# Patient Record
Sex: MALE | Race: WHITE | Employment: OTHER | URBAN - METROPOLITAN AREA
[De-identification: names, ages, dates, MRNs, and addresses within clinical notes are randomized per-mention and may not be internally consistent; named-entity substitution may affect disease eponyms.]

---

## 2022-09-16 ENCOUNTER — APPOINTMENT (OUTPATIENT)
Dept: VASCULAR SURGERY | Age: 62
DRG: 492 | End: 2022-09-16
Attending: HOSPITALIST
Payer: MEDICARE

## 2022-09-16 ENCOUNTER — APPOINTMENT (OUTPATIENT)
Dept: CT IMAGING | Age: 62
DRG: 492 | End: 2022-09-16
Attending: EMERGENCY MEDICINE
Payer: MEDICARE

## 2022-09-16 ENCOUNTER — APPOINTMENT (OUTPATIENT)
Dept: GENERAL RADIOLOGY | Age: 62
DRG: 492 | End: 2022-09-16
Attending: PHYSICIAN ASSISTANT
Payer: MEDICARE

## 2022-09-16 ENCOUNTER — APPOINTMENT (OUTPATIENT)
Dept: GENERAL RADIOLOGY | Age: 62
DRG: 492 | End: 2022-09-16
Attending: HOSPITALIST
Payer: MEDICARE

## 2022-09-16 ENCOUNTER — HOSPITAL ENCOUNTER (INPATIENT)
Age: 62
LOS: 16 days | Discharge: NURSING, REHAB OR LONG-TERM CARE | DRG: 492 | End: 2022-10-03
Attending: EMERGENCY MEDICINE | Admitting: INTERNAL MEDICINE
Payer: MEDICARE

## 2022-09-16 ENCOUNTER — APPOINTMENT (OUTPATIENT)
Dept: GENERAL RADIOLOGY | Age: 62
DRG: 492 | End: 2022-09-16
Attending: EMERGENCY MEDICINE
Payer: MEDICARE
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
## 2022-09-16 DIAGNOSIS — S82.842A CLOSED BIMALLEOLAR FRACTURE OF LEFT ANKLE, INITIAL ENCOUNTER: ICD-10-CM

## 2022-09-16 DIAGNOSIS — I48.0 PAF (PAROXYSMAL ATRIAL FIBRILLATION) (HCC): ICD-10-CM

## 2022-09-16 DIAGNOSIS — Z99.2 ESRD ON HEMODIALYSIS (HCC): ICD-10-CM

## 2022-09-16 DIAGNOSIS — R55 NEAR SYNCOPE: Primary | ICD-10-CM

## 2022-09-16 DIAGNOSIS — S82.892A CLOSED FRACTURE OF LEFT ANKLE, INITIAL ENCOUNTER: ICD-10-CM

## 2022-09-16 DIAGNOSIS — S93.05XA ANKLE DISLOCATION, LEFT, INITIAL ENCOUNTER: ICD-10-CM

## 2022-09-16 DIAGNOSIS — N18.6 ESRD ON HEMODIALYSIS (HCC): ICD-10-CM

## 2022-09-16 DIAGNOSIS — S09.8XXA BLUNT HEAD TRAUMA, INITIAL ENCOUNTER: ICD-10-CM

## 2022-09-16 LAB
ALBUMIN SERPL-MCNC: 3.4 G/DL (ref 3.5–5)
ALBUMIN/GLOB SERPL: 0.8 {RATIO} (ref 1.1–2.2)
ALP SERPL-CCNC: 122 U/L (ref 45–117)
ALT SERPL-CCNC: 17 U/L (ref 12–78)
AMORPH CRY URNS QL MICRO: ABNORMAL
ANION GAP SERPL CALC-SCNC: 8 MMOL/L (ref 5–15)
APPEARANCE UR: CLEAR
AST SERPL-CCNC: 14 U/L (ref 15–37)
ATRIAL RATE: 73 BPM
BACTERIA URNS QL MICRO: NEGATIVE /HPF
BASOPHILS # BLD: 0 K/UL (ref 0–0.1)
BASOPHILS NFR BLD: 0 % (ref 0–1)
BILIRUB SERPL-MCNC: 0.3 MG/DL (ref 0.2–1)
BILIRUB UR QL: NEGATIVE
BUN SERPL-MCNC: 60 MG/DL (ref 6–20)
BUN/CREAT SERPL: 11 (ref 12–20)
CALCIUM SERPL-MCNC: 9.9 MG/DL (ref 8.5–10.1)
CALCULATED P AXIS, ECG09: 17 DEGREES
CALCULATED R AXIS, ECG10: -82 DEGREES
CALCULATED T AXIS, ECG11: 81 DEGREES
CHLORIDE SERPL-SCNC: 97 MMOL/L (ref 97–108)
CO2 SERPL-SCNC: 27 MMOL/L (ref 21–32)
COLOR UR: ABNORMAL
COMMENT, HOLDF: NORMAL
CREAT SERPL-MCNC: 5.29 MG/DL (ref 0.7–1.3)
DIAGNOSIS, 93000: NORMAL
DIFFERENTIAL METHOD BLD: ABNORMAL
EOSINOPHIL # BLD: 0 K/UL (ref 0–0.4)
EOSINOPHIL NFR BLD: 0 % (ref 0–7)
EPITH CASTS URNS QL MICRO: ABNORMAL /LPF
ERYTHROCYTE [DISTWIDTH] IN BLOOD BY AUTOMATED COUNT: 12.9 % (ref 11.5–14.5)
GLOBULIN SER CALC-MCNC: 4.2 G/DL (ref 2–4)
GLUCOSE SERPL-MCNC: 86 MG/DL (ref 65–100)
GLUCOSE UR STRIP.AUTO-MCNC: 250 MG/DL
HCT VFR BLD AUTO: 36.5 % (ref 36.6–50.3)
HGB BLD-MCNC: 12.9 G/DL (ref 12.1–17)
HGB UR QL STRIP: ABNORMAL
IMM GRANULOCYTES # BLD AUTO: 0.1 K/UL (ref 0–0.04)
IMM GRANULOCYTES NFR BLD AUTO: 0 % (ref 0–0.5)
KETONES UR QL STRIP.AUTO: NEGATIVE MG/DL
LEUKOCYTE ESTERASE UR QL STRIP.AUTO: NEGATIVE
LYMPHOCYTES # BLD: 1.7 K/UL (ref 0.8–3.5)
LYMPHOCYTES NFR BLD: 15 % (ref 12–49)
MCH RBC QN AUTO: 31.2 PG (ref 26–34)
MCHC RBC AUTO-ENTMCNC: 35.3 G/DL (ref 30–36.5)
MCV RBC AUTO: 88.4 FL (ref 80–99)
MONOCYTES # BLD: 0.7 K/UL (ref 0–1)
MONOCYTES NFR BLD: 6 % (ref 5–13)
NEUTS SEG # BLD: 8.8 K/UL (ref 1.8–8)
NEUTS SEG NFR BLD: 79 % (ref 32–75)
NITRITE UR QL STRIP.AUTO: NEGATIVE
NRBC # BLD: 0 K/UL (ref 0–0.01)
NRBC BLD-RTO: 0 PER 100 WBC
P-R INTERVAL, ECG05: 218 MS
PH UR STRIP: 5.5 [PH] (ref 5–8)
PLATELET # BLD AUTO: 181 K/UL (ref 150–400)
PMV BLD AUTO: 10.2 FL (ref 8.9–12.9)
POTASSIUM SERPL-SCNC: 4.1 MMOL/L (ref 3.5–5.1)
PROT SERPL-MCNC: 7.6 G/DL (ref 6.4–8.2)
PROT UR STRIP-MCNC: >300 MG/DL
Q-T INTERVAL, ECG07: 446 MS
QRS DURATION, ECG06: 162 MS
QTC CALCULATION (BEZET), ECG08: 491 MS
RBC # BLD AUTO: 4.13 M/UL (ref 4.1–5.7)
RBC #/AREA URNS HPF: ABNORMAL /HPF (ref 0–5)
SAMPLES BEING HELD,HOLD: NORMAL
SODIUM SERPL-SCNC: 132 MMOL/L (ref 136–145)
SP GR UR REFRACTOMETRY: 1.02 (ref 1–1.03)
TROPONIN-HIGH SENSITIVITY: 54 NG/L (ref 0–76)
UR CULT HOLD, URHOLD: NORMAL
UROBILINOGEN UR QL STRIP.AUTO: 0.2 EU/DL (ref 0.2–1)
VENTRICULAR RATE, ECG03: 73 BPM
WBC # BLD AUTO: 11.3 K/UL (ref 4.1–11.1)
WBC URNS QL MICRO: ABNORMAL /HPF (ref 0–4)

## 2022-09-16 PROCEDURE — 87340 HEPATITIS B SURFACE AG IA: CPT

## 2022-09-16 PROCEDURE — 90935 HEMODIALYSIS ONE EVALUATION: CPT

## 2022-09-16 PROCEDURE — 74011250636 HC RX REV CODE- 250/636: Performed by: HOSPITALIST

## 2022-09-16 PROCEDURE — 5A1D70Z PERFORMANCE OF URINARY FILTRATION, INTERMITTENT, LESS THAN 6 HOURS PER DAY: ICD-10-PCS | Performed by: INTERNAL MEDICINE

## 2022-09-16 PROCEDURE — 85025 COMPLETE CBC W/AUTO DIFF WBC: CPT

## 2022-09-16 PROCEDURE — G0378 HOSPITAL OBSERVATION PER HR: HCPCS

## 2022-09-16 PROCEDURE — 96376 TX/PRO/DX INJ SAME DRUG ADON: CPT

## 2022-09-16 PROCEDURE — 96375 TX/PRO/DX INJ NEW DRUG ADDON: CPT

## 2022-09-16 PROCEDURE — 81001 URINALYSIS AUTO W/SCOPE: CPT

## 2022-09-16 PROCEDURE — 51798 US URINE CAPACITY MEASURE: CPT

## 2022-09-16 PROCEDURE — 73600 X-RAY EXAM OF ANKLE: CPT

## 2022-09-16 PROCEDURE — 0QSKXZZ REPOSITION LEFT FIBULA, EXTERNAL APPROACH: ICD-10-PCS | Performed by: PHYSICIAN ASSISTANT

## 2022-09-16 PROCEDURE — 93880 EXTRACRANIAL BILAT STUDY: CPT

## 2022-09-16 PROCEDURE — 74011250636 HC RX REV CODE- 250/636: Performed by: EMERGENCY MEDICINE

## 2022-09-16 PROCEDURE — 84484 ASSAY OF TROPONIN QUANT: CPT

## 2022-09-16 PROCEDURE — 80053 COMPREHEN METABOLIC PANEL: CPT

## 2022-09-16 PROCEDURE — 96374 THER/PROPH/DIAG INJ IV PUSH: CPT

## 2022-09-16 PROCEDURE — 36415 COLL VENOUS BLD VENIPUNCTURE: CPT

## 2022-09-16 PROCEDURE — 74011250637 HC RX REV CODE- 250/637: Performed by: HOSPITALIST

## 2022-09-16 PROCEDURE — 70450 CT HEAD/BRAIN W/O DYE: CPT

## 2022-09-16 PROCEDURE — 86706 HEP B SURFACE ANTIBODY: CPT

## 2022-09-16 PROCEDURE — 74011250637 HC RX REV CODE- 250/637: Performed by: NURSE PRACTITIONER

## 2022-09-16 PROCEDURE — 71045 X-RAY EXAM CHEST 1 VIEW: CPT

## 2022-09-16 PROCEDURE — 93005 ELECTROCARDIOGRAM TRACING: CPT

## 2022-09-16 PROCEDURE — 75810000301 HC ER LEVEL 1 CLOSED TREATMNT FRACTURE/DISLOCATION

## 2022-09-16 PROCEDURE — 74011000250 HC RX REV CODE- 250: Performed by: EMERGENCY MEDICINE

## 2022-09-16 PROCEDURE — 99285 EMERGENCY DEPT VISIT HI MDM: CPT

## 2022-09-16 RX ORDER — SODIUM CHLORIDE 0.9 % (FLUSH) 0.9 %
5-40 SYRINGE (ML) INJECTION AS NEEDED
Status: DISCONTINUED | OUTPATIENT
Start: 2022-09-16 | End: 2022-10-03 | Stop reason: HOSPADM

## 2022-09-16 RX ORDER — ACETAMINOPHEN 650 MG/1
650 SUPPOSITORY RECTAL
Status: DISCONTINUED | OUTPATIENT
Start: 2022-09-16 | End: 2022-09-16

## 2022-09-16 RX ORDER — ONDANSETRON 2 MG/ML
4 INJECTION INTRAMUSCULAR; INTRAVENOUS
Status: DISCONTINUED | OUTPATIENT
Start: 2022-09-16 | End: 2022-10-03 | Stop reason: HOSPADM

## 2022-09-16 RX ORDER — LIDOCAINE HYDROCHLORIDE 10 MG/ML
5 INJECTION INFILTRATION; PERINEURAL ONCE
Status: COMPLETED | OUTPATIENT
Start: 2022-09-16 | End: 2022-09-16

## 2022-09-16 RX ORDER — MORPHINE SULFATE 2 MG/ML
2 INJECTION, SOLUTION INTRAMUSCULAR; INTRAVENOUS
Status: COMPLETED | OUTPATIENT
Start: 2022-09-16 | End: 2022-09-16

## 2022-09-16 RX ORDER — ALBUMIN HUMAN 250 G/1000ML
25 SOLUTION INTRAVENOUS
Status: DISCONTINUED | OUTPATIENT
Start: 2022-09-16 | End: 2022-10-03 | Stop reason: HOSPADM

## 2022-09-16 RX ORDER — ACETAMINOPHEN 650 MG/1
650 SUPPOSITORY RECTAL
Status: DISCONTINUED | OUTPATIENT
Start: 2022-09-16 | End: 2022-10-03 | Stop reason: HOSPADM

## 2022-09-16 RX ORDER — HEPARIN SODIUM 5000 [USP'U]/ML
5000 INJECTION, SOLUTION INTRAVENOUS; SUBCUTANEOUS EVERY 8 HOURS
Status: DISCONTINUED | OUTPATIENT
Start: 2022-09-17 | End: 2022-09-21

## 2022-09-16 RX ORDER — ENOXAPARIN SODIUM 100 MG/ML
40 INJECTION SUBCUTANEOUS DAILY
Status: DISCONTINUED | OUTPATIENT
Start: 2022-09-17 | End: 2022-09-16 | Stop reason: ALTCHOICE

## 2022-09-16 RX ORDER — ONDANSETRON 4 MG/1
4 TABLET, ORALLY DISINTEGRATING ORAL
Status: DISCONTINUED | OUTPATIENT
Start: 2022-09-16 | End: 2022-10-03 | Stop reason: HOSPADM

## 2022-09-16 RX ORDER — ACETAMINOPHEN 325 MG/1
650 TABLET ORAL
Status: DISCONTINUED | OUTPATIENT
Start: 2022-09-16 | End: 2022-09-16

## 2022-09-16 RX ORDER — POLYETHYLENE GLYCOL 3350 17 G/17G
17 POWDER, FOR SOLUTION ORAL DAILY PRN
Status: DISCONTINUED | OUTPATIENT
Start: 2022-09-16 | End: 2022-09-24

## 2022-09-16 RX ORDER — OXYCODONE HYDROCHLORIDE 5 MG/1
5 TABLET ORAL
Status: DISCONTINUED | OUTPATIENT
Start: 2022-09-16 | End: 2022-09-17

## 2022-09-16 RX ORDER — ACETAMINOPHEN 325 MG/1
650 TABLET ORAL
Status: DISCONTINUED | OUTPATIENT
Start: 2022-09-16 | End: 2022-10-03 | Stop reason: HOSPADM

## 2022-09-16 RX ORDER — FENTANYL CITRATE 50 UG/ML
25 INJECTION, SOLUTION INTRAMUSCULAR; INTRAVENOUS
Status: DISCONTINUED | OUTPATIENT
Start: 2022-09-16 | End: 2022-09-21 | Stop reason: SDUPTHER

## 2022-09-16 RX ORDER — MORPHINE SULFATE 2 MG/ML
2 INJECTION, SOLUTION INTRAMUSCULAR; INTRAVENOUS
Status: DISCONTINUED | OUTPATIENT
Start: 2022-09-16 | End: 2022-09-16

## 2022-09-16 RX ORDER — SODIUM CHLORIDE 0.9 % (FLUSH) 0.9 %
5-40 SYRINGE (ML) INJECTION EVERY 8 HOURS
Status: DISCONTINUED | OUTPATIENT
Start: 2022-09-16 | End: 2022-10-03 | Stop reason: HOSPADM

## 2022-09-16 RX ORDER — HYDRALAZINE HYDROCHLORIDE 20 MG/ML
20 INJECTION INTRAMUSCULAR; INTRAVENOUS
Status: DISCONTINUED | OUTPATIENT
Start: 2022-09-16 | End: 2022-09-17

## 2022-09-16 RX ORDER — MORPHINE SULFATE 4 MG/ML
4 INJECTION INTRAVENOUS ONCE
Status: COMPLETED | OUTPATIENT
Start: 2022-09-16 | End: 2022-09-16

## 2022-09-16 RX ADMIN — WATER 2 G: 1 INJECTION INTRAMUSCULAR; INTRAVENOUS; SUBCUTANEOUS at 10:04

## 2022-09-16 RX ADMIN — ACETAMINOPHEN 650 MG: 325 TABLET, FILM COATED ORAL at 20:45

## 2022-09-16 RX ADMIN — MORPHINE SULFATE 4 MG: 4 INJECTION INTRAVENOUS at 13:30

## 2022-09-16 RX ADMIN — HYDRALAZINE HYDROCHLORIDE 20 MG: 20 INJECTION, SOLUTION INTRAMUSCULAR; INTRAVENOUS at 17:52

## 2022-09-16 RX ADMIN — MORPHINE SULFATE 2 MG: 2 INJECTION, SOLUTION INTRAMUSCULAR; INTRAVENOUS at 18:59

## 2022-09-16 RX ADMIN — MORPHINE SULFATE 2 MG: 2 INJECTION, SOLUTION INTRAMUSCULAR; INTRAVENOUS at 15:41

## 2022-09-16 RX ADMIN — OXYCODONE 5 MG: 5 TABLET ORAL at 16:33

## 2022-09-16 RX ADMIN — MORPHINE SULFATE 2 MG: 2 INJECTION, SOLUTION INTRAMUSCULAR; INTRAVENOUS at 10:03

## 2022-09-16 RX ADMIN — ACETAMINOPHEN 650 MG: 325 TABLET, FILM COATED ORAL at 15:41

## 2022-09-16 RX ADMIN — MORPHINE SULFATE 2 MG: 2 INJECTION, SOLUTION INTRAMUSCULAR; INTRAVENOUS at 10:45

## 2022-09-16 RX ADMIN — OXYCODONE 5 MG: 5 TABLET ORAL at 20:45

## 2022-09-16 RX ADMIN — LIDOCAINE HYDROCHLORIDE 5 ML: 10 INJECTION, SOLUTION INFILTRATION; PERINEURAL at 10:44

## 2022-09-16 NOTE — ED NOTES
TRANSFER - OUT REPORT:    Verbal report given to Reshma(name) on Jasson Moreno  being transferred to [facility](unit) for routine progression of care       Report consisted of patients Situation, Background, Assessment and   Recommendations(SBAR). Information from the following report(s) SBAR, Kardex, ED Summary and MAR was reviewed with the receiving nurse. Lines:   Peripheral IV 09/16/22 Right Arm (Active)   Site Assessment Clean, dry, & intact 09/16/22 0956        Opportunity for questions and clarification was provided.       Patient transported with:

## 2022-09-16 NOTE — PROCEDURES
Fracture Reduction Procedural Note      Preprocedural Diagnosis: Left ankle dislocation, left distal fibula fracture  Postprocedural Diagnosis: Left ankle dislocation, left distal fibula fracture    Provider: ANJANA Shah     Assistant: none    Anesthesia: Ankle joint block    Allergy:   Allergies   Allergen Reactions    Adhesive Rash     Paper tape ok    Bactrim [Sulfamethoprim] Rash       Procedure Details: The risks,benefits and alternatives of a fracture reduction were explained and consent was obtained for the procedure. Patient at high risk for procedural sedation. I elected to perform an ankle joint block at the bedside. The anterior medial aspect of the left ankle was cleansed with betadine at the site of proposed joint entry. A small skin tear over the medial ankle was also cleansed with betadine  The ankle joint was then entered with a 21 gauge 1.5\" needle and a hemarthrosis was encountered. The joint was then injected with 10mL of 1% lidocaine. After allowing time and confirming efficacy of the block, closed closed reduction was performed. The mechanism of fracture was reproduced and axial traction was applied with nursing providing countertraction. The ankle was further manipulated until reduction was achieved. A xeroform dressing was applied to the medial ankle skin tear. Cast padding was then applied to the left leg from the knee to the toes while maintaining reduction. Posterior and stirrup splints were applied of and secured in place with ACE wraps. Pt. Tolerated procedure well w/o complications. Patient educated on neurovascular compromise and compartment syndrome. Pt. Neurovascularly intact with sensation intact and capillary refill <2secs p procedure in left foot. Complications:  None; patient tolerated the procedure well.         Signed By: Justyna Brandt, [address] [employer]

## 2022-09-16 NOTE — PROGRESS NOTES
Problem: Falls - Risk of  Goal: *Absence of Falls  Description: Document Yina Rabago Fall Risk and appropriate interventions in the flowsheet.   Outcome: Progressing Towards Goal  Note: Fall Risk Interventions:            Medication Interventions: Bed/chair exit alarm, Patient to call before getting OOB    Elimination Interventions: Bed/chair exit alarm    History of Falls Interventions: Room close to nurse's station, Investigate reason for fall, Bed/chair exit alarm         Problem: Pain  Goal: *Control of Pain  Outcome: Progressing Towards Goal  Goal: *PALLIATIVE CARE:  Alleviation of Pain  Outcome: Progressing Towards Goal

## 2022-09-16 NOTE — ED TRIAGE NOTES
Triage: Pt arrives via EMS with CC of markedly deformed ankle and opening to the skin following a  fall today. He reports his legs just gave out from underneath him. He hit his head on the floor. He reports he gets heparin for dialysis but otherwise not anticoagulated. The skin is open at the site of deformity. He is due for dialysis today.

## 2022-09-16 NOTE — H&P
History & Physical    Primary Care Provider: Danielle Musa MD  Source of Information: Patient chart    Please note that this dictation was completed with [employer], the computer voice recognition software. Quite often unanticipated grammatical, syntax, homophones, and other interpretive errors are inadvertently transcribed by the computer software. Please disregard these errors. Please excuse any errors that have escaped final proofreading. Chief complaint fall    History of Presenting Illness:   Rosalia Robert is a 58 y.o. male who presents with fall and ankle fracture. Patient has past medical history of end-stage renal disease on hemodialysis. He said he struck his head he complains of left ankle injury. There was an injury over the medial malleolus . Right now patient complains of severe ankle pain was sedated for left ankle stabilization. Denies any chest pain, shortness of breath, fevers or recent illnesses. No headache. He does receive heparin for dialysis. He was due for dialysis today and typically receives it Monday Wednesday and Friday. Last dialysis was Wednesday. He sees Dr. Clau Arvizu. Nephrology consulted for resuming hemodialysis    The patient denies any fever, chills, chest pain, cough, congestion, recent illness, palpitations, or dysuria. Review of Systems:  Pertinent items are noted in the History of Present Illness. Past Medical History:   Diagnosis Date    Anemia     Chronic kidney disease     Diabetes ([address]     Dorsalgia     ESRD on hemodialysis ([facility] [address]     Gastro-esophageal reflux     Heart failure (HCC)     Hypertension     Obesity     Polyneuropathy     Sciatica       No past surgical history on file. Prior to Admission medications    Not on File     Allergies   Allergen Reactions    Adhesive Rash     Paper tape ok    Bactrim [Sulfamethoprim] Rash      No family history on file.      SOCIAL HISTORY:  Patient resides:  Independently x   Assisted Living    SNF    With family care       Smoking history:   None x   Former    Chronic      Alcohol history:   None x   Social    Chronic      Ambulates:   Independently x   w/cane    w/walker    w/wc    CODE STATUS:  DNR    Full x   Other      Objective:     Physical Exam:     Visit Vitals  BP (!) 162/66   Pulse 62   Temp 98.4 °F (36.9 °C)   Resp 12   SpO2 92%      O2 Device: None (Room air)    General:  Alert, cooperative, no distress, appears stated age. Head:  Normocephalic, without obvious abnormality, atraumatic. Eyes:  Conjunctivae/corneas clear. PERRL, EOMs intact. Nose: Nares normal. Septum midline. Mucosa normal. No drainage or sinus tenderness. Throat: Lips, mucosa, and tongue normal. Teeth and gums normal.   Neck: Supple, symmetrical, trachea midline, no carotid bruit and no JVD. Lungs:   Clear to auscultation bilaterally. Heart:  Regular rate and rhythm, S1, S2 normal, no murmur, click, rub or gallop. Abdomen:   Soft, non-tender. Bowel sounds normal. No masses,  No organomegaly. Extremities: Left ankle is on the pillow wrapped in bandage   Pulses: 2+ and symmetric all extremities. Skin: Skin color, texture, turgor normal. No rashes or lesions   Neurologic: Sedated possibly sedation given during procedure         EKG:  normal EKG, normal sinus rhythm. Data Review:     Recent Days:  Recent Labs     09/16/22  0952   WBC 11.3*   HGB 12.9   HCT 36.5*        Recent Labs     09/16/22  0952   *   K 4.1   CL 97   CO2 27   GLU 86   BUN 60*   CREA 5.29*   CA 9.9   ALB 3.4*   TBILI 0.3   ALT 17     No results for input(s): PH, PCO2, PO2, HCO3, FIO2 in the last 72 hours.     24 Hour Results:  Recent Results (from the past 24 hour(s))   CBC WITH AUTOMATED DIFF    Collection Time: 09/16/22  9:52 AM   Result Value Ref Range    WBC 11.3 (H) 4.1 - 11.1 K/uL    RBC 4.13 4.10 - 5.70 M/uL    HGB 12.9 12.1 - 17.0 g/dL    HCT 36.5 (L) 36.6 - 50.3 %    MCV 88.4 80.0 - 99.0 FL    MCH 31.2 26.0 - 34.0 PG    MCHC 35.3 30.0 - 36.5 g/dL    RDW 12.9 11.5 - 14.5 %    PLATELET 286 090 - 340 K/uL    MPV 10.2 8.9 - 12.9 FL    NRBC 0.0 0  WBC    ABSOLUTE NRBC 0.00 0.00 - 0.01 K/uL    NEUTROPHILS 79 (H) 32 - 75 %    LYMPHOCYTES 15 12 - 49 %    MONOCYTES 6 5 - 13 %    EOSINOPHILS 0 0 - 7 %    BASOPHILS 0 0 - 1 %    IMMATURE GRANULOCYTES 0 0.0 - 0.5 %    ABS. NEUTROPHILS 8.8 (H) 1.8 - 8.0 K/UL    ABS. LYMPHOCYTES 1.7 0.8 - 3.5 K/UL    ABS. MONOCYTES 0.7 0.0 - 1.0 K/UL    ABS. EOSINOPHILS 0.0 0.0 - 0.4 K/UL    ABS. BASOPHILS 0.0 0.0 - 0.1 K/UL    ABS. IMM. GRANS. 0.1 (H) 0.00 - 0.04 K/UL    DF AUTOMATED     METABOLIC PANEL, COMPREHENSIVE    Collection Time: 09/16/22  9:52 AM   Result Value Ref Range    Sodium 132 (L) 136 - 145 mmol/L    Potassium 4.1 3.5 - 5.1 mmol/L    Chloride 97 97 - 108 mmol/L    CO2 27 21 - 32 mmol/L    Anion gap 8 5 - 15 mmol/L    Glucose 86 65 - 100 mg/dL    BUN 60 (H) 6 - 20 MG/DL    Creatinine 5.29 (H) 0.70 - 1.30 MG/DL    BUN/Creatinine ratio 11 (L) 12 - 20      GFR est AA 13 (L) >60 ml/min/1.73m2    GFR est non-AA 11 (L) >60 ml/min/1.73m2    Calcium 9.9 8.5 - 10.1 MG/DL    Bilirubin, total 0.3 0.2 - 1.0 MG/DL    ALT (SGPT) 17 12 - 78 U/L    AST (SGOT) 14 (L) 15 - 37 U/L    Alk. phosphatase 122 (H) 45 - 117 U/L    Protein, total 7.6 6.4 - 8.2 g/dL    Albumin 3.4 (L) 3.5 - 5.0 g/dL    Globulin 4.2 (H) 2.0 - 4.0 g/dL    A-G Ratio 0.8 (L) 1.1 - 2.2     TROPONIN-HIGH SENSITIVITY    Collection Time: 09/16/22  9:52 AM   Result Value Ref Range    Troponin-High Sensitivity 54 0 - 76 ng/L   SAMPLES BEING HELD    Collection Time: 09/16/22  9:53 AM   Result Value Ref Range    SAMPLES BEING HELD 1blue 1red     COMMENT        Add-on orders for these samples will be processed based on acceptable specimen integrity and analyte stability, which may vary by analyte. Imaging:     XR ANKLE LT AP/LAT    Result Date: 9/16/2022  Improved alignment.     XR ANKLE LT AP/LAT    Result Date: 9/16/2022  Fracture dislocation as described above. CT HEAD WO CONT    Result Date: 9/16/2022  No acute abnormality. XR CHEST PORT    Result Date: 9/16/2022  No acute process. Admit to inpatient status      Patient was explained about the risk of admission including and not a complete list including risk of falls,fractures,blood clots,allergic reactions,infections. Patient/family also understands and agrees to the treatment plan including medications and side effect profiles and also understand the risk with radiation while undergoing imaging studies. The patient and the family/friends (after permission given by the patient to discuss) understand this and agree with the admission plan.         Assessment:     Active Problems:    Syncope (9/16/2022)           Plan:     Ankle fracture  --Management per Ortho pelvic surgery  --Pain control  --Elucidated plan for ORIF later    End-stage renal disease on hemodialysis Monday Wednesday Friday  --Insulted nephrology    Near syncope leading to fall and blunt head trauma  --CT head no acute abnormality  --Get echocardiogram carotid Doppler    Full code ambulates at baseline currently having ankle fracture possibly will need support  DVT prophylaxis Lovenox  Dispo plan to be determined       Signed By: Steve Young MD     September 16, 2022

## 2022-09-16 NOTE — ED PROVIDER NOTES
HPI     Please note that this dictation was completed with [employer], the computer voice recognition software. Quite often unanticipated grammatical, syntax, homophones, and other interpretive errors are inadvertently transcribed by the computer software. Please disregard these errors. Please excuse any errors that have escaped final proofreading. 49-year-old male with a history of multiple medical problems including cardiac history, end-stage renal disease on hemodialysis who felt lightheaded and fell. He struck his head. He complains of left ankle injury. There is an abrasion and wound over his left medial malleolus. Complains of severe left ankle pain worse with movement. Denies any loss of consciousness. Denies any chest pain, shortness of breath, fevers or recent illnesses. No headache. He does receive heparin for dialysis. He was due for dialysis today and typically receives it Monday Wednesday and Friday. Last dialysis was Wednesday. He sees Dr. Phu Maldonado. Social history:  nonsmoker. No drug use. No past medical history on file. No past surgical history on file. No family history on file.     Social History     Socioeconomic History    Marital status: Not on file     Spouse name: Not on file    Number of children: Not on file    Years of education: Not on file    Highest education level: Not on file   Occupational History    Not on file   Tobacco Use    Smoking status: Not on file    Smokeless tobacco: Not on file   Substance and Sexual Activity    Alcohol use: Not on file    Drug use: Not on file    Sexual activity: Not on file   Other Topics Concern    Not on file   Social History Narrative    Not on file     Social Determinants of Health     Financial Resource Strain: Not on file   Food Insecurity: Not on file   Transportation Needs: Not on file   Physical Activity: Not on file   Stress: Not on file   Social Connections: Not on file   Intimate Partner Violence: Not on file Housing Stability: Not on file         ALLERGIES: Adhesive and Bactrim [sulfamethoprim]    Review of Systems   Constitutional:  Negative for chills and fever. HENT:  Negative for congestion. Respiratory:  Negative for chest tightness and shortness of breath. Cardiovascular:  Negative for chest pain. Gastrointestinal:  Negative for nausea and vomiting. Musculoskeletal:  Positive for arthralgias, gait problem and joint swelling. Skin:  Positive for wound. Neurological:  Positive for light-headedness. All other systems reviewed and are negative. Vitals:    09/16/22 0937   BP: (!) 207/71   Pulse: 71   Resp: 18   Temp: 98.4 °F (36.9 °C)   SpO2: 97%            Physical Exam  Vitals and nursing note reviewed. Constitutional:       Appearance: Normal appearance. He is well-developed. He is obese. HENT:      Head: Normocephalic and atraumatic. Nose: No congestion or rhinorrhea. Mouth/Throat:      Mouth: Mucous membranes are moist.      Pharynx: No oropharyngeal exudate or posterior oropharyngeal erythema. Eyes:      General: No scleral icterus. Right eye: No discharge. Left eye: No discharge. Conjunctiva/sclera: Conjunctivae normal.   Cardiovascular:      Rate and Rhythm: Normal rate and regular rhythm. Heart sounds: Normal heart sounds. No murmur heard. No friction rub. No gallop. Pulmonary:      Effort: Pulmonary effort is normal. No respiratory distress. Breath sounds: Normal breath sounds. No wheezing or rales. Abdominal:      General: There is no distension. Palpations: Abdomen is soft. Tenderness: There is no abdominal tenderness. There is no guarding. Musculoskeletal:      Cervical back: Normal range of motion and neck supple. No rigidity. Comments: Left ankle with distal intact sensation, 2+ DP pulse. Limited range of motion of the left ankle due to pain.   Positive edema and tenderness to his medial malleolus with overlying abrasion to his medial malleolus. Lymphadenopathy:      Cervical: No cervical adenopathy. Skin:     General: Skin is warm and dry. Coloration: Skin is not jaundiced or pale. Findings: No rash. Neurological:      Mental Status: He is alert and oriented to person, place, and time. Sensory: No sensory deficit. Motor: No weakness. MDM       51-year-old male here with left ankle injury and near syncope. Significant cardiac history and end-stage renal disease. Differential diagnosis includes MI, dysrhythmia, electrolyte abnormality, open fracture and others. Will cover with abx pending ortho consult. Check left ankle x-ray, chest x-ray, labs, EKG. Procedures      12:02 PM  Fracture reduced by orthopedics. Splinted by orthopedics. Will admit to hospitalist for the near syncope. Head CT negative. Nephrology consulted and saw patient. They are arranging for dialysis. Patient is being admitted to the hospital.  The results of their tests and reasons for their admission have been discussed with them and/or available family. They convey agreement and understanding for the need to be admitted and for their admission diagnosis. Consultation will be made now with the inpatient physician for hospitalization. Perfect [facility] Consult for Admission  12:03 PM    ED Room Number: VK16/90  Patient Name and age:  Madeleine Zapata 58 y.o.  male  Working Diagnosis:   1. Near syncope    2. ESRD on hemodialysis (HCC)    3. Blunt head trauma, initial encounter    4. Fall from ground level    5. Ankle dislocation, left, initial encounter    6. Closed fracture of left ankle, initial encounter        COVID-19 Suspicion:  no  Sepsis present:  no  Reassessment needed: no  Code Status:  Full Code  Readmission: no  Isolation Requirements:  no  Recommended Level of Care:  telemetry  Department:[facility] Adult ED - 21   Other:  orthopedics reduced the ankle.   Nephrology consulted and will dialyze today. ED EKG interpretation:  Rhythm: SR with first degree AVB and RBBB and pvc; and regular . Rate (approx.): 73; Axis: left axis deviation; P wave: prolonged: QRS interval: prolonged; ST/T wave: normal;  This EKG was interpreted by Stu Perez MD,ED Provider. Recent Results (from the past 24 hour(s))   CBC WITH AUTOMATED DIFF    Collection Time: 09/16/22  9:52 AM   Result Value Ref Range    WBC 11.3 (H) 4.1 - 11.1 K/uL    RBC 4.13 4.10 - 5.70 M/uL    HGB 12.9 12.1 - 17.0 g/dL    HCT 36.5 (L) 36.6 - 50.3 %    MCV 88.4 80.0 - 99.0 FL    MCH 31.2 26.0 - 34.0 PG    MCHC 35.3 30.0 - 36.5 g/dL    RDW 12.9 11.5 - 14.5 %    PLATELET 131 845 - 153 K/uL    MPV 10.2 8.9 - 12.9 FL    NRBC 0.0 0  WBC    ABSOLUTE NRBC 0.00 0.00 - 0.01 K/uL    NEUTROPHILS 79 (H) 32 - 75 %    LYMPHOCYTES 15 12 - 49 %    MONOCYTES 6 5 - 13 %    EOSINOPHILS 0 0 - 7 %    BASOPHILS 0 0 - 1 %    IMMATURE GRANULOCYTES 0 0.0 - 0.5 %    ABS. NEUTROPHILS 8.8 (H) 1.8 - 8.0 K/UL    ABS. LYMPHOCYTES 1.7 0.8 - 3.5 K/UL    ABS. MONOCYTES 0.7 0.0 - 1.0 K/UL    ABS. EOSINOPHILS 0.0 0.0 - 0.4 K/UL    ABS. BASOPHILS 0.0 0.0 - 0.1 K/UL    ABS. IMM. GRANS. 0.1 (H) 0.00 - 0.04 K/UL    DF AUTOMATED     METABOLIC PANEL, COMPREHENSIVE    Collection Time: 09/16/22  9:52 AM   Result Value Ref Range    Sodium 132 (L) 136 - 145 mmol/L    Potassium 4.1 3.5 - 5.1 mmol/L    Chloride 97 97 - 108 mmol/L    CO2 27 21 - 32 mmol/L    Anion gap 8 5 - 15 mmol/L    Glucose 86 65 - 100 mg/dL    BUN 60 (H) 6 - 20 MG/DL    Creatinine 5.29 (H) 0.70 - 1.30 MG/DL    BUN/Creatinine ratio 11 (L) 12 - 20      GFR est AA 13 (L) >60 ml/min/1.73m2    GFR est non-AA 11 (L) >60 ml/min/1.73m2    Calcium 9.9 8.5 - 10.1 MG/DL    Bilirubin, total 0.3 0.2 - 1.0 MG/DL    ALT (SGPT) 17 12 - 78 U/L    AST (SGOT) 14 (L) 15 - 37 U/L    Alk.  phosphatase 122 (H) 45 - 117 U/L    Protein, total 7.6 6.4 - 8.2 g/dL    Albumin 3.4 (L) 3.5 - 5.0 g/dL    Globulin 4.2 (H) 2.0 - 4.0 g/dL    A-G Ratio 0.8 (L) 1.1 - 2.2     TROPONIN-HIGH SENSITIVITY    Collection Time: 09/16/22  9:52 AM   Result Value Ref Range    Troponin-High Sensitivity 54 0 - 76 ng/L   SAMPLES BEING HELD    Collection Time: 09/16/22  9:53 AM   Result Value Ref Range    SAMPLES BEING HELD 1blue 1red     COMMENT        Add-on orders for these samples will be processed based on acceptable specimen integrity and analyte stability, which may vary by analyte. XR ANKLE LT AP/LAT    Result Date: 9/16/2022  EXAM: XR ANKLE LT AP/LAT INDICATION: post reduction. COMPARISON: Prereduction, same date. FINDINGS: Two views of the left ankle show improved alignment of the previous fracture/dislocation, with cast now in place. Improved alignment. XR ANKLE LT AP/LAT    Result Date: 9/16/2022  EXAM: XR ANKLE LT AP/LAT INDICATION: left ankle injury s/p fall. COMPARISON: None. FINDINGS: Two views of the left ankle demonstrate lateral dislocation at the tibiotalar joint. There is a comminuted fracture of the distal fibular shaft with lateral angulation and fracture fragment overlap. Substantial soft tissue swelling is noted. Vascular calcification is evident. Fracture dislocation as described above. CT HEAD WO CONT    Result Date: 9/16/2022  EXAM: CT HEAD WO CONT INDICATION: fall, head injury, anticoagulated COMPARISON: None. CONTRAST: None. TECHNIQUE: Unenhanced CT of the head was performed using 5 mm images. Brain and bone windows were generated. Coronal and sagittal reformats. CT dose reduction was achieved through use of a standardized protocol tailored for this examination and automatic exposure control for dose modulation. FINDINGS: The ventricles and sulci are normal in size, shape and configuration. . There is no significant white matter disease. There is no intracranial hemorrhage, extra-axial collection, or mass effect. The basilar cisterns are open. No CT evidence of acute infarct.  The bone windows demonstrate no abnormalities. The visualized portions of the paranasal sinuses and mastoid air cells are clear. Vascular calcification is noted. No acute abnormality. XR CHEST PORT    Result Date: 9/16/2022  Indication: Lightheadedness, near syncope Comparison: 1/12/2007 Portable exam of the chest obtained at 1012 demonstrates normal heart size. There is no acute process in the lung fields. The osseous structures are unremarkable. Right jugular catheter tip projects over the SVC. No acute process.

## 2022-09-16 NOTE — CONSULTS
ORTHO CONSULT NOTE    Subjective:     Date of Consultation:  September 16, 2022  Referring Physician: Dr. Keyla Escobar is a 58 y.o. male who is being seen for left ankle fracture dislocation. He describes a feeling lightheaded resulting in a relatively benign sounding twisting injury to the left ankle. Injury  occurred just prior to presentation to the ER this morning. He had immediate onset of pain and deformity of the left ankle. Workup has revealed a displaced comminuted left distal fibular fracture with dislocation of the tibiotalar joint. He reports head trauma but denies LOC during injury. He is neurovascularly intact per attending ER physician, Dr. Johnathon Seay. Due to underlying complex medical history and head injury, Dr. Johnathon Seay did not feel comfortable administering procedural sedation. No family history on file. Social History     Tobacco Use    Smoking status: Not on file    Smokeless tobacco: Not on file   Substance Use Topics    Alcohol use: Not on file     Past Medical History:   Diagnosis Date    Anemia     Chronic kidney disease     Diabetes ([facility]     Dorsalgia     ESRD on hemodialysis ([facility]     Gastro-esophageal reflux     Heart failure (HCC)     Hypertension     Obesity     Polyneuropathy     Sciatica       No past surgical history on file. Prior to Admission medications    Not on File     Current Facility-Administered Medications   Medication Dose Route Frequency    albumin human 25% (BUMINATE) solution 25 g  25 g IntraVENous DIALYSIS PRN     No current outpatient medications on file. Allergies   Allergen Reactions    Adhesive Rash     Paper tape ok    Bactrim [Sulfamethoprim] Rash        Review of Systems:  A comprehensive review of systems was negative except for that written in the HPI.     Mental Status: no dementia    Objective:     Patient Vitals for the past 8 hrs:   BP Temp Pulse Resp SpO2   09/16/22 1128 -- -- 66 13 93 %   09/16/22 1058 -- -- 75 20 97 % 22 0943 (!) 200/77 -- -- -- 93 %   22 0937 (!) 207/71 98.4 °F (36.9 °C) 71 18 97 %     Temp (24hrs), Av.4 °F (36.9 °C), Min:98.4 °F (36.9 °C), Max:98.4 °F (36.9 °C)      PHYSICAL EXAM:   65yo male, pleasant/cooperative, appears stated age, complains of severe left ankle pain, alert/oriented, normal mood, breathing unlabored, obvious deformity of left ankle, superficial skin tear overlying the medial malleolus, no sign of open fracture, generalized swelling/bruising of the left ankle, tneder over medial and lateral ankle, pain with any motion of left ankle, moving toes, senation intact to light touch throughout left foot, pedal pulse intact. Imaging Review:   XR ANKLE LT AP/LAT 2022 10:12  INDICATION: left ankle injury s/p fall. COMPARISON: None. FINDINGS: Two views of the left ankle demonstrate lateral dislocation at the  tibiotalar joint. There is a comminuted fracture of the distal fibular shaft  with lateral angulation and fracture fragment overlap. Substantial soft tissue  swelling is noted. Vascular calcification is evident. IMPRESSION  Fracture dislocation as described above    XR ANKLE LT AP/LAT 2022 11:35  INDICATION: post reduction. COMPARISON: Prereduction, same date. FINDINGS: Two views of the left ankle show improved alignment of the previous  fracture/dislocation, with cast now in place. IMPRESSION  Improved alignment.     Labs:   Recent Results (from the past 24 hour(s))   CBC WITH AUTOMATED DIFF    Collection Time: 22  9:52 AM   Result Value Ref Range    WBC 11.3 (H) 4.1 - 11.1 K/uL    RBC 4.13 4.10 - 5.70 M/uL    HGB 12.9 12.1 - 17.0 g/dL    HCT 36.5 (L) 36.6 - 50.3 %    MCV 88.4 80.0 - 99.0 FL    MCH 31.2 26.0 - 34.0 PG    MCHC 35.3 30.0 - 36.5 g/dL    RDW 12.9 11.5 - 14.5 %    PLATELET 520 934 - 592 K/uL    MPV 10.2 8.9 - 12.9 FL    NRBC 0.0 0  WBC    ABSOLUTE NRBC 0.00 0.00 - 0.01 K/uL    NEUTROPHILS 79 (H) 32 - 75 %    LYMPHOCYTES 15 12 - 49 % MONOCYTES 6 5 - 13 %    EOSINOPHILS 0 0 - 7 %    BASOPHILS 0 0 - 1 %    IMMATURE GRANULOCYTES 0 0.0 - 0.5 %    ABS. NEUTROPHILS 8.8 (H) 1.8 - 8.0 K/UL    ABS. LYMPHOCYTES 1.7 0.8 - 3.5 K/UL    ABS. MONOCYTES 0.7 0.0 - 1.0 K/UL    ABS. EOSINOPHILS 0.0 0.0 - 0.4 K/UL    ABS. BASOPHILS 0.0 0.0 - 0.1 K/UL    ABS. IMM. GRANS. 0.1 (H) 0.00 - 0.04 K/UL    DF AUTOMATED     METABOLIC PANEL, COMPREHENSIVE    Collection Time: 09/16/22  9:52 AM   Result Value Ref Range    Sodium 132 (L) 136 - 145 mmol/L    Potassium 4.1 3.5 - 5.1 mmol/L    Chloride 97 97 - 108 mmol/L    CO2 27 21 - 32 mmol/L    Anion gap 8 5 - 15 mmol/L    Glucose 86 65 - 100 mg/dL    BUN 60 (H) 6 - 20 MG/DL    Creatinine 5.29 (H) 0.70 - 1.30 MG/DL    BUN/Creatinine ratio 11 (L) 12 - 20      GFR est AA 13 (L) >60 ml/min/1.73m2    GFR est non-AA 11 (L) >60 ml/min/1.73m2    Calcium 9.9 8.5 - 10.1 MG/DL    Bilirubin, total 0.3 0.2 - 1.0 MG/DL    ALT (SGPT) 17 12 - 78 U/L    AST (SGOT) 14 (L) 15 - 37 U/L    Alk. phosphatase 122 (H) 45 - 117 U/L    Protein, total 7.6 6.4 - 8.2 g/dL    Albumin 3.4 (L) 3.5 - 5.0 g/dL    Globulin 4.2 (H) 2.0 - 4.0 g/dL    A-G Ratio 0.8 (L) 1.1 - 2.2     TROPONIN-HIGH SENSITIVITY    Collection Time: 09/16/22  9:52 AM   Result Value Ref Range    Troponin-High Sensitivity 54 0 - 76 ng/L   SAMPLES BEING HELD    Collection Time: 09/16/22  9:53 AM   Result Value Ref Range    SAMPLES BEING HELD 1blue 1red     COMMENT        Add-on orders for these samples will be processed based on acceptable specimen integrity and analyte stability, which may vary by analyte. Impression:     Left ankle displaced comminuted distal fibular fracture  Left ankle joint dislocation    Plan:     Discussed with attending orthopedist, Dr. Nneka Zafar. Patient needs immediate reduction of the ankle to preserve neurovascular function an skin integrity. At this time the fracture is closed and does not require urgent I&D in OR. He will ultimately need ORIF.  Discussed with patient and elected to proceed with reduction here in ER utilizing an local ankle joint block. Ankle block and reduction completed successfully without immediate complication (see procedure note). Patient being admitted to medicine for work-up of syncopal episode. Dr. Rosalina Lozano with nephrology saw patient in ER and plans for hemodialysis today. Dr. Garth Arellano to determine timing of surgical intervention. Strict ice/elevation left ankle to control edema and preserve skin integrity. Strict NWB left lower extremity.      Michael Iglesias [facility]  9/16/22

## 2022-09-16 NOTE — CONSULTS
Nephrology Progress Note  Fercho Burk  Date of Admission : 9/16/2022    CC:  Follow up for ESRD       Assessment and Plan     ESRD on HD:  - MWF at [facility]  - HD today, then MWF next week    Volume overload:  - UF 3-4kg as tolerated    HTN:  - resume home meds    Anemia of CKD:  - hold ADONIS    Secondary HPT    L distal fibula fracture:  - per ortho    DM2  Obesity  Noncompliance       Interval History:  57 yo WM w/ a hx of ESRD on HD MWF normally. Hx of DM2, htn, obesity, noncompliance. Missed several days of treatment last week. Dialyzed yesterday at [facility] [place] and was d/c'd. Had a fall this AM and dislocated his L ankle. Fracture noted on plain films. His BP is up. He denies cp, sob, n/v/d at this time. Current Medications: all current  Medications have been eviewed in EPIC  Review of Systems: Pertinent items are noted in HPI. Objective:  Vitals:    Vitals:    09/16/22 0937 09/16/22 0943 09/16/22 1058 09/16/22 1128   BP: (!) 207/71 (!) 200/77     Pulse: 71  75 66   Resp: 18  20 13   Temp: 98.4 °F (36.9 °C)      SpO2: 97% 93% 97% 93%     Intake and Output:  No intake/output data recorded. No intake/output data recorded. Physical Examination:    General: NAD,Conversant   Neck:  Supple, no mass  Resp:  Lungs CTA B/L, no wheezing , normal respiratory effort  CV:  RRR,  no murmur or rub, 2+ LE edema  GI:  Soft, NT, + Bowel sounds, no hepatosplenomegaly  Neurologic:  Non focal  Psych:             AAO x 3 appropriate affect  Skin:  No Rash  Access:           RIJ PC c/d/i    []    High complexity decision making was performed  []    Patient is at high-risk of decompensation with multiple organ involvement    Lab Data Personally Reviewed: I have reviewed all the pertinent labs, microbiology data and radiology studies during assessment.     Recent Labs     09/16/22  0952   *   K 4.1   CL 97   CO2 27   GLU 86   BUN 60*   CREA 5.29*   CA 9.9   ALB 3.4*   ALT 17     Recent Labs 09/16/22  0952   WBC 11.3*   HGB 12.9   HCT 36.5*        No results found for: SDES  No results found for: CULT  Recent Results (from the past 24 hour(s))   CBC WITH AUTOMATED DIFF    Collection Time: 09/16/22  9:52 AM   Result Value Ref Range    WBC 11.3 (H) 4.1 - 11.1 K/uL    RBC 4.13 4.10 - 5.70 M/uL    HGB 12.9 12.1 - 17.0 g/dL    HCT 36.5 (L) 36.6 - 50.3 %    MCV 88.4 80.0 - 99.0 FL    MCH 31.2 26.0 - 34.0 PG    MCHC 35.3 30.0 - 36.5 g/dL    RDW 12.9 11.5 - 14.5 %    PLATELET 446 907 - 416 K/uL    MPV 10.2 8.9 - 12.9 FL    NRBC 0.0 0  WBC    ABSOLUTE NRBC 0.00 0.00 - 0.01 K/uL    NEUTROPHILS 79 (H) 32 - 75 %    LYMPHOCYTES 15 12 - 49 %    MONOCYTES 6 5 - 13 %    EOSINOPHILS 0 0 - 7 %    BASOPHILS 0 0 - 1 %    IMMATURE GRANULOCYTES 0 0.0 - 0.5 %    ABS. NEUTROPHILS 8.8 (H) 1.8 - 8.0 K/UL    ABS. LYMPHOCYTES 1.7 0.8 - 3.5 K/UL    ABS. MONOCYTES 0.7 0.0 - 1.0 K/UL    ABS. EOSINOPHILS 0.0 0.0 - 0.4 K/UL    ABS. BASOPHILS 0.0 0.0 - 0.1 K/UL    ABS. IMM. GRANS. 0.1 (H) 0.00 - 0.04 K/UL    DF AUTOMATED     METABOLIC PANEL, COMPREHENSIVE    Collection Time: 09/16/22  9:52 AM   Result Value Ref Range    Sodium 132 (L) 136 - 145 mmol/L    Potassium 4.1 3.5 - 5.1 mmol/L    Chloride 97 97 - 108 mmol/L    CO2 27 21 - 32 mmol/L    Anion gap 8 5 - 15 mmol/L    Glucose 86 65 - 100 mg/dL    BUN 60 (H) 6 - 20 MG/DL    Creatinine 5.29 (H) 0.70 - 1.30 MG/DL    BUN/Creatinine ratio 11 (L) 12 - 20      GFR est AA 13 (L) >60 ml/min/1.73m2    GFR est non-AA 11 (L) >60 ml/min/1.73m2    Calcium 9.9 8.5 - 10.1 MG/DL    Bilirubin, total 0.3 0.2 - 1.0 MG/DL    ALT (SGPT) 17 12 - 78 U/L    AST (SGOT) 14 (L) 15 - 37 U/L    Alk.  phosphatase 122 (H) 45 - 117 U/L    Protein, total 7.6 6.4 - 8.2 g/dL    Albumin 3.4 (L) 3.5 - 5.0 g/dL    Globulin 4.2 (H) 2.0 - 4.0 g/dL    A-G Ratio 0.8 (L) 1.1 - 2.2     TROPONIN-HIGH SENSITIVITY    Collection Time: 09/16/22  9:52 AM   Result Value Ref Range    Troponin-High Sensitivity 54 0 - 76 ng/L   SAMPLES BEING HELD    Collection Time: 09/16/22  9:53 AM   Result Value Ref Range    SAMPLES BEING HELD 1blue 1red     COMMENT        Add-on orders for these samples will be processed based on acceptable specimen integrity and analyte stability, which may vary by analyte. Jack Scott MD  [place] [id number] [facility]  Phone - [phone]   Fax - [phone]  www. [email or web address]

## 2022-09-16 NOTE — PROGRESS NOTES
Lovenox Monitoring  Indication: DVT Prophylaxis  Recent Labs     09/16/22  0952   HGB 12.9      CREA 5.29*     Current Weight: 149.7 kg  Est. CrCl = ESRD on HD ml/min  Current Dose: 40 mg mg subcutaneously every 24 hours.     Plan: Change to UFH 5000 units subQ Q8hrs  for ESRD on HD and weight 101-150.9 kg per Anticoagulation protocol 3/2022    Anne BonillaD  Clinical Pharmacist  [facility] ()

## 2022-09-17 PROBLEM — S82.842A BIMALLEOLAR FRACTURE OF LEFT ANKLE: Status: ACTIVE | Noted: 2022-09-17

## 2022-09-17 LAB
GLUCOSE BLD STRIP.AUTO-MCNC: 247 MG/DL (ref 65–117)
GLUCOSE BLD STRIP.AUTO-MCNC: 321 MG/DL (ref 65–117)
GLUCOSE BLD STRIP.AUTO-MCNC: 325 MG/DL (ref 65–117)
GLUCOSE BLD STRIP.AUTO-MCNC: 348 MG/DL (ref 65–117)
HBV SURFACE AB SER QL: REACTIVE
HBV SURFACE AB SER-ACNC: 58.88 MIU/ML
HBV SURFACE AG SER QL: <0.1 INDEX
HBV SURFACE AG SER QL: NEGATIVE
LEFT CCA DIST DIAS: 19.9 CM/S
LEFT CCA DIST SYS: 91.1 CM/S
LEFT CCA PROX DIAS: 19.9 CM/S
LEFT CCA PROX SYS: 100.3 CM/S
LEFT ECA DIAS: 18.1 CM/S
LEFT ECA SYS: 103.9 CM/S
LEFT ICA DIST DIAS: 24.4 CM/S
LEFT ICA DIST SYS: 79.7 CM/S
LEFT ICA MID DIAS: 26.1 CM/S
LEFT ICA MID SYS: 109.5 CM/S
LEFT ICA PROX DIAS: 17.3 CM/S
LEFT ICA PROX SYS: 106 CM/S
LEFT ICA/CCA SYS: 1.2 NO UNITS
LEFT VERTEBRAL DIAS: 14.2 CM/S
LEFT VERTEBRAL SYS: 50.5 CM/S
RIGHT CCA DIST DIAS: 11.6 CM/S
RIGHT CCA DIST SYS: 66.3 CM/S
RIGHT CCA PROX DIAS: 12.9 CM/S
RIGHT CCA PROX SYS: 87.3 CM/S
RIGHT ECA DIAS: 10.3 CM/S
RIGHT ECA SYS: 71.6 CM/S
RIGHT ICA DIST DIAS: 23 CM/S
RIGHT ICA DIST SYS: 63.6 CM/S
RIGHT ICA MID DIAS: 18.5 CM/S
RIGHT ICA MID SYS: 59.6 CM/S
RIGHT ICA PROX DIAS: 15 CM/S
RIGHT ICA PROX SYS: 47.9 CM/S
RIGHT ICA/CCA SYS: 1 NO UNITS
RIGHT VERTEBRAL DIAS: 14.7 CM/S
RIGHT VERTEBRAL SYS: 55.7 CM/S
SERVICE CMNT-IMP: ABNORMAL

## 2022-09-17 PROCEDURE — 74011000250 HC RX REV CODE- 250: Performed by: HOSPITALIST

## 2022-09-17 PROCEDURE — 65270000029 HC RM PRIVATE

## 2022-09-17 PROCEDURE — 51798 US URINE CAPACITY MEASURE: CPT

## 2022-09-17 PROCEDURE — 96375 TX/PRO/DX INJ NEW DRUG ADDON: CPT

## 2022-09-17 PROCEDURE — 74011250636 HC RX REV CODE- 250/636: Performed by: HOSPITALIST

## 2022-09-17 PROCEDURE — 74011250637 HC RX REV CODE- 250/637: Performed by: PHYSICIAN ASSISTANT

## 2022-09-17 PROCEDURE — G0378 HOSPITAL OBSERVATION PER HR: HCPCS

## 2022-09-17 PROCEDURE — 74011250636 HC RX REV CODE- 250/636: Performed by: INTERNAL MEDICINE

## 2022-09-17 PROCEDURE — 96376 TX/PRO/DX INJ SAME DRUG ADON: CPT

## 2022-09-17 PROCEDURE — 82962 GLUCOSE BLOOD TEST: CPT

## 2022-09-17 PROCEDURE — 74011250636 HC RX REV CODE- 250/636: Performed by: NURSE PRACTITIONER

## 2022-09-17 PROCEDURE — 99233 SBSQ HOSP IP/OBS HIGH 50: CPT | Performed by: ORTHOPAEDIC SURGERY

## 2022-09-17 PROCEDURE — 74011636637 HC RX REV CODE- 636/637: Performed by: INTERNAL MEDICINE

## 2022-09-17 RX ORDER — INSULIN GLARGINE 100 [IU]/ML
20 INJECTION, SOLUTION SUBCUTANEOUS
Status: DISCONTINUED | OUTPATIENT
Start: 2022-09-17 | End: 2022-09-18

## 2022-09-17 RX ORDER — LEVOTHYROXINE SODIUM 25 UG/1
25 TABLET ORAL
Status: DISCONTINUED | OUTPATIENT
Start: 2022-09-18 | End: 2022-09-18

## 2022-09-17 RX ORDER — ROSUVASTATIN CALCIUM 20 MG/1
20 TABLET, COATED ORAL
COMMUNITY
End: 2022-10-03

## 2022-09-17 RX ORDER — HYDRALAZINE HYDROCHLORIDE 20 MG/ML
20 INJECTION INTRAMUSCULAR; INTRAVENOUS
Status: DISCONTINUED | OUTPATIENT
Start: 2022-09-17 | End: 2022-09-29

## 2022-09-17 RX ORDER — LEVOTHYROXINE SODIUM 25 UG/1
25 TABLET ORAL
COMMUNITY

## 2022-09-17 RX ORDER — INSULIN ASPART 100 [IU]/ML
10 INJECTION, SOLUTION INTRAVENOUS; SUBCUTANEOUS
COMMUNITY

## 2022-09-17 RX ORDER — GABAPENTIN 600 MG/1
600 TABLET ORAL 2 TIMES DAILY
COMMUNITY
End: 2022-10-03

## 2022-09-17 RX ORDER — INSULIN HUMAN 500 [IU]/ML
80 INJECTION, SOLUTION SUBCUTANEOUS EVERY EVENING
COMMUNITY

## 2022-09-17 RX ORDER — FUROSEMIDE 40 MG/1
80 TABLET ORAL 2 TIMES DAILY
COMMUNITY
End: 2022-10-03

## 2022-09-17 RX ORDER — MAGNESIUM SULFATE 100 %
4 CRYSTALS MISCELLANEOUS AS NEEDED
Status: DISCONTINUED | OUTPATIENT
Start: 2022-09-17 | End: 2022-10-03 | Stop reason: HOSPADM

## 2022-09-17 RX ORDER — HYDROXYZINE 25 MG/1
25-100 TABLET, FILM COATED ORAL
COMMUNITY

## 2022-09-17 RX ORDER — INSULIN HUMAN 500 [IU]/ML
175 INJECTION, SOLUTION SUBCUTANEOUS EVERY MORNING
COMMUNITY

## 2022-09-17 RX ORDER — LOSARTAN POTASSIUM 100 MG/1
100 TABLET ORAL DAILY
COMMUNITY
End: 2022-10-03

## 2022-09-17 RX ORDER — LOSARTAN POTASSIUM 50 MG/1
100 TABLET ORAL DAILY
Status: DISCONTINUED | OUTPATIENT
Start: 2022-09-18 | End: 2022-09-24

## 2022-09-17 RX ORDER — OXYCODONE HYDROCHLORIDE 10 MG/1
10 TABLET ORAL
Status: ON HOLD | COMMUNITY
End: 2022-09-17

## 2022-09-17 RX ORDER — OXYCODONE HYDROCHLORIDE 5 MG/1
5-10 TABLET ORAL
Status: DISCONTINUED | OUTPATIENT
Start: 2022-09-17 | End: 2022-09-19

## 2022-09-17 RX ORDER — INSULIN LISPRO 100 [IU]/ML
INJECTION, SOLUTION INTRAVENOUS; SUBCUTANEOUS
Status: DISCONTINUED | OUTPATIENT
Start: 2022-09-17 | End: 2022-10-03 | Stop reason: HOSPADM

## 2022-09-17 RX ADMIN — FENTANYL CITRATE 25 MCG: 50 INJECTION, SOLUTION INTRAMUSCULAR; INTRAVENOUS at 01:26

## 2022-09-17 RX ADMIN — OXYCODONE 5 MG: 5 TABLET ORAL at 22:09

## 2022-09-17 RX ADMIN — ONDANSETRON 4 MG: 2 INJECTION INTRAMUSCULAR; INTRAVENOUS at 01:50

## 2022-09-17 RX ADMIN — FENTANYL CITRATE 25 MCG: 50 INJECTION, SOLUTION INTRAMUSCULAR; INTRAVENOUS at 11:53

## 2022-09-17 RX ADMIN — HEPARIN SODIUM 2200 UNITS: 1000 INJECTION INTRAVENOUS; SUBCUTANEOUS at 02:13

## 2022-09-17 RX ADMIN — OXYCODONE 5 MG: 5 TABLET ORAL at 14:04

## 2022-09-17 RX ADMIN — FENTANYL CITRATE 25 MCG: 50 INJECTION, SOLUTION INTRAMUSCULAR; INTRAVENOUS at 16:35

## 2022-09-17 RX ADMIN — Medication 7 UNITS: at 11:54

## 2022-09-17 RX ADMIN — OXYCODONE 10 MG: 5 TABLET ORAL at 18:07

## 2022-09-17 RX ADMIN — SODIUM CHLORIDE, PRESERVATIVE FREE 10 ML: 5 INJECTION INTRAVENOUS at 14:02

## 2022-09-17 RX ADMIN — HEPARIN SODIUM 2100 UNITS: 1000 INJECTION INTRAVENOUS; SUBCUTANEOUS at 02:13

## 2022-09-17 RX ADMIN — HEPARIN SODIUM 5000 UNITS: 5000 INJECTION INTRAVENOUS; SUBCUTANEOUS at 15:34

## 2022-09-17 RX ADMIN — Medication 7 UNITS: at 16:42

## 2022-09-17 RX ADMIN — OXYCODONE 10 MG: 5 TABLET ORAL at 08:26

## 2022-09-17 RX ADMIN — ONDANSETRON 4 MG: 2 INJECTION INTRAMUSCULAR; INTRAVENOUS at 11:52

## 2022-09-17 RX ADMIN — SODIUM CHLORIDE, PRESERVATIVE FREE 10 ML: 5 INJECTION INTRAVENOUS at 01:26

## 2022-09-17 RX ADMIN — FENTANYL CITRATE 25 MCG: 50 INJECTION, SOLUTION INTRAMUSCULAR; INTRAVENOUS at 20:38

## 2022-09-17 RX ADMIN — INSULIN GLARGINE 20 UNITS: 100 INJECTION, SOLUTION SUBCUTANEOUS at 18:01

## 2022-09-17 RX ADMIN — Medication 4 UNITS: at 22:08

## 2022-09-17 NOTE — PROCEDURES
Hemodialysis / [phone]    Vitals Pre Post Assessment Pre Post   BP BP: (!) 145/72 (09/16/22 2315)   107/45 LOC A/Ox4 No change   HR Pulse (Heart Rate): 64 (09/16/22 2315) 70 Lungs Diminished No change   Resp Resp Rate: 9 (09/16/22 2315) 24 Cardiac PVCs No change   Temp Temp: 98.1 °F (36.7 °C) (09/16/22 2315) 97.7 Skin Warm/Dry No change   Weight    Edema Generalized 2+  L ankle 3+ r/t fall PTA No change   Tele status Sinus w/PVCs No change Pain Pain Intensity 1: 8 (09/16/22 2130) 8     Orders   Duration: Start: 7614 End: 0215 Total: 3   Dialyzer: Dialyzer/Set Up Inspection: Sanjay Boss (09/16/22 2315)   K Bath: Dialysate K (mEq/L): 2 (09/16/22 2315)   Ca Bath: Dialysate CA (mEq/L): 2.5 (09/16/22 2315)   Na: Dialysate NA (mEq/L): 138 (09/16/22 2315)   Bicarb: Dialysate HCO3 (mEq/L): 35 (09/16/22 2315)   Target Fluid Removal: Goal/Amount of Fluid to Remove (mL): 4000 mL (09/16/22 2315)     Access   Type & Location: Rt Chest PC : Dressing CDI. No s/s of infection. Both lumens aspirate & flush well. Running well at .     Comments:                                        Labs   HBsAg (Antigen) / date: Negative 9/16/2022                                              HBsAb (Antibody) / date: Immune 9/16/2022   Source: Epic   Obtained/Reviewed  Critical Results Called HGB   Date Value Ref Range Status   09/16/2022 12.9 12.1 - 17.0 g/dL Final     Potassium   Date Value Ref Range Status   09/16/2022 4.1 3.5 - 5.1 mmol/L Final     Calcium   Date Value Ref Range Status   09/16/2022 9.9 8.5 - 10.1 MG/DL Final     BUN   Date Value Ref Range Status   09/16/2022 60 (H) 6 - 20 MG/DL Final     Creatinine   Date Value Ref Range Status   09/16/2022 5.29 (H) 0.70 - 1.30 MG/DL Final        Meds Given   Name Dose Route   Heparin 1:1000 2.1ml & 2.2 ml Catheter Dwell               Adequacy / Fluid    Total Liters Process: 63L   Net Fluid Removed: 2514 ml      Comments   Time Out Done:   (Time) 2310   Admitting Diagnosis: Ankle Fracture   Consent obtained/signed: Informed Consent Verified: Yes (center consent) (09/16/22 2315)   Machine / RO # Machine Number: [id number] (09/16/22 0872)   Primary Nurse Rpt Pre: Angelica Valdes RN   Primary Nurse Rpt Post: Angelica Valdes RN   Pt Education: Procedural   Care Plan: Ongoing   Pts outpatient clinic: [facility]     Tx Summary   SBAR received from Primary RN. Pt arrived to HD suite A&Ox4. Consent signed & on file. 2315: Each catheter limb disinfected per p&p, caps removed, hubs disinfected per p&p. Each lumen aspirated for blood return and flushed with Normal Saline per policy. Labs drawn per request/ order. VSS. Dialysis Tx initiated. 0115: Pt c/o pain. Primary RN notified. 0145: Pt c/o nausea, primary RN notified for PRN medication. 0158: Pt c/o generally feeling bad. Feels as he is going to throw up, still in pain. UF off to see if nausea can subside. 0210: Pt still feeling unwell. Pt requesting for treatment to be terminated. 0215: Tx ended. VSS. Each dialysis catheter limb disinfected per p&p, all possible blood returned per p&p, and each dialysis hub disinfected per p&p. Each lumen flushed, post dialysis catheter Heparin dwell instilled per order, and caps applied. Bed locked and in the lowest position, call bell and belongings in reach. SBAR given to Primary, RN. Patient is stable at time of my departure. All Dialysis related medications have been reviewed. PAUL Jain notified of treatment termination and pt feeling unwell.

## 2022-09-17 NOTE — PROGRESS NOTES
Admission Medication Reconciliation:    Information obtained from:  Fidelia Berry in [employer], RxQuery  RxQuery data available¹:  YES    Comments/Recommendations: Updated PTA meds/reviewed patient's allergies. 1)  Patient is a poor historian and wasn't able to name his medications for me. I reached out to Fidelia Berry on [address] ([phone]) to get medication history for patient. Of note, this MR relied SOLELY on medication fill history from the patient for the past 90 days (I.e. only meds filled at this pharmacy are reflected in the MR)    2) Spoke to patient re: Insulin regimen. He states he takes two insulins, one U500 AM and PM + another insulin 10 units TID with meals (which lines up with RxQuery; of note though, the last U500 insulin was filled for 35 DS on 7/25)    2) Of note, oxycodone 10mg, 1 tab q6 prn for pain was filled 9/13 for 3DS. Doesn't appear to be a regular maintenance medication per fill history at [place] [address]. 2)  Medication changes (since last review): Added  - Furosemide 40mg, 2 tab BID, last filled 8/2 for 30DS  - Gabapentin 600mg, 1 tab BID, last filled 9/13 for 30DS  - Hydroxyzine 25mg, 1-4 tab q8h PRN for insomnia, anxiety; last filled June 2022   - Insulin Aspart 100 units/mL pen; 10 units TID with meals; last filled 6/30 for 83DS  - Humulin R, U-500, 175 units every AM + 80 units every PM; last filled 7/25 for 35DS  - Levothyroxie 25mcg, 1 tab once daily, last filled 8/2 for 90DS  - Losartan 100mg, 1 tab daily, last filled 9/1 for 30DS  - Rosuvastatin 20mg, 1 tab nightly, last filled 8/2 for 90DS    Adjusted  - n/a    Removed  - n/a     ¹RxQuery pharmacy benefit data reflects medications filled and processed through the patient's insurance, however   this data does NOT capture whether the medication was picked up or is currently being taken by the patient.     Allergies:  Adhesive and Bactrim [sulfamethoprim]    Significant PMH/Disease States:   Past Medical History:   Diagnosis Date    Anemia     Chronic kidney disease     Diabetes ([facility] [address] [address]     Dorsalgia     ESRD on hemodialysis ([facility] [address]     Gastro-esophageal reflux     Heart failure ([facility] [address]     Hypertension     Obesity     Polyneuropathy     Sciatica      Chief Complaint for this Admission:    Chief Complaint   Patient presents with    Ankle Injury     Prior to Admission Medications:   Prior to Admission Medications   Prescriptions Last Dose Informant Taking?   furosemide (LASIX) 40 mg tablet  Other Yes   Sig: Take 80 mg by mouth two (2) times a day.   gabapentin (NEURONTIN) 600 mg tablet  Other Yes   Sig: Take 600 mg by mouth two (2) times a day.   hydrOXYzine HCL (ATARAX) 25 mg tablet  Other Yes   Sig: Take  mg by mouth every eight (8) hours as needed for Anxiety or Sleep. insulin CONCENTRATED regular (HumuLIN R U-500) 500 unit/mL soln  Other Yes   Si Units by SubCUTAneous route Every morning. insulin CONCENTRATED regular (HumuLIN R U-500) 500 unit/mL soln  Other Yes   Si Units by SubCUTAneous route every evening. insulin aspart U-100 (NOVOLOG) 100 unit/mL (3 mL) inpn  Other Yes   Sig: 10 Units by SubCUTAneous route Before breakfast, lunch, and dinner. levothyroxine (SYNTHROID) 25 mcg tablet  Other Yes   Sig: Take 25 mcg by mouth Daily (before breakfast). losartan (COZAAR) 100 mg tablet  Other Yes   Sig: Take 100 mg by mouth daily. rosuvastatin (CRESTOR) 20 mg tablet  Other Yes   Sig: Take 20 mg by mouth nightly. Facility-Administered Medications: None     Please contact the main inpatient pharmacy with any questions or concerns at [phone] and we will direct you to the clinical pharmacist covering this patient's care while in-house.    Aimee Pantoja, AAROND

## 2022-09-17 NOTE — PROGRESS NOTES
[address] [facility] Adult  Hospitalist Group                                                                                          Hospitalist Progress Note  Portia Shane MD  Answering service: [phone] from in house phone        Date of Service:  2022  NAME:  Anjelica Perez  :  1960  MRN:  [id number]      Admission Summary:   Anjelica Perez is a 58 y.o. male who presents with fall and ankle fracture. Patient has past medical history of end-stage renal disease on hemodialysis. He said he struck his head he complains of left ankle injury. There was an injury over the medial malleolus . Right now patient complains of severe ankle pain was sedated for left ankle stabilization. Denies any chest pain, shortness of breath, fevers or recent illnesses. No headache. He does receive heparin for dialysis. He was due for dialysis today and typically receives it  and Friday. Last dialysis was Wednesday. He sees Dr. Anuja Clemons. Nephrology consulted for resuming hemodialysis     Interval history / Subjective:   S/p HD last night  No acute overnight events  He is adamant that his fall was because his legs \"buckled\" and just gave out on him and he recalls everything  He completely denies any dizziness, lightheadedness, loss of vision, LOC, palpitations, or any other pre-syncope symptoms  NAD     Assessment & Plan:     Left ankle dislocation, left distal fibula fracture s/p mechanical fall  S/p Closed reduction in ED  Per ortho: Ice/elevate;  If he is physically able, may work on bed to chair transfers with PT; strict NWB LLE  Pain control  [facility]  Mild leukocytosis likely reactive 2/2 this     ESRD on HD MWF  Anemia of CKD  Nephrology following  S/p HD late      Mechanical fall, \"legs buckled\"  Denies any pre-syncopal symptoms  No indication for syncope workup or imaging  Could check orthostats with PT (though NWB LLE)  CT head no acute abnormality  Carotid Doppler w/o b/l ICA stenosis, VAs patent, incidental large left thyroid nodule - f/up PCP for thyroid US     HTN  Need to confirm home meds  IV PRN    DM2, check a1c  SSI      Code status: Full Code  Prophylaxis: Heparin ppx  Care Plan discussed with: Patient/Family  Anticipated Disposition: Home  Anticipated Discharge: Greater than 48 hours     Hospital Problems  Never Reviewed            Codes Class Noted POA    Syncope ICD-10-CM: R55  ICD-9-CM: 780.2  9/16/2022 Unknown           Review of Systems:   Pertinent items are noted in HPI    Vital Signs:    Last 24hrs VS reviewed since prior progress note. Most recent are:  Visit Vitals  BP (!) 155/83 (BP 1 Location: Right arm, BP Patient Position: At rest)   Pulse 77   Temp 98.9 °F (37.2 °C)   Resp 12   Ht 6' (1.829 m)   Wt 147 kg (324 lb)   SpO2 97%   BMI 43.94 kg/m²         Intake/Output Summary (Last 24 hours) at 9/17/2022 1304  Last data filed at 9/17/2022 0215  Gross per 24 hour   Intake --   Output 3239 ml   Net -3239 ml        Physical Examination:   I had a face to face encounter with this patient and independently examined them on 9/17/2022 as outlined below:    General: Alert, cooperative, no acute distress    EENT:  EOMI. Anicteric sclerae. MMM  Resp:  CTA bilaterally, no wheezing or rales. No accessory muscle use  CV:  RRR, No audible murmur  GI:  Soft, Non distended, Non tender  Neurologic:  Alert and oriented, normal speech, ENAMORADO  Extremities: LLE wrapped, tender. RLE no edema or cyanosis  Psych:   Not anxious or agitated  Skin:  No rashes.  No jaundice       Data Review:   I personally reviewed: vitals, labs, imaging results, notes    Labs:     Recent Labs     09/16/22 0952   WBC 11.3*   HGB 12.9   HCT 36.5*        Recent Labs     09/16/22 0952   *   K 4.1   CL 97   CO2 27   BUN 60*   CREA 5.29*   GLU 86   CA 9.9     Recent Labs     09/16/22 0952   ALT 17   *   TBILI 0.3   TP 7.6   ALB 3.4* GLOB 4.2*     No results for input(s): INR, PTP, APTT, INREXT in the last 72 hours. No results for input(s): FE, TIBC, PSAT, FERR in the last 72 hours. No results found for: FOL, RBCF   No results for input(s): PH, PCO2, PO2 in the last 72 hours. No results for input(s): CPK, CKNDX, TROIQ in the last 72 hours.     No lab exists for component: CPKMB  Lab Results   Component Value Date/Time    Cholesterol, total 142 11/30/2011 09:00 AM    HDL Cholesterol 22 11/30/2011 09:00 AM    LDL, calculated 53.2 11/30/2011 09:00 AM    Triglyceride 334 (H) 11/30/2011 09:00 AM    CHOL/HDL Ratio 6.5 (H) 11/30/2011 09:00 AM     Lab Results   Component Value Date/Time    Glucose (POC) 321 (H) 09/17/2022 11:45 AM    Glucose (POC) 247 (H) 09/17/2022 08:37 AM     Lab Results   Component Value Date/Time    Color YELLOW/STRAW 09/16/2022 09:00 PM    Appearance CLEAR 09/16/2022 09:00 PM    Specific gravity 1.017 09/16/2022 09:00 PM    pH (UA) 5.5 09/16/2022 09:00 PM    Protein >300 (A) 09/16/2022 09:00 PM    Glucose 250 (A) 09/16/2022 09:00 PM    Ketone Negative 09/16/2022 09:00 PM    Bilirubin Negative 09/16/2022 09:00 PM    Urobilinogen 0.2 09/16/2022 09:00 PM    Nitrites Negative 09/16/2022 09:00 PM    Leukocyte Esterase Negative 09/16/2022 09:00 PM    Epithelial cells FEW 09/16/2022 09:00 PM    Bacteria Negative 09/16/2022 09:00 PM    WBC 0-4 09/16/2022 09:00 PM    RBC 5-10 09/16/2022 09:00 PM       Medications Reviewed:     Current Facility-Administered Medications   Medication Dose Route Frequency    oxyCODONE IR (ROXICODONE) tablet 5-10 mg  5-10 mg Oral Q4H PRN    insulin lispro (HUMALOG) injection   SubCUTAneous AC&HS    glucose chewable tablet 16 g  4 Tablet Oral PRN    glucagon (GLUCAGEN) injection 1 mg  1 mg IntraMUSCular PRN    dextrose 10 % infusion 0-250 mL  0-250 mL IntraVENous PRN    albumin human 25% (BUMINATE) solution 25 g  25 g IntraVENous DIALYSIS PRN    sodium chloride (NS) flush 5-40 mL  5-40 mL IntraVENous Q8H sodium chloride (NS) flush 5-40 mL  5-40 mL IntraVENous PRN    polyethylene glycol (MIRALAX) packet 17 g  17 g Oral DAILY PRN    ondansetron (ZOFRAN ODT) tablet 4 mg  4 mg Oral Q8H PRN    Or    ondansetron (ZOFRAN) injection 4 mg  4 mg IntraVENous Q6H PRN    hydrALAZINE (APRESOLINE) 20 mg/mL injection 20 mg  20 mg IntraVENous Q6H PRN    heparin (porcine) injection 5,000 Units  5,000 Units SubCUTAneous Q8H    acetaminophen (TYLENOL) tablet 650 mg  650 mg Oral Q4H PRN    Or    acetaminophen (TYLENOL) suppository 650 mg  650 mg Rectal Q6H PRN    fentaNYL citrate (PF) injection 25 mcg  25 mcg IntraVENous Q4H PRN    heparin (porcine) 1,000 unit/mL injection 2,200 Units  2,200 Units InterCATHeter DIALYSIS PRN    And    heparin (porcine) 1,000 unit/mL injection 2,100 Units  2,100 Units InterCATHeter DIALYSIS PRN     ______________________________________________________________________  EXPECTED LENGTH OF STAY: - - -  ACTUAL LENGTH OF STAY:          0                 Jaxson Menard MD

## 2022-09-17 NOTE — PROGRESS NOTES
Fentanyl IVP utilized as pain medicine while pt is receiving HD.  SaO2 monitoring in place, Janae@ [facility]

## 2022-09-17 NOTE — PROGRESS NOTES
Problem: Falls - Risk of  Goal: *Absence of Falls  Description: Document Joellen Black Fall Risk and appropriate interventions in the flowsheet.   Outcome: Progressing Towards Goal  Note: Fall Risk Interventions:            Medication Interventions: Bed/chair exit alarm, Evaluate medications/consider consulting pharmacy, Patient to call before getting OOB, Teach patient to arise slowly    Elimination Interventions: Bed/chair exit alarm, Call light in reach, Patient to call for help with toileting needs, Stay With Me (per policy)    History of Falls Interventions: Bed/chair exit alarm, Door open when patient unattended, Room close to nurse's station         Problem: Pain  Goal: *Control of Pain  Outcome: Progressing Towards Goal     Problem: Patient Education: Go to Patient Education Activity  Goal: Patient/Family Education  Outcome: Progressing Towards Goal     Problem: Patient Education: Go to Patient Education Activity  Goal: Patient/Family Education  Outcome: Progressing Towards Goal

## 2022-09-17 NOTE — PROGRESS NOTES
Problem: Falls - Risk of  Goal: *Absence of Falls  Description: Document Alexa Ground Fall Risk and appropriate interventions in the flowsheet. Outcome: Progressing Towards Goal  Note: Fall Risk Interventions:            Medication Interventions: Bed/chair exit alarm, Evaluate medications/consider consulting pharmacy, Patient to call before getting OOB, Teach patient to arise slowly    Elimination Interventions: Bed/chair exit alarm, Call light in reach, Patient to call for help with toileting needs, Stay With Me (per policy)    History of Falls Interventions: Bed/chair exit alarm, Door open when patient unattended, Room close to nurse's station         Problem: Patient Education: Go to Patient Education Activity  Goal: Patient/Family Education  Outcome: Progressing Towards Goal     Problem: Pain  Goal: *Control of Pain  Outcome: Progressing Towards Goal  Goal: *PALLIATIVE CARE:  Alleviation of Pain  Outcome: Progressing Towards Goal     Problem: Patient Education: Go to Patient Education Activity  Goal: Patient/Family Education  Outcome: Progressing Towards Goal     Problem: Pressure Injury - Risk of  Goal: *Prevention of pressure injury  Description: Document See Scale and appropriate interventions in the flowsheet. Outcome: Progressing Towards Goal  Note: Pressure Injury Interventions:             Activity Interventions: Increase time out of bed, Pressure redistribution bed/mattress(bed type), PT/OT evaluation    Mobility Interventions: Chair cushion, HOB 30 degrees or less, Pressure redistribution bed/mattress (bed type)    Nutrition Interventions: Discuss nutritional consult with provider, Document food/fluid/supplement intake    Friction and Shear Interventions: Foam dressings/transparent film/skin sealants, HOB 30 degrees or less, Minimize layers

## 2022-09-18 LAB
ANION GAP SERPL CALC-SCNC: 9 MMOL/L (ref 5–15)
BUN SERPL-MCNC: 51 MG/DL (ref 6–20)
BUN/CREAT SERPL: 9 (ref 12–20)
CALCIUM SERPL-MCNC: 9.5 MG/DL (ref 8.5–10.1)
CHLORIDE SERPL-SCNC: 94 MMOL/L (ref 97–108)
CO2 SERPL-SCNC: 26 MMOL/L (ref 21–32)
CREAT SERPL-MCNC: 5.42 MG/DL (ref 0.7–1.3)
ERYTHROCYTE [DISTWIDTH] IN BLOOD BY AUTOMATED COUNT: 12.5 % (ref 11.5–14.5)
EST. AVERAGE GLUCOSE BLD GHB EST-MCNC: 321 MG/DL
GLUCOSE BLD STRIP.AUTO-MCNC: 249 MG/DL (ref 65–117)
GLUCOSE BLD STRIP.AUTO-MCNC: 269 MG/DL (ref 65–117)
GLUCOSE BLD STRIP.AUTO-MCNC: 279 MG/DL (ref 65–117)
GLUCOSE BLD STRIP.AUTO-MCNC: 348 MG/DL (ref 65–117)
GLUCOSE SERPL-MCNC: 245 MG/DL (ref 65–100)
HBA1C MFR BLD: 12.8 % (ref 4–5.6)
HCT VFR BLD AUTO: 31.8 % (ref 36.6–50.3)
HGB BLD-MCNC: 11.3 G/DL (ref 12.1–17)
MAGNESIUM SERPL-MCNC: 2 MG/DL (ref 1.6–2.4)
MCH RBC QN AUTO: 31.8 PG (ref 26–34)
MCHC RBC AUTO-ENTMCNC: 35.5 G/DL (ref 30–36.5)
MCV RBC AUTO: 89.6 FL (ref 80–99)
NRBC # BLD: 0 K/UL (ref 0–0.01)
NRBC BLD-RTO: 0 PER 100 WBC
PHOSPHATE SERPL-MCNC: 7.1 MG/DL (ref 2.6–4.7)
PLATELET # BLD AUTO: 150 K/UL (ref 150–400)
PMV BLD AUTO: 10.3 FL (ref 8.9–12.9)
POTASSIUM SERPL-SCNC: 4.2 MMOL/L (ref 3.5–5.1)
RBC # BLD AUTO: 3.55 M/UL (ref 4.1–5.7)
SERVICE CMNT-IMP: ABNORMAL
SODIUM SERPL-SCNC: 129 MMOL/L (ref 136–145)
TSH SERPL DL<=0.05 MIU/L-ACNC: 0.25 UIU/ML (ref 0.36–3.74)
WBC # BLD AUTO: 8.4 K/UL (ref 4.1–11.1)

## 2022-09-18 PROCEDURE — 80048 BASIC METABOLIC PNL TOTAL CA: CPT

## 2022-09-18 PROCEDURE — 74011250636 HC RX REV CODE- 250/636: Performed by: INTERNAL MEDICINE

## 2022-09-18 PROCEDURE — 85027 COMPLETE CBC AUTOMATED: CPT

## 2022-09-18 PROCEDURE — 74011000250 HC RX REV CODE- 250: Performed by: HOSPITALIST

## 2022-09-18 PROCEDURE — 82962 GLUCOSE BLOOD TEST: CPT

## 2022-09-18 PROCEDURE — 65270000029 HC RM PRIVATE

## 2022-09-18 PROCEDURE — 36415 COLL VENOUS BLD VENIPUNCTURE: CPT

## 2022-09-18 PROCEDURE — 74011250637 HC RX REV CODE- 250/637: Performed by: NURSE PRACTITIONER

## 2022-09-18 PROCEDURE — 74011250637 HC RX REV CODE- 250/637: Performed by: INTERNAL MEDICINE

## 2022-09-18 PROCEDURE — 83036 HEMOGLOBIN GLYCOSYLATED A1C: CPT

## 2022-09-18 PROCEDURE — 84100 ASSAY OF PHOSPHORUS: CPT

## 2022-09-18 PROCEDURE — 51798 US URINE CAPACITY MEASURE: CPT

## 2022-09-18 PROCEDURE — 84443 ASSAY THYROID STIM HORMONE: CPT

## 2022-09-18 PROCEDURE — 74011250636 HC RX REV CODE- 250/636: Performed by: HOSPITALIST

## 2022-09-18 PROCEDURE — 74011636637 HC RX REV CODE- 636/637: Performed by: INTERNAL MEDICINE

## 2022-09-18 PROCEDURE — 83735 ASSAY OF MAGNESIUM: CPT

## 2022-09-18 PROCEDURE — 74011250637 HC RX REV CODE- 250/637: Performed by: PHYSICIAN ASSISTANT

## 2022-09-18 PROCEDURE — 74011250636 HC RX REV CODE- 250/636: Performed by: NURSE PRACTITIONER

## 2022-09-18 RX ORDER — GABAPENTIN 600 MG/1
300 TABLET ORAL 2 TIMES DAILY
Status: COMPLETED | OUTPATIENT
Start: 2022-09-18 | End: 2022-09-19

## 2022-09-18 RX ORDER — AMOXICILLIN 250 MG
1 CAPSULE ORAL DAILY
Status: DISCONTINUED | OUTPATIENT
Start: 2022-09-18 | End: 2022-10-03 | Stop reason: HOSPADM

## 2022-09-18 RX ORDER — INSULIN LISPRO 100 [IU]/ML
10 INJECTION, SOLUTION INTRAVENOUS; SUBCUTANEOUS
Status: DISCONTINUED | OUTPATIENT
Start: 2022-09-18 | End: 2022-10-03 | Stop reason: HOSPADM

## 2022-09-18 RX ORDER — ACETAMINOPHEN 500 MG
1000 TABLET ORAL 2 TIMES DAILY
Status: DISCONTINUED | OUTPATIENT
Start: 2022-09-18 | End: 2022-10-03 | Stop reason: HOSPADM

## 2022-09-18 RX ORDER — INSULIN GLARGINE 100 [IU]/ML
30 INJECTION, SOLUTION SUBCUTANEOUS
Status: DISCONTINUED | OUTPATIENT
Start: 2022-09-18 | End: 2022-09-19

## 2022-09-18 RX ADMIN — INSULIN GLARGINE 30 UNITS: 100 INJECTION, SOLUTION SUBCUTANEOUS at 22:01

## 2022-09-18 RX ADMIN — Medication 7 UNITS: at 11:39

## 2022-09-18 RX ADMIN — ACETAMINOPHEN 1000 MG: 500 TABLET, FILM COATED ORAL at 17:39

## 2022-09-18 RX ADMIN — GABAPENTIN 300 MG: 600 TABLET, FILM COATED ORAL at 17:39

## 2022-09-18 RX ADMIN — SODIUM CHLORIDE, PRESERVATIVE FREE 10 ML: 5 INJECTION INTRAVENOUS at 06:21

## 2022-09-18 RX ADMIN — Medication 10 UNITS: at 16:14

## 2022-09-18 RX ADMIN — LOSARTAN POTASSIUM 100 MG: 50 TABLET, FILM COATED ORAL at 09:03

## 2022-09-18 RX ADMIN — Medication 3 UNITS: at 16:14

## 2022-09-18 RX ADMIN — Medication 5 UNITS: at 09:02

## 2022-09-18 RX ADMIN — Medication 10 UNITS: at 12:40

## 2022-09-18 RX ADMIN — HYDRALAZINE HYDROCHLORIDE 20 MG: 20 INJECTION INTRAMUSCULAR; INTRAVENOUS at 06:20

## 2022-09-18 RX ADMIN — HYDRALAZINE HYDROCHLORIDE 20 MG: 20 INJECTION INTRAMUSCULAR; INTRAVENOUS at 17:45

## 2022-09-18 RX ADMIN — DOCUSATE SODIUM 50MG AND SENNOSIDES 8.6MG 1 TABLET: 8.6; 5 TABLET, FILM COATED ORAL at 09:03

## 2022-09-18 RX ADMIN — SODIUM CHLORIDE, PRESERVATIVE FREE 10 ML: 5 INJECTION INTRAVENOUS at 13:31

## 2022-09-18 RX ADMIN — GABAPENTIN 300 MG: 600 TABLET, FILM COATED ORAL at 13:26

## 2022-09-18 RX ADMIN — SODIUM CHLORIDE, PRESERVATIVE FREE 10 ML: 5 INJECTION INTRAVENOUS at 22:30

## 2022-09-18 RX ADMIN — OXYCODONE 10 MG: 5 TABLET ORAL at 16:09

## 2022-09-18 RX ADMIN — HEPARIN SODIUM 5000 UNITS: 5000 INJECTION INTRAVENOUS; SUBCUTANEOUS at 09:02

## 2022-09-18 RX ADMIN — OXYCODONE 10 MG: 5 TABLET ORAL at 02:29

## 2022-09-18 RX ADMIN — ACETAMINOPHEN 1000 MG: 500 TABLET, FILM COATED ORAL at 09:02

## 2022-09-18 RX ADMIN — HEPARIN SODIUM 5000 UNITS: 5000 INJECTION INTRAVENOUS; SUBCUTANEOUS at 00:00

## 2022-09-18 RX ADMIN — FENTANYL CITRATE 25 MCG: 50 INJECTION, SOLUTION INTRAMUSCULAR; INTRAVENOUS at 01:31

## 2022-09-18 RX ADMIN — HEPARIN SODIUM 5000 UNITS: 5000 INJECTION INTRAVENOUS; SUBCUTANEOUS at 16:09

## 2022-09-18 RX ADMIN — Medication 3 UNITS: at 22:01

## 2022-09-18 NOTE — PROGRESS NOTES
Problem: Falls - Risk of  Goal: *Absence of Falls  Description: Document Darmore Lowgap Fall Risk and appropriate interventions in the flowsheet. Outcome: Progressing Towards Goal  Note: Fall Risk Interventions:            Medication Interventions: Bed/chair exit alarm, Evaluate medications/consider consulting pharmacy, Patient to call before getting OOB, Teach patient to arise slowly    Elimination Interventions: Bed/chair exit alarm, Call light in reach, Patient to call for help with toileting needs, Stay With Me (per policy)    History of Falls Interventions: Bed/chair exit alarm, Door open when patient unattended, Room close to nurse's station         Problem: Pain  Goal: *Control of Pain  Outcome: Progressing Towards Goal     Problem: Patient Education: Go to Patient Education Activity  Goal: Patient/Family Education  Outcome: Progressing Towards Goal     Problem: Patient Education: Go to Patient Education Activity  Goal: Patient/Family Education  Outcome: Progressing Towards Goal     Problem: Diabetes Self-Management  Goal: *Using medications safely  Description: State effect of diabetes medications on diabetes; name diabetes medication taking, action and side effects.   Outcome: Progressing Towards Goal  Goal: *Developing strategies to address psychosocial issues  Description: Describe feelings about living with diabetes; identify support needed and support network  Outcome: Progressing Towards Goal     Problem: Patient Education: Go to Patient Education Activity  Goal: Patient/Family Education  Outcome: Progressing Towards Goal

## 2022-09-18 NOTE — PROGRESS NOTES
Transition of Care Plan: likely home when medically ready    RUR: 12% low    PCP F/U: Dr. Yeimy Lala    Disposition: likely home when medically ready    Transportation: spouse    Dialysis: MWF at 12:30pm [facility], [address] [facility]    Main Contact: spouse-Charisse Lafleur-patient unable to provide contact number    [phone]: Met with patient at the bedside. Introduced self and role of CM. Patient is A&Ox4. Confirmed demographics. States is independent and lives with his spouse, Amelia Sender. Unable to provide phone number at this time. Anticipate patient will return home at discharge when medically ready. Will continue to follow    Rodolfo Goins RN, CRM    Medicare pt has received, reviewed, and signed 1st IM letter informing them of their right to appeal the discharge. Signed copy has been placed on pt bedside chart. Residency:  apartment  Lives With: spouse  Prior functioning:  independent  Prior DME used: walker  Rehab history: none  Pharmacy: [facility]    Reason for Admission:  Bimalleolar fracture of left ankle, syncope                   RUR Score:  12% low                   Plan for utilizing home health: TBD-no therapy orders         PCP: First and Last name:  Karine Pollard MD     Name of Practice:    Are you a current patient: Yes/No: yes   Approximate date of last visit: couple of weeks ago   Can you participate in a virtual visit with your PCP:                     Current Advanced Directive/Advance Care Plan: Full Code    Healthcare Decision Maker:   Click here to complete [address] including selection of the Healthcare Decision Maker Relationship (ie \"Primary\")  spouse-Charisse Lafleur-patient unable to provide contact number                  Transition of Care Plan:  likely home when medically ready                  Care Management Interventions  PCP Verified by CM: Yes  Mode of Transport at Discharge:  Other (see comment) (wife)  Support Systems: Spouse/Significant Other  Confirm Follow Up Transport: Family  Discharge Location  Patient Expects to be Discharged to[de-identified] Home

## 2022-09-18 NOTE — PROGRESS NOTES
Problem: Falls - Risk of  Goal: *Absence of Falls  Description: Document Fabiola Marking Fall Risk and appropriate interventions in the flowsheet.   Outcome: Progressing Towards Goal  Note: Fall Risk Interventions:            Medication Interventions: Bed/chair exit alarm, Evaluate medications/consider consulting pharmacy, Patient to call before getting OOB, Teach patient to arise slowly    Elimination Interventions: Bed/chair exit alarm, Call light in reach, Patient to call for help with toileting needs, Stay With Me (per policy)    History of Falls Interventions: Bed/chair exit alarm, Door open when patient unattended, Room close to nurse's station         Problem: Patient Education: Go to Patient Education Activity  Goal: Patient/Family Education  Outcome: Progressing Towards Goal     Problem: Pain  Goal: *Control of Pain  Outcome: Progressing Towards Goal  Goal: *PALLIATIVE CARE:  Alleviation of Pain  Outcome: Progressing Towards Goal

## 2022-09-18 NOTE — PROGRESS NOTES
[address] [facility] Adult  Hospitalist Group                                                                                          Hospitalist Progress Note  Kyle Francisco MD  Answering service: [phone] from in house phone        Date of Service:  2022  NAME:  Guille Dhaliwal  :  1960  MRN:  [id number]      Admission Summary:   Guille Dhaliwal is a 58 y.o. male who presents with fall and ankle fracture. Patient has past medical history of end-stage renal disease on hemodialysis. He said he struck his head he complains of left ankle injury. There was an injury over the medial malleolus . Right now patient complains of severe ankle pain was sedated for left ankle stabilization. Denies any chest pain, shortness of breath, fevers or recent illnesses. No headache. He does receive heparin for dialysis. He was due for dialysis today and typically receives it  and Friday. Last dialysis was Wednesday. He sees Dr. Aleksandra Hein. Nephrology consulted for resuming hemodialysis     Interval history / Subjective:   States he had a rough night with a lot of panic attacks and nightmares, he wonders if it's from all the pain meds he is getting  He denies any h/o panic attacks  Currently NAD though a little anxious/concerned     Assessment & Plan:     Left ankle dislocation, left distal fibula fracture s/p mechanical fall  S/p Closed reduction in ED  Per ortho: Ice/elevate;  If he is physically able, may work on bed to chair transfers with PT; strict NWB LLE  Pain control- d/t c/o panic attacks will hold IV fentanyl, continue PO percocet, add scheduled tylenol- monitor  Plan definitive fixation next week with foot/ankle Dr. Kat  Mild leukocytosis likely reactive 2/2 this- resolved     ESRD on HD MWF  Anemia of CKD  Nephrology following  S/p HD late   Unclear why on lasix 80 bid and gabapentin 600 bid at home (max dose of gabapentin is 300mg tiw after HD)  HOWEVER, the abrupt discontinuation of chronic gabapentin can cause irritability, tachycardia, diaphoresis (ie anxiety), so need to wean this slowly, therefore will resume gabapentin at 300 bid for now     Mechanical fall, \"legs buckled\"  Denies any pre-syncopal symptoms  No indication for syncope workup or imaging  Could check orthostats with PT (though NWB LLE)  CT head no acute abnormality  Carotid Doppler w/o b/l ICA stenosis, VAs patent, incidental large left thyroid nodule - f/up PCP for thyroid US     HTN  Cont home ARB  IV PRN    DM2, a1c 12.8, uncontrolled  SSI, Lantus, Humalog    H/o hypothyroidism  TSH low at 0.25  A hyperthyroid state can also contribute to the anxiety above  Interestingly, pt admits to taking his 25mcg LT4 only every other day or every 3 days, whenever he remembers  So may not need LT4 at all? Will hold for now  F/up with PCP for recheck TSH    Hyponatremia, likely SIADH from ankle fracture, stress, pain, anxiety  1500 [address]  Monitor      Code status: Full Code  Prophylaxis: Heparin ppx  Care Plan discussed with: Patient/Family  Anticipated Disposition: Home  Anticipated Discharge: Greater than 48 hours     Hospital Problems  Never Reviewed            Codes Class Noted POA    Bimalleolar fracture of left ankle ICD-10-CM: B47.675O  ICD-9-CM: 824.4  9/17/2022 Unknown        Syncope ICD-10-CM: R55  ICD-9-CM: 780.2  9/16/2022 Unknown         Review of Systems:   Pertinent items are noted in HPI    Vital Signs:    Last 24hrs VS reviewed since prior progress note.  Most recent are:  Visit Vitals  BP (!) 123/51 (BP 1 Location: Right arm, BP Patient Position: At rest)   Pulse 88   Temp 98.2 °F (36.8 °C)   Resp 14   Ht 6' (1.829 m)   Wt 140.8 kg (310 lb 6.4 oz)   SpO2 92%   BMI 42.10 kg/m²         Intake/Output Summary (Last 24 hours) at 9/18/2022 1207  Last data filed at 9/18/2022 0214  Gross per 24 hour   Intake --   Output 420 ml   Net -420 ml          Physical Examination:   I had a face to face encounter with this patient and independently examined them on 9/18/2022 as outlined below:    General: Alert, cooperative, no acute distress    EENT:  EOMI. Anicteric sclerae. MMM  Resp:  CTA bilaterally, no wheezing or rales. No accessory muscle use  CV:  RRR, No audible murmur  GI:  Soft, Non distended, Non tender  Neurologic:  Alert and oriented, normal speech, ENAMORADO  Extremities: LLE wrapped, RLE no edema or cyanosis  Psych:   Mildly anxious, not agitated  Skin:  No rashes. No jaundice       Data Review:   I personally reviewed: vitals, labs, imaging results, notes    Labs:     Recent Labs     09/18/22 0123 09/16/22 0952   WBC 8.4 11.3*   HGB 11.3* 12.9   HCT 31.8* 36.5*    181       Recent Labs     09/18/22 0123 09/16/22 0952   * 132*   K 4.2 4.1   CL 94* 97   CO2 26 27   BUN 51* 60*   CREA 5.42* 5.29*   * 86   CA 9.5 9.9   MG 2.0  --    PHOS 7.1*  --        Recent Labs     09/16/22 0952   ALT 17   *   TBILI 0.3   TP 7.6   ALB 3.4*   GLOB 4.2*       No results for input(s): INR, PTP, APTT, INREXT, INREXT in the last 72 hours. No results for input(s): FE, TIBC, PSAT, FERR in the last 72 hours. No results found for: FOL, RBCF   No results for input(s): PH, PCO2, PO2 in the last 72 hours. No results for input(s): CPK, CKNDX, TROIQ in the last 72 hours.     No lab exists for component: CPKMB  Lab Results   Component Value Date/Time    Cholesterol, total 142 11/30/2011 09:00 AM    HDL Cholesterol 22 11/30/2011 09:00 AM    LDL, calculated 53.2 11/30/2011 09:00 AM    Triglyceride 334 (H) 11/30/2011 09:00 AM    CHOL/HDL Ratio 6.5 (H) 11/30/2011 09:00 AM     Lab Results   Component Value Date/Time    Glucose (POC) 348 (H) 09/18/2022 11:20 AM    Glucose (POC) 269 (H) 09/18/2022 08:22 AM    Glucose (POC) 325 (H) 09/17/2022 10:02 PM    Glucose (POC) 348 (H) 09/17/2022 04:39 PM    Glucose (POC) 321 (H) 09/17/2022 11:45 AM     Lab Results   Component Value Date/Time    Color YELLOW/STRAW 09/16/2022 09:00 PM Appearance CLEAR 09/16/2022 09:00 PM    Specific gravity 1.017 09/16/2022 09:00 PM    pH (UA) 5.5 09/16/2022 09:00 PM    Protein >300 (A) 09/16/2022 09:00 PM    Glucose 250 (A) 09/16/2022 09:00 PM    Ketone Negative 09/16/2022 09:00 PM    Bilirubin Negative 09/16/2022 09:00 PM    Urobilinogen 0.2 09/16/2022 09:00 PM    Nitrites Negative 09/16/2022 09:00 PM    Leukocyte Esterase Negative 09/16/2022 09:00 PM    Epithelial cells FEW 09/16/2022 09:00 PM    Bacteria Negative 09/16/2022 09:00 PM    WBC 0-4 09/16/2022 09:00 PM    RBC 5-10 09/16/2022 09:00 PM       Medications Reviewed:     Current Facility-Administered Medications   Medication Dose Route Frequency    senna-docusate (PERICOLACE) 8.6-50 mg per tablet 1 Tablet  1 Tablet Oral DAILY    acetaminophen (TYLENOL) tablet 1,000 mg  1,000 mg Oral BID    oxyCODONE IR (ROXICODONE) tablet 5-10 mg  5-10 mg Oral Q4H PRN    insulin lispro (HUMALOG) injection   SubCUTAneous AC&HS    glucose chewable tablet 16 g  4 Tablet Oral PRN    glucagon (GLUCAGEN) injection 1 mg  1 mg IntraMUSCular PRN    dextrose 10 % infusion 0-250 mL  0-250 mL IntraVENous PRN    hydrALAZINE (APRESOLINE) 20 mg/mL injection 20 mg  20 mg IntraVENous Q4H PRN    losartan (COZAAR) tablet 100 mg  100 mg Oral DAILY    insulin glargine (LANTUS) injection 20 Units  20 Units SubCUTAneous QHS    albumin human 25% (BUMINATE) solution 25 g  25 g IntraVENous DIALYSIS PRN    sodium chloride (NS) flush 5-40 mL  5-40 mL IntraVENous Q8H    sodium chloride (NS) flush 5-40 mL  5-40 mL IntraVENous PRN    polyethylene glycol (MIRALAX) packet 17 g  17 g Oral DAILY PRN    ondansetron (ZOFRAN ODT) tablet 4 mg  4 mg Oral Q8H PRN    Or    ondansetron (ZOFRAN) injection 4 mg  4 mg IntraVENous Q6H PRN    heparin (porcine) injection 5,000 Units  5,000 Units SubCUTAneous Q8H    acetaminophen (TYLENOL) tablet 650 mg  650 mg Oral Q4H PRN    Or    acetaminophen (TYLENOL) suppository 650 mg  650 mg Rectal Q6H PRN    [Held by provider] fentaNYL citrate (PF) injection 25 mcg  25 mcg IntraVENous Q4H PRN    heparin (porcine) 1,000 unit/mL injection 2,200 Units  2,200 Units InterCATHeter DIALYSIS PRN    And    heparin (porcine) 1,000 unit/mL injection 2,100 Units  2,100 Units InterCATHeter DIALYSIS PRN     ______________________________________________________________________  EXPECTED LENGTH OF STAY: - - -  ACTUAL LENGTH OF STAY:          1                 Janine Lagunas MD

## 2022-09-19 LAB
ALBUMIN SERPL-MCNC: 2.8 G/DL (ref 3.5–5)
ANION GAP SERPL CALC-SCNC: 12 MMOL/L (ref 5–15)
BUN SERPL-MCNC: 69 MG/DL (ref 6–20)
BUN/CREAT SERPL: 10 (ref 12–20)
CALCIUM SERPL-MCNC: 9.8 MG/DL (ref 8.5–10.1)
CHLORIDE SERPL-SCNC: 94 MMOL/L (ref 97–108)
CO2 SERPL-SCNC: 24 MMOL/L (ref 21–32)
CREAT SERPL-MCNC: 6.98 MG/DL (ref 0.7–1.3)
GLUCOSE BLD STRIP.AUTO-MCNC: 180 MG/DL (ref 65–117)
GLUCOSE BLD STRIP.AUTO-MCNC: 231 MG/DL (ref 65–117)
GLUCOSE BLD STRIP.AUTO-MCNC: 234 MG/DL (ref 65–117)
GLUCOSE SERPL-MCNC: 234 MG/DL (ref 65–100)
PHOSPHATE SERPL-MCNC: 8.9 MG/DL (ref 2.6–4.7)
POTASSIUM SERPL-SCNC: 4.2 MMOL/L (ref 3.5–5.1)
SERVICE CMNT-IMP: ABNORMAL
SODIUM SERPL-SCNC: 130 MMOL/L (ref 136–145)

## 2022-09-19 PROCEDURE — 65270000029 HC RM PRIVATE

## 2022-09-19 PROCEDURE — 90935 HEMODIALYSIS ONE EVALUATION: CPT

## 2022-09-19 PROCEDURE — 74011250636 HC RX REV CODE- 250/636: Performed by: INTERNAL MEDICINE

## 2022-09-19 PROCEDURE — 80069 RENAL FUNCTION PANEL: CPT

## 2022-09-19 PROCEDURE — 77010033678 HC OXYGEN DAILY

## 2022-09-19 PROCEDURE — 36415 COLL VENOUS BLD VENIPUNCTURE: CPT

## 2022-09-19 PROCEDURE — 74011250637 HC RX REV CODE- 250/637: Performed by: PHYSICIAN ASSISTANT

## 2022-09-19 PROCEDURE — 74011636637 HC RX REV CODE- 636/637: Performed by: INTERNAL MEDICINE

## 2022-09-19 PROCEDURE — 74011000250 HC RX REV CODE- 250: Performed by: HOSPITALIST

## 2022-09-19 PROCEDURE — 82962 GLUCOSE BLOOD TEST: CPT

## 2022-09-19 PROCEDURE — 74011250637 HC RX REV CODE- 250/637: Performed by: INTERNAL MEDICINE

## 2022-09-19 PROCEDURE — 74011250636 HC RX REV CODE- 250/636: Performed by: HOSPITALIST

## 2022-09-19 RX ORDER — OXYCODONE HYDROCHLORIDE 5 MG/1
5-10 TABLET ORAL
Status: DISCONTINUED | OUTPATIENT
Start: 2022-09-19 | End: 2022-09-21 | Stop reason: SDUPTHER

## 2022-09-19 RX ORDER — INSULIN GLARGINE 100 [IU]/ML
40 INJECTION, SOLUTION SUBCUTANEOUS
Status: DISCONTINUED | OUTPATIENT
Start: 2022-09-19 | End: 2022-10-03 | Stop reason: HOSPADM

## 2022-09-19 RX ORDER — GABAPENTIN 300 MG/1
300 CAPSULE ORAL
Status: COMPLETED | OUTPATIENT
Start: 2022-09-21 | End: 2022-09-24

## 2022-09-19 RX ORDER — GABAPENTIN 300 MG/1
300 CAPSULE ORAL
Status: DISCONTINUED | OUTPATIENT
Start: 2022-09-26 | End: 2022-10-03 | Stop reason: HOSPADM

## 2022-09-19 RX ORDER — GABAPENTIN 300 MG/1
300 CAPSULE ORAL 2 TIMES DAILY
Status: COMPLETED | OUTPATIENT
Start: 2022-09-20 | End: 2022-09-20

## 2022-09-19 RX ADMIN — HEPARIN SODIUM 2200 UNITS: 1000 INJECTION INTRAVENOUS; SUBCUTANEOUS at 13:27

## 2022-09-19 RX ADMIN — SODIUM CHLORIDE, PRESERVATIVE FREE 10 ML: 5 INJECTION INTRAVENOUS at 14:33

## 2022-09-19 RX ADMIN — Medication 10 UNITS: at 16:55

## 2022-09-19 RX ADMIN — HEPARIN SODIUM 5000 UNITS: 5000 INJECTION INTRAVENOUS; SUBCUTANEOUS at 16:54

## 2022-09-19 RX ADMIN — HEPARIN SODIUM 5000 UNITS: 5000 INJECTION INTRAVENOUS; SUBCUTANEOUS at 00:16

## 2022-09-19 RX ADMIN — FENTANYL CITRATE 25 MCG: 50 INJECTION, SOLUTION INTRAMUSCULAR; INTRAVENOUS at 16:56

## 2022-09-19 RX ADMIN — GABAPENTIN 300 MG: 600 TABLET, FILM COATED ORAL at 14:35

## 2022-09-19 RX ADMIN — ACETAMINOPHEN 1000 MG: 500 TABLET, FILM COATED ORAL at 18:14

## 2022-09-19 RX ADMIN — Medication 3 UNITS: at 16:56

## 2022-09-19 RX ADMIN — Medication 3 UNITS: at 09:06

## 2022-09-19 RX ADMIN — INSULIN GLARGINE 40 UNITS: 100 INJECTION, SOLUTION SUBCUTANEOUS at 21:45

## 2022-09-19 RX ADMIN — GABAPENTIN 300 MG: 600 TABLET, FILM COATED ORAL at 18:14

## 2022-09-19 RX ADMIN — ONDANSETRON 4 MG: 2 INJECTION INTRAMUSCULAR; INTRAVENOUS at 11:09

## 2022-09-19 RX ADMIN — Medication 10 UNITS: at 09:06

## 2022-09-19 RX ADMIN — OXYCODONE 5 MG: 5 TABLET ORAL at 09:06

## 2022-09-19 RX ADMIN — SODIUM CHLORIDE, PRESERVATIVE FREE 10 ML: 5 INJECTION INTRAVENOUS at 06:45

## 2022-09-19 RX ADMIN — HEPARIN SODIUM 2100 UNITS: 1000 INJECTION INTRAVENOUS; SUBCUTANEOUS at 13:26

## 2022-09-19 RX ADMIN — OXYCODONE 10 MG: 5 TABLET ORAL at 14:27

## 2022-09-19 NOTE — PROGRESS NOTES
Transition of Care Plan: likely home when medically ready pending medical/therapy recommendations     RUR: 13% low     PCP F/U: Dr. Aileen Rodriguez     Disposition: likely home when medically ready pending medical/therapy recommendations     Transportation: spouse     Dialysis: MWF at 12:30pm [facility], [address] [place] [address]     Main Contact: spouse-Charisse Lafleur-patient unable to provide contact number at this time    [phone]: Therapy unable to work with patient this morning due to HD.  Will continue to follow    Glen Kahn RN, CRM

## 2022-09-19 NOTE — PROGRESS NOTES
[facility]                                                                                          Hospitalist Progress Note  Berta Finn MD  Answering service: [phone] from in house phone        Date of Service:  2022  NAME:  Ernestina Ayers  :  1960  MRN:  [id number]      Admission Summary:   Ernestina Ayers is a 58 y.o. male who presents with fall and ankle fracture. Patient has past medical history of end-stage renal disease on hemodialysis. He said he struck his head he complains of left ankle injury. There was an injury over the medial malleolus . Right now patient complains of severe ankle pain was sedated for left ankle stabilization. Denies any chest pain, shortness of breath, fevers or recent illnesses. No headache. He does receive heparin for dialysis. He was due for dialysis today and typically receives it  and Friday. Last dialysis was Wednesday. He sees Dr. Mara Romero. Nephrology consulted for resuming hemodialysis     Interval history / Subjective:   Slept well  Panic attacks resolved  LLE pain slightly improving, requiring less pain med  NAD     Assessment & Plan:     Left ankle dislocation, left distal fibula fracture s/p mechanical fall  S/p Closed reduction in ED  Per ortho: Ice/elevate;  If he is physically able, may work on bed to chair transfers with PT; strict NWB LLE  Pain control PRN  Plan definitive fixation this week with foot/ankle Dr. Jolly Arias  Mild leukocytosis likely reactive 2/2 this- resolved     ESRD on HD MWF  Anemia of CKD  Nephrology following  Unclear why on lasix 80 bid and gabapentin 600 bid at home (max dose of gabapentin is 300mg tiw after HD)    Panic attacks, anxiety   Likely 2/2 the abrupt discontinuation of chronic gabapentin which can cause irritability, tachycardia, diaphoresis (ie anxiety), therefore resumed gabapentin at 300 bid for now; I have ordered a taper to get to the appropriate regimen in ESRD     Mechanical fall, \"legs buckled\"  Denies any pre-syncopal symptoms  No indication for syncope workup or imaging  Could check orthostats with PT (though NWB LLE)  CT head no acute abnormality  Carotid Doppler w/o b/l ICA stenosis, VAs patent, incidental large left thyroid nodule - f/up PCP for thyroid US     HTN  Cont home ARB  IV PRN    DM2, a1c 12.8, uncontrolled  SSI, Lantus, Humalog- cont to adjust PRN    H/o hypothyroidism  TSH low at 0.25  Interestingly, pt admits to taking his 25mcg LT4 only every other day or every 3 days, whenever he remembers  So may not need LT4 at all? Will hold for now  F/up with PCP for recheck TSH    Hyponatremia  Likely SIADH from ankle fracture, stress, pain, anxiety  1500 FR  Monitor      Code status: Full Code  Prophylaxis: Heparin ppx  Care Plan discussed with: Patient/Family, Nurse, and   Anticipated Disposition: Home  Anticipated Discharge: Greater than 48 hours     Hospital Problems  Never Reviewed            Codes Class Noted POA    Bimalleolar fracture of left ankle ICD-10-CM: K77.437X  ICD-9-CM: 824.4  9/17/2022 Unknown        Syncope ICD-10-CM: R55  ICD-9-CM: 780.2  9/16/2022 Unknown         Review of Systems:   Pertinent items are noted in HPI    Vital Signs:    Last 24hrs VS reviewed since prior progress note. Most recent are:  Visit Vitals  /66   Pulse 93   Temp 98.4 °F (36.9 °C)   Resp 18   Ht 6' (1.829 m)   Wt 140.8 kg (310 lb 6.4 oz)   SpO2 91%   BMI 42.10 kg/m²         Intake/Output Summary (Last 24 hours) at 9/19/2022 1301  Last data filed at 9/18/2022 1833  Gross per 24 hour   Intake --   Output 400 ml   Net -400 ml          Physical Examination:   I had a face to face encounter with this patient and independently examined them on 9/19/2022 as outlined below:    General: Alert, cooperative, no acute distress    EENT:  EOMI. Anicteric sclerae. MMM  Resp:  CTA bilaterally, no wheezing or rales.  No accessory muscle use  CV:  RRR, No audible murmur  GI:  Soft, Non distended, Non tender  Neurologic:  Alert and oriented, normal speech, [place]  Extremities: LLE wrapped, RLE no edema or cyanosis  Psych:   Not anxious, not agitated  Skin:  No rashes. No jaundice       Data Review:   I personally reviewed: vitals, labs, imaging results, notes    Labs:     Recent Labs     09/18/22  0123   WBC 8.4   HGB 11.3*   HCT 31.8*          Recent Labs     09/19/22 0322 09/18/22 0123   * 129*   K 4.2 4.2   CL 94* 94*   CO2 24 26   BUN 69* 51*   CREA 6.98* 5.42*   * 245*   CA 9.8 9.5   MG  --  2.0   PHOS 8.9* 7.1*       Recent Labs     09/19/22 0322   ALB 2.8*       No results for input(s): INR, PTP, APTT, INREXT, INREXT in the last 72 hours. No results for input(s): FE, TIBC, PSAT, FERR in the last 72 hours. No results found for: FOL, RBCF   No results for input(s): PH, PCO2, PO2 in the last 72 hours. No results for input(s): CPK, CKNDX, TROIQ in the last 72 hours.     No lab exists for component: CPKMB  Lab Results   Component Value Date/Time    Cholesterol, total 142 11/30/2011 09:00 AM    HDL Cholesterol 22 11/30/2011 09:00 AM    LDL, calculated 53.2 11/30/2011 09:00 AM    Triglyceride 334 (H) 11/30/2011 09:00 AM    CHOL/HDL Ratio 6.5 (H) 11/30/2011 09:00 AM     Lab Results   Component Value Date/Time    Glucose (POC) 234 (H) 09/19/2022 05:53 AM    Glucose (POC) 279 (H) 09/18/2022 09:53 PM    Glucose (POC) 249 (H) 09/18/2022 04:09 PM    Glucose (POC) 348 (H) 09/18/2022 11:20 AM    Glucose (POC) 269 (H) 09/18/2022 08:22 AM     Lab Results   Component Value Date/Time    Color YELLOW/STRAW 09/16/2022 09:00 PM    Appearance CLEAR 09/16/2022 09:00 PM    Specific gravity 1.017 09/16/2022 09:00 PM    pH (UA) 5.5 09/16/2022 09:00 PM    Protein >300 (A) 09/16/2022 09:00 PM    Glucose 250 (A) 09/16/2022 09:00 PM    Ketone Negative 09/16/2022 09:00 PM    Bilirubin Negative 09/16/2022 09:00 PM    Urobilinogen 0.2 09/16/2022 09:00 PM    Nitrites Negative 09/16/2022 09:00 PM    Leukocyte Esterase Negative 09/16/2022 09:00 PM    Epithelial cells FEW 09/16/2022 09:00 PM    Bacteria Negative 09/16/2022 09:00 PM    WBC 0-4 09/16/2022 09:00 PM    RBC 5-10 09/16/2022 09:00 PM       Medications Reviewed:     Current Facility-Administered Medications   Medication Dose Route Frequency    senna-docusate (PERICOLACE) 8.6-50 mg per tablet 1 Tablet  1 Tablet Oral DAILY    acetaminophen (TYLENOL) tablet 1,000 mg  1,000 mg Oral BID    insulin glargine (LANTUS) injection 30 Units  30 Units SubCUTAneous QHS    insulin lispro (HUMALOG) injection 10 Units  10 Units SubCUTAneous TIDAC    gabapentin (NEURONTIN) tablet 300 mg  300 mg Oral BID    oxyCODONE IR (ROXICODONE) tablet 5-10 mg  5-10 mg Oral Q4H PRN    insulin lispro (HUMALOG) injection   SubCUTAneous AC&HS    glucose chewable tablet 16 g  4 Tablet Oral PRN    glucagon (GLUCAGEN) injection 1 mg  1 mg IntraMUSCular PRN    dextrose 10 % infusion 0-250 mL  0-250 mL IntraVENous PRN    hydrALAZINE (APRESOLINE) 20 mg/mL injection 20 mg  20 mg IntraVENous Q4H PRN    losartan (COZAAR) tablet 100 mg  100 mg Oral DAILY    albumin human 25% (BUMINATE) solution 25 g  25 g IntraVENous DIALYSIS PRN    sodium chloride (NS) flush 5-40 mL  5-40 mL IntraVENous Q8H    sodium chloride (NS) flush 5-40 mL  5-40 mL IntraVENous PRN    polyethylene glycol (MIRALAX) packet 17 g  17 g Oral DAILY PRN    ondansetron (ZOFRAN ODT) tablet 4 mg  4 mg Oral Q8H PRN    Or    ondansetron (ZOFRAN) injection 4 mg  4 mg IntraVENous Q6H PRN    heparin (porcine) injection 5,000 Units  5,000 Units SubCUTAneous Q8H    acetaminophen (TYLENOL) tablet 650 mg  650 mg Oral Q4H PRN    Or    acetaminophen (TYLENOL) suppository 650 mg  650 mg Rectal Q6H PRN    [Held by provider] fentaNYL citrate (PF) injection 25 mcg  25 mcg IntraVENous Q4H PRN    heparin (porcine) 1,000 unit/mL injection 2,200 Units  2,200 Units InterCATHeter DIALYSIS PRN    And    heparin (porcine) 1,000 unit/mL injection 2,100 Units  2,100 Units InterCATHeter DIALYSIS PRN     ______________________________________________________________________  EXPECTED LENGTH OF STAY: - - -  ACTUAL LENGTH OF STAY:          2                 Edy Mathias MD

## 2022-09-19 NOTE — PROGRESS NOTES
Physical Therapy    Chart reviewed in preparation for PT evaluation. Note LLE NWB due to ankle fracture and patient is anticipating ORIF tomorrow. Attempted evaluation. Patient received in severe pain with LLE hanging in dependent position off side of bed. Patient could hardly tolerate bed mobility, scooting to [facility] with x3 assist, and refused additional activity at this time d/t severe pain. RN present and aware. PT evaluation aborted.     Vidya Graf, PT, DPT

## 2022-09-19 NOTE — PROGRESS NOTES
Problem: Falls - Risk of  Goal: *Absence of Falls  Description: Document Juanito Waterman Fall Risk and appropriate interventions in the flowsheet. Outcome: Progressing Towards Goal  Note: Fall Risk Interventions:  Mobility Interventions: Bed/chair exit alarm, Patient to call before getting OOB, Strengthening exercises (ROM-active/passive)         Medication Interventions: Patient to call before getting OOB, Teach patient to arise slowly, Bed/chair exit alarm    Elimination Interventions: Call light in reach, Stay With Me (per policy)    History of Falls Interventions: Bed/chair exit alarm, Investigate reason for fall, Room close to nurse's station         Problem: Patient Education: Go to Patient Education Activity  Goal: Patient/Family Education  Outcome: Progressing Towards Goal     Problem: Pain  Goal: *Control of Pain  Outcome: Progressing Towards Goal  Goal: *PALLIATIVE CARE:  Alleviation of Pain  Outcome: Progressing Towards Goal     Problem: Patient Education: Go to Patient Education Activity  Goal: Patient/Family Education  Outcome: Progressing Towards Goal     Problem: Pressure Injury - Risk of  Goal: *Prevention of pressure injury  Description: Document See Scale and appropriate interventions in the flowsheet. Outcome: Progressing Towards Goal  Note: Pressure Injury Interventions: Activity Interventions: Pressure redistribution bed/mattress(bed type), PT/OT evaluation    Mobility Interventions: PT/OT evaluation, Turn and reposition approx.  every two hours(pillow and wedges)    Nutrition Interventions: Offer support with meals,snacks and hydration, Document food/fluid/supplement intake    Friction and Shear Interventions: Lift sheet, Apply protective barrier, creams and emollients, Minimize layers                Problem: Patient Education: Go to Patient Education Activity  Goal: Patient/Family Education  Outcome: Progressing Towards Goal     Problem: Diabetes Self-Management  Goal: *Disease process and treatment process  Description: Define diabetes and identify own type of diabetes; list 3 options for treating diabetes. Outcome: Progressing Towards Goal  Goal: *Incorporating nutritional management into lifestyle  Description: Describe effect of type, amount and timing of food on blood glucose; list 3 methods for planning meals. Outcome: Progressing Towards Goal  Goal: *Incorporating physical activity into lifestyle  Description: State effect of exercise on blood glucose levels. Outcome: Progressing Towards Goal  Goal: *Developing strategies to promote health/change behavior  Description: Define the ABC's of diabetes; identify appropriate screenings, schedule and personal plan for screenings. Outcome: Progressing Towards Goal  Goal: *Using medications safely  Description: State effect of diabetes medications on diabetes; name diabetes medication taking, action and side effects. Outcome: Progressing Towards Goal  Goal: *Monitoring blood glucose, interpreting and using results  Description: Identify recommended blood glucose targets  and personal targets. Outcome: Progressing Towards Goal  Goal: *Prevention, detection, treatment of acute complications  Description: List symptoms of hyper- and hypoglycemia; describe how to treat low blood sugar and actions for lowering  high blood glucose level. Outcome: Progressing Towards Goal  Goal: *Prevention, detection and treatment of chronic complications  Description: Define the natural course of diabetes and describe the relationship of blood glucose levels to long term complications of diabetes.   Outcome: Progressing Towards Goal  Goal: *Developing strategies to address psychosocial issues  Description: Describe feelings about living with diabetes; identify support needed and support network  Outcome: Progressing Towards Goal  Goal: *Insulin pump training  Outcome: Progressing Towards Goal  Goal: *Sick day guidelines  Outcome: Progressing Towards Goal  Goal: *Patient Specific Goal (EDIT GOAL, INSERT TEXT)  Outcome: Progressing Towards Goal     Problem: Patient Education: Go to Patient Education Activity  Goal: Patient/Family Education  Outcome: Progressing Towards Goal     Problem: Discharge Planning  Goal: *Discharge to safe environment  Outcome: Progressing Towards Goal  Goal: *Knowledge of medication management  Outcome: Progressing Towards Goal  Goal: *Knowledge of discharge instructions  Outcome: Progressing Towards Goal     Problem: Patient Education: Go to Patient Education Activity  Goal: Patient/Family Education  Outcome: Progressing Towards Goal

## 2022-09-19 NOTE — PROGRESS NOTES
Nephrology Progress Note  Anitha Tejada  Date of Admission : 9/16/2022    CC:  Follow up for ESRD       Assessment and Plan     [facility]  - HD now  - UF as able    Volume overload:  - UF 3-4kg as tolerated    HTN:  - resume home meds    Anemia of CKD:  - hold ADONIS    Secondary HPT    L distal fibula fracture:  - per ortho    DM2  Obesity  Noncompliance       Interval History:  Seen and examined on dialysis. BP holding stbale. No cp, sob, n/v/d    Current Medications: all current  Medications have been eviewed in EPIC  Review of Systems: Pertinent items are noted in HPI. Objective:  Vitals:    Vitals:    09/19/22 0400 09/19/22 0549 09/19/22 0600 09/19/22 1000   BP:  (!) 158/56     Pulse: 79 81 87 90   Resp:  18     Temp:  98.4 °F (36.9 °C)     SpO2:  91%     Weight:       Height:         Intake and Output:  No intake/output data recorded. 09/17 1901 - 09/19 0700  In: -   Out: 36 [Urine:820]    Physical Examination:    General: NAD,Conversant   Neck:  Supple, no mass  Resp:  Lungs CTA B/L, no wheezing , normal respiratory effort  CV:  RRR,  no murmur or rub, 2+ LE edema  GI:  Soft, NT, + Bowel sounds, no hepatosplenomegaly  Neurologic:  Non focal  Psych:             AAO x 3 appropriate affect  Skin:  No Rash  Access:           RIJ PC c/d/i    []    High complexity decision making was performed  []    Patient is at high-risk of decompensation with multiple organ involvement    Lab Data Personally Reviewed: I have reviewed all the pertinent labs, microbiology data and radiology studies during assessment.     Recent Labs     09/19/22  0322 09/18/22  0123   * 129*   K 4.2 4.2   CL 94* 94*   CO2 24 26   * 245*   BUN 69* 51*   CREA 6.98* 5.42*   CA 9.8 9.5   MG  --  2.0   PHOS 8.9* 7.1*   ALB 2.8*  --        Recent Labs     09/18/22  0123   WBC 8.4   HGB 11.3*   HCT 31.8*          No results found for: SDES  No results found for: CULT  Recent Results (from the past 24 hour(s))   GLUCOSE, POC    Collection Time: 09/18/22 11:20 AM   Result Value Ref Range    Glucose (POC) 348 (H) 65 - 117 mg/dL    Performed by Yg Cruz    GLUCOSE, POC    Collection Time: 09/18/22  4:09 PM   Result Value Ref Range    Glucose (POC) 249 (H) 65 - 117 mg/dL    Performed by [place], POC    Collection Time: 09/18/22  9:53 PM   Result Value Ref Range    Glucose (POC) 279 (H) 65 - 117 mg/dL    Performed by Cecelia Moses    RENAL FUNCTION PANEL    Collection Time: 09/19/22  3:22 AM   Result Value Ref Range    Sodium 130 (L) 136 - 145 mmol/L    Potassium 4.2 3.5 - 5.1 mmol/L    Chloride 94 (L) 97 - 108 mmol/L    CO2 24 21 - 32 mmol/L    Anion gap 12 5 - 15 mmol/L    Glucose 234 (H) 65 - 100 mg/dL    BUN 69 (H) 6 - 20 MG/DL    Creatinine 6.98 (H) 0.70 - 1.30 MG/DL    BUN/Creatinine ratio 10 (L) 12 - 20      GFR est AA 10 (L) >60 ml/min/1.73m2    GFR est non-AA 8 (L) >60 ml/min/1.73m2    Calcium 9.8 8.5 - 10.1 MG/DL    Phosphorus 8.9 (H) 2.6 - 4.7 MG/DL    Albumin 2.8 (L) 3.5 - 5.0 g/dL   GLUCOSE, POC    Collection Time: 09/19/22  5:53 AM   Result Value Ref Range    Glucose (POC) 234 (H) 65 - 117 mg/dL    Performed by Tammy Elizondo MD  [address]  Phone - [phone]   Fax - [phone]  [email or web address]

## 2022-09-19 NOTE — PROGRESS NOTES
Occupational Therapy: abort    Chart reviewed in preparation for OT evaluation. Note LLE NWB due to ankle fracture and patient is anticipating ORIF tomorrow. Attempted evaluation. Patient received in severe pain with LLE hanging in dependent position off side of bed. Patient could hardly tolerate bed mobility, scooting to [facility] with x3 assist, and refused additional activity at this time d/t severe pain. RN present and aware. OT evaluation aborted.     Demetri Lyons OTR/L

## 2022-09-19 NOTE — PROCEDURES
Hemodialysis / [phone]    Vitals Pre Post Assessment Pre Post   BP BP: (!) 143/76 (09/19/22 0920)   108/53 LOC A&Ox4, cooperative, follows commands  A&Ox4, cooperative, follows commands    HR Pulse (Heart Rate): 90 (09/19/22 1000) 97 Lungs Diminished bilaterally, denies shortness of breath Diminished bilaterally, denies shortness of breath   Resp Resp Rate: 18 (09/19/22 0920) 18 Cardiac Regular, S1, S2 Regular, S1, S2   Temp Temp: 98.4 °F (36.9 °C) (09/19/22 0920) 98.4 Skin R IJ permcath R IJ permcath   Weight    Edema Trace Bilateral lower extremities   Trace Bilateral lower extremities    Tele status NSR NSR Pain 6/10 L ankle 10/10 L ankle (informed primary RN)     Orders   Duration: Start: 0920 End: 1321 Total: 4 hours   Dialyzer: Dialyzer/Set Up Inspection: Revaclear (09/19/22 0920)   K Bath: Dialysate K (mEq/L): 2 (09/19/22 0920)   Ca Bath: Dialysate CA (mEq/L): 2.5 (09/19/22 0920)   Na: Dialysate NA (mEq/L): 138 (09/19/22 0920)   Bicarb: Dialysate HCO3 (mEq/L): 35 (09/19/22 0920)   Target Fluid Removal: Goal/Amount of Fluid to Remove (mL): 4000 mL (09/19/22 0920)     Access   Type & Location: R PC : Dressing CDI. No s/s of infection. Dressing dated 09/16/22. Both lumens aspirate & flush well. Running well at .         Comments:                                        Labs   HBsAg (Antigen) / date: 09/16/22 Negative                                           HBsAb (Antibody) / date: 09/16/22 IMMUNE   Source:    Obtained/Reviewed  Critical Results Called HGB   Date Value Ref Range Status   09/18/2022 11.3 (L) 12.1 - 17.0 g/dL Final     Potassium   Date Value Ref Range Status   09/19/2022 4.2 3.5 - 5.1 mmol/L Final     Calcium   Date Value Ref Range Status   09/19/2022 9.8 8.5 - 10.1 MG/DL Final     BUN   Date Value Ref Range Status   09/19/2022 69 (H) 6 - 20 MG/DL Final     Creatinine   Date Value Ref Range Status   09/19/2022 6.98 (H) 0.70 - 1.30 MG/DL Final        Meds Given   Name Dose Route Heparin 1:1000  2200 Units venous  2100 Units arterial  IV hep lock                Adequacy / Fluid    Total Liters Process: 86.2 L   Net Fluid Removed: 4000 mL      Comments   Time Out Done:   (Time) @ 0900   Admitting Diagnosis: Syncope   Consent obtained/signed: Informed Consent Verified: Yes (09/19/22 0920)   Poly Kuhn / Paulo Steven # Machine Number: [id number] (09/19/22 0920)   Primary Nurse Rpt Pre: Nuvia Aldana RN   Primary Nurse Rpt Post: Nuvia Aldana RN   Pt Education: Ordered Dialysis Treatment    Care Plan: CKD   Pts outpatient clinic: MWF at [facility]     Tx Summary   Comments:              SBAR received from Primary RN. Pt arrived to HD suite A&Ox4. Chronic Consent signed & on file at pt dialysis clinic. Reviewed ordered treatment with patient, pt in agreement. 0920: Each catheter limb disinfected per p&p, caps removed, hubs disinfected per p&p. Each lumen aspirated for blood return and flushed with Normal Saline per policy. VSS. Dialysis Tx initiated. 0930: Lines visible and secure. 1030: VSS, pt resting quietly, lines visible and secure. 1109: Pt complaining of nausea, VSS, Zofran given per [facility]   1145: Pt states \"nausea feels better\", VSS, lines patent and intact. 1230: Pt resting quietly, VSS, lines patent and intact. 1321: Tx ended. VSS. Each dialysis catheter limb disinfected per p&p, all possible blood returned per p&p, and each dialysis hub disinfected per p&p. Each lumen flushed, post dialysis catheter Heparin dwell instilled per order, and caps applied. Bed locked and in the lowest position, call bell and belongings in reach. SBAR given to Primary, RN. Patient is stable at time of my departure. All Dialysis related medications have been reviewed.

## 2022-09-19 NOTE — PROGRESS NOTES
Occupational Therapy    Order acknowledged, chart reviewed in preparation for OT evaluation. Patient is off the floor for HD. Will defer OT at this time and will follow-up later as able and appropriate.      Amy Gomez, OTR/L

## 2022-09-19 NOTE — PROGRESS NOTES
Physical Therapy  PT consult received and chart reviewed. Patient currently off the floor. Will follow up at later time.   Vidya Graf, PT, DPT

## 2022-09-19 NOTE — PROGRESS NOTES
ORTHO FRACTURE PROGRESS NOTE    2022  Admit Date:   2022    Post Op day: * No surgery date entered *    Subjective:    Cherelle Quinones is seen following hemodialysis. Patient expressing that he has significant pain in his left ankle. States that he lost his footing while walking with his walker the other night and tripped sustaining his injury. He states that he uses a walker at all times at baseline.   Denies nausea, vomiting, shortness of breath or chest pains    PT/OT:   Gait:                    Vital Signs:    Patient Vitals for the past 8 hrs:   BP Temp Pulse Resp SpO2   22 1438 (!) 114/55 98.1 °F (36.7 °C) 90 25 94 %   22 1400 -- -- 84 -- --   22 1330 (!) 108/53 98.4 °F (36.9 °C) 97 18 --   22 1315 112/80 -- 90 18 --   22 1300 (!) 114/57 -- 92 18 --   22 1245 126/66 -- 92 18 --   22 1230 135/65 -- 92 18 --   22 1215 109/66 -- 93 18 --   22 1201 -- -- 86 -- --   22 1200 135/77 -- 88 18 --   22 1145 (!) 141/66 -- 89 18 --   22 1130 (!) 146/71 -- 88 18 --   22 1115 105/60 -- 89 18 --   22 1100 131/65 -- 87 18 --   22 1045 135/68 -- 87 18 --   22 1030 134/67 -- 87 18 --   22 1015 (!) 140/68 -- 87 18 --   22 1000 136/60 -- 90 18 --   22 0945 (!) 142/67 -- 88 18 --   22 0930 (!) 167/70 -- 86 19 --   22 0920 (!) 143/76 98.4 °F (36.9 °C) 86 18 --     Temp (24hrs), Av.4 °F (36.9 °C), Min:98.1 °F (36.7 °C), Max:98.9 °F (37.2 °C)      Pain Control:   Pain Assessment  Pain Scale 1: Numeric (0 - 10)  Pain Intensity 1: 10  Pain Location 1: Ankle  Pain Orientation 1: Left  Pain Intervention(s) 1: Medication (see MAR)    Meds:    Current Facility-Administered Medications   Medication Dose Route Frequency    oxyCODONE IR (ROXICODONE) tablet 5-10 mg  5-10 mg Oral Q4H PRN    [START ON 2022] gabapentin (NEURONTIN) capsule 300 mg  300 mg Oral BID    Followed by    Candie Lopez ON 9/21/2022] gabapentin (NEURONTIN) capsule 300 mg  300 mg Oral ACD    Followed by    Luz Maria Older ON 9/26/2022] gabapentin (NEURONTIN) capsule 300 mg  300 mg Oral DIALYSIS MON, WED & FRI    insulin glargine (LANTUS) injection 40 Units  40 Units SubCUTAneous QHS    senna-docusate (PERICOLACE) 8.6-50 mg per tablet 1 Tablet  1 Tablet Oral DAILY    acetaminophen (TYLENOL) tablet 1,000 mg  1,000 mg Oral BID    insulin lispro (HUMALOG) injection 10 Units  10 Units SubCUTAneous TIDAC    gabapentin (NEURONTIN) tablet 300 mg  300 mg Oral BID    insulin lispro (HUMALOG) injection   SubCUTAneous AC&HS    glucose chewable tablet 16 g  4 Tablet Oral PRN    glucagon (GLUCAGEN) injection 1 mg  1 mg IntraMUSCular PRN    dextrose 10 % infusion 0-250 mL  0-250 mL IntraVENous PRN    hydrALAZINE (APRESOLINE) 20 mg/mL injection 20 mg  20 mg IntraVENous Q4H PRN    losartan (COZAAR) tablet 100 mg  100 mg Oral DAILY    albumin human 25% (BUMINATE) solution 25 g  25 g IntraVENous DIALYSIS PRN    sodium chloride (NS) flush 5-40 mL  5-40 mL IntraVENous Q8H    sodium chloride (NS) flush 5-40 mL  5-40 mL IntraVENous PRN    polyethylene glycol (MIRALAX) packet 17 g  17 g Oral DAILY PRN    ondansetron (ZOFRAN ODT) tablet 4 mg  4 mg Oral Q8H PRN    Or    ondansetron (ZOFRAN) injection 4 mg  4 mg IntraVENous Q6H PRN    heparin (porcine) injection 5,000 Units  5,000 Units SubCUTAneous Q8H    acetaminophen (TYLENOL) tablet 650 mg  650 mg Oral Q4H PRN    Or    acetaminophen (TYLENOL) suppository 650 mg  650 mg Rectal Q6H PRN    fentaNYL citrate (PF) injection 25 mcg  25 mcg IntraVENous Q4H PRN    heparin (porcine) 1,000 unit/mL injection 2,200 Units  2,200 Units InterCATHeter DIALYSIS PRN    And    heparin (porcine) 1,000 unit/mL injection 2,100 Units  2,100 Units InterCATHeter DIALYSIS PRN       LAB:    Recent Labs     09/18/22  0123   HCT 31.8*   HGB 11.3*       Transfuse PRBC's:      Assessment & Physician's Comment:  Dressing is clean, dry, and intact  Neurovascular checks within normal limits  Orientation:  Oriented  Patient is awake and alert lying in bed; in significant distress from pain; on agreeable to significant exam  Left lower extremity splint is intact. Moves to her toes to command. Distal cap refill brisk. Active Problems:    Syncope (9/16/2022)      Bimalleolar fracture of left ankle (9/17/2022)        Plan:    Patient needs open reduction of left distal fibular fracture. Discussed with him today. Plan for OR Tuesday afternoon with Dr. Bethanie Bhatt. Consent for open reduction internal fixation of left ankle fracture.   N.p.o. after midnight  Will give IV pain medication option as his oxycodone does not seem to be providing significant relief at this time  Strict elevation of the left lower extremity  Bedrest  Medical management per primary team    Vernon Amaya and Bethanie Bhatt aware of patient and in agreement with current plan    Ayde Daniels [id number]

## 2022-09-19 NOTE — PROGRESS NOTES
TRANSFER - OUT REPORT:    Verbal report given to Claudio Ochoa RN (name) on Bailey Walden  being transferred to  (unit) for routine progression of care       Report consisted of patients Situation, Background, Assessment and   Recommendations(SBAR). Information from the following report(s) SBAR, Kardex, MAR, Cardiac Rhythm NSR, and Dual Neuro Assessment was reviewed with the receiving nurse. Lines:   Double Lumen Permacath Right Subclavian (Active)   Central Line Being Utilized Yes 09/19/22 1130   Criteria for Appropriate Use Dialysis/apheresis 09/19/22 1130   Site Assessment Clean, dry, & intact 09/19/22 1130   Infiltration Assessment 0 09/19/22 0513   Date of Last Dressing Change 09/16/22 09/19/22 1130   Dressing Status Clean, dry, & intact 09/19/22 1130   Dressing Type Bacteriocidal;Tape 09/19/22 1130   Proximal Hub Color/Line Status Blue;Cap end changed; Flushed 09/19/22 1130   Positive Blood Return (Medial Site) Yes 09/19/22 1130   Distal Hub Color/Line Status Red;Cap end changed; Flushed 09/19/22 1130   Positive Blood Return (Lateral Site) Yes 09/19/22 1130   Alcohol Cap Used Yes 09/19/22 1130       Peripheral IV 09/18/22 Anterior;Right Forearm (Active)   Site Assessment Clean, dry, & intact 09/19/22 0513   Phlebitis Assessment 0 09/19/22 0513   Infiltration Assessment 0 09/19/22 0513   Dressing Status Clean, dry, & intact 09/19/22 0513   Dressing Type Transparent 09/19/22 0513   Hub Color/Line Status Blue;Patent 09/19/22 0513   Action Taken Open ports on tubing capped 09/19/22 0513   Alcohol Cap Used Yes 09/19/22 0513        Opportunity for questions and clarification was provided.       Patient transported with:   O2 @ 4 liters

## 2022-09-19 NOTE — PROGRESS NOTES
Physician Progress Note      PATIENT:               Vernon Chino  CSN #:                  [id number]  :                       1960  ADMIT DATE:       2022 9:32 AM  DISCH DATE:  RESPONDING  PROVIDER #:        Vanna Miller MD          QUERY TEXT:    Patient admitted with Left ankle dislocation. Pt noted to have documented uncontrolled diabetes . Please document in progress notes and discharge summary further specificity regarding the control status of DM: The medical record reflects the following:  Risk Factors: DM    Clinical Indicators:    PN   DM2, a1c 12.8, uncontrolled  glu  86   245  234    Treatment:  SSI, Lantus, Humalog- cont to adjust PRN    Thank you,  Jessi Doyle RN [facility]  Clinical Documentation  [phone] or via [address] provided:  -- Hyperglycemia due to DM type II  -- Hyperglycemia not related to DM type II  -- Other - I will add my own diagnosis  -- Disagree - Not applicable / Not valid  -- Disagree - Clinically unable to determine / Unknown  -- Refer to Clinical Documentation Reviewer    PROVIDER RESPONSE TEXT:    This patient has Hyperglycemia due to DM type II.     Query created by: Nidia Henry on 2022 2:45 PM      Electronically signed by:  Vanna Miller MD 2022 4:03 PM

## 2022-09-19 NOTE — PROGRESS NOTES
Problem: Falls - Risk of  Goal: *Absence of Falls  Description: Document Shruthi Gloria Fall Risk and appropriate interventions in the flowsheet. Outcome: Progressing Towards Goal  Note: Fall Risk Interventions:  Mobility Interventions: Communicate number of staff needed for ambulation/transfer, Patient to call before getting OOB, Utilize walker, cane, or other assistive device, Bed/chair exit alarm         Medication Interventions: Bed/chair exit alarm, Teach patient to arise slowly, Patient to call before getting OOB    Elimination Interventions: Bed/chair exit alarm, Call light in reach, Urinal in reach    History of Falls Interventions: Consult care management for discharge planning, Bed/chair exit alarm         Problem: Patient Education: Go to Patient Education Activity  Goal: Patient/Family Education  Outcome: Progressing Towards Goal     Problem: Pain  Goal: *Control of Pain  Outcome: Progressing Towards Goal  Goal: *PALLIATIVE CARE:  Alleviation of Pain  Outcome: Progressing Towards Goal     Problem: Patient Education: Go to Patient Education Activity  Goal: Patient/Family Education  Outcome: Progressing Towards Goal     Problem: Pressure Injury - Risk of  Goal: *Prevention of pressure injury  Description: Document See Scale and appropriate interventions in the flowsheet. Outcome: Progressing Towards Goal  Note: Pressure Injury Interventions:             Activity Interventions: Pressure redistribution bed/mattress(bed type), Increase time out of bed    Mobility Interventions: Assess need for specialty bed, HOB 30 degrees or less, Pressure redistribution bed/mattress (bed type)    Nutrition Interventions: Document food/fluid/supplement intake    Friction and Shear Interventions: Minimize layers                Problem: Patient Education: Go to Patient Education Activity  Goal: Patient/Family Education  Outcome: Progressing Towards Goal     Problem: Diabetes Self-Management  Goal: *Disease process and treatment process  Description: Define diabetes and identify own type of diabetes; list 3 options for treating diabetes. Outcome: Progressing Towards Goal  Goal: *Incorporating nutritional management into lifestyle  Description: Describe effect of type, amount and timing of food on blood glucose; list 3 methods for planning meals. Outcome: Progressing Towards Goal  Goal: *Incorporating physical activity into lifestyle  Description: State effect of exercise on blood glucose levels. Outcome: Progressing Towards Goal  Goal: *Developing strategies to promote health/change behavior  Description: Define the ABC's of diabetes; identify appropriate screenings, schedule and personal plan for screenings. Outcome: Progressing Towards Goal  Goal: *Using medications safely  Description: State effect of diabetes medications on diabetes; name diabetes medication taking, action and side effects. Outcome: Progressing Towards Goal  Goal: *Monitoring blood glucose, interpreting and using results  Description: Identify recommended blood glucose targets  and personal targets. Outcome: Progressing Towards Goal  Goal: *Prevention, detection, treatment of acute complications  Description: List symptoms of hyper- and hypoglycemia; describe how to treat low blood sugar and actions for lowering  high blood glucose level. Outcome: Progressing Towards Goal  Goal: *Prevention, detection and treatment of chronic complications  Description: Define the natural course of diabetes and describe the relationship of blood glucose levels to long term complications of diabetes.   Outcome: Progressing Towards Goal  Goal: *Developing strategies to address psychosocial issues  Description: Describe feelings about living with diabetes; identify support needed and support network  Outcome: Progressing Towards Goal  Goal: *Insulin pump training  Outcome: Progressing Towards Goal  Goal: *Sick day guidelines  Outcome: Progressing Towards Goal  Goal: *Patient Specific Goal (EDIT GOAL, INSERT TEXT)  Outcome: Progressing Towards Goal     Problem: Patient Education: Go to Patient Education Activity  Goal: Patient/Family Education  Outcome: Progressing Towards Goal     Problem: Discharge Planning  Goal: *Discharge to safe environment  Outcome: Progressing Towards Goal  Goal: *Knowledge of medication management  Outcome: Progressing Towards Goal  Goal: *Knowledge of discharge instructions  Outcome: Progressing Towards Goal     Problem: Patient Education: Go to Patient Education Activity  Goal: Patient/Family Education  Outcome: Progressing Towards Goal

## 2022-09-20 ENCOUNTER — APPOINTMENT (OUTPATIENT)
Dept: GENERAL RADIOLOGY | Age: 62
DRG: 492 | End: 2022-09-20
Attending: ORTHOPAEDIC SURGERY
Payer: MEDICARE

## 2022-09-20 ENCOUNTER — ANESTHESIA EVENT (OUTPATIENT)
Dept: SURGERY | Age: 62
DRG: 492 | End: 2022-09-20
Payer: MEDICARE

## 2022-09-20 ENCOUNTER — APPOINTMENT (OUTPATIENT)
Dept: GENERAL RADIOLOGY | Age: 62
DRG: 492 | End: 2022-09-20
Attending: HOSPITALIST
Payer: MEDICARE

## 2022-09-20 ENCOUNTER — ANESTHESIA (OUTPATIENT)
Dept: SURGERY | Age: 62
DRG: 492 | End: 2022-09-20
Payer: MEDICARE

## 2022-09-20 LAB
ALBUMIN SERPL-MCNC: 2.7 G/DL (ref 3.5–5)
ANION GAP SERPL CALC-SCNC: 9 MMOL/L (ref 5–15)
BUN SERPL-MCNC: 41 MG/DL (ref 6–20)
BUN/CREAT SERPL: 8 (ref 12–20)
CALCIUM SERPL-MCNC: 10.2 MG/DL (ref 8.5–10.1)
CHLORIDE SERPL-SCNC: 98 MMOL/L (ref 97–108)
CO2 SERPL-SCNC: 27 MMOL/L (ref 21–32)
CREAT SERPL-MCNC: 4.98 MG/DL (ref 0.7–1.3)
ERYTHROCYTE [DISTWIDTH] IN BLOOD BY AUTOMATED COUNT: 12.5 % (ref 11.5–14.5)
GLUCOSE BLD STRIP.AUTO-MCNC: 130 MG/DL (ref 65–117)
GLUCOSE BLD STRIP.AUTO-MCNC: 155 MG/DL (ref 65–117)
GLUCOSE BLD STRIP.AUTO-MCNC: 179 MG/DL (ref 65–117)
GLUCOSE BLD STRIP.AUTO-MCNC: 212 MG/DL (ref 65–117)
GLUCOSE SERPL-MCNC: 218 MG/DL (ref 65–100)
HCT VFR BLD AUTO: 31.3 % (ref 36.6–50.3)
HGB BLD-MCNC: 10.9 G/DL (ref 12.1–17)
MCH RBC QN AUTO: 31.7 PG (ref 26–34)
MCHC RBC AUTO-ENTMCNC: 34.8 G/DL (ref 30–36.5)
MCV RBC AUTO: 91 FL (ref 80–99)
NRBC # BLD: 0 K/UL (ref 0–0.01)
NRBC BLD-RTO: 0 PER 100 WBC
PHOSPHATE SERPL-MCNC: 7.3 MG/DL (ref 2.6–4.7)
PLATELET # BLD AUTO: 175 K/UL (ref 150–400)
PMV BLD AUTO: 10 FL (ref 8.9–12.9)
POTASSIUM SERPL-SCNC: 4.6 MMOL/L (ref 3.5–5.1)
RBC # BLD AUTO: 3.44 M/UL (ref 4.1–5.7)
SERVICE CMNT-IMP: ABNORMAL
SODIUM SERPL-SCNC: 134 MMOL/L (ref 136–145)
WBC # BLD AUTO: 8.7 K/UL (ref 4.1–11.1)

## 2022-09-20 PROCEDURE — C1713 ANCHOR/SCREW BN/BN,TIS/BN: HCPCS | Performed by: ORTHOPAEDIC SURGERY

## 2022-09-20 PROCEDURE — 99231 SBSQ HOSP IP/OBS SF/LOW 25: CPT | Performed by: ORTHOPAEDIC SURGERY

## 2022-09-20 PROCEDURE — 0SSG0ZZ REPOSITION LEFT ANKLE JOINT, OPEN APPROACH: ICD-10-PCS | Performed by: ORTHOPAEDIC SURGERY

## 2022-09-20 PROCEDURE — 0QSH04Z REPOSITION LEFT TIBIA WITH INTERNAL FIXATION DEVICE, OPEN APPROACH: ICD-10-PCS | Performed by: ORTHOPAEDIC SURGERY

## 2022-09-20 PROCEDURE — 82962 GLUCOSE BLOOD TEST: CPT

## 2022-09-20 PROCEDURE — 77030003915: Performed by: ORTHOPAEDIC SURGERY

## 2022-09-20 PROCEDURE — 77030011992 HC AIRWY NASOPHGL TELE -A: Performed by: ANESTHESIOLOGY

## 2022-09-20 PROCEDURE — 77010033678 HC OXYGEN DAILY

## 2022-09-20 PROCEDURE — 65270000029 HC RM PRIVATE

## 2022-09-20 PROCEDURE — 74011250637 HC RX REV CODE- 250/637: Performed by: INTERNAL MEDICINE

## 2022-09-20 PROCEDURE — 77030040361 HC SLV COMPR DVT MDII -B: Performed by: ORTHOPAEDIC SURGERY

## 2022-09-20 PROCEDURE — 77030008462 HC STPLR SKN PROX J&J -A: Performed by: ORTHOPAEDIC SURGERY

## 2022-09-20 PROCEDURE — 77030026438 HC STYL ET INTUB CARD -A: Performed by: ANESTHESIOLOGY

## 2022-09-20 PROCEDURE — 76210000017 HC OR PH I REC 1.5 TO 2 HR: Performed by: ORTHOPAEDIC SURGERY

## 2022-09-20 PROCEDURE — 77030000032 HC CUF TRNQT ZIMM -B: Performed by: ORTHOPAEDIC SURGERY

## 2022-09-20 PROCEDURE — 77030002916 HC SUT ETHLN J&J -A: Performed by: ORTHOPAEDIC SURGERY

## 2022-09-20 PROCEDURE — 74011250636 HC RX REV CODE- 250/636: Performed by: NURSE ANESTHETIST, CERTIFIED REGISTERED

## 2022-09-20 PROCEDURE — 74011250637 HC RX REV CODE- 250/637: Performed by: NURSE PRACTITIONER

## 2022-09-20 PROCEDURE — 76060000037 HC ANESTHESIA 3 TO 3.5 HR: Performed by: ORTHOPAEDIC SURGERY

## 2022-09-20 PROCEDURE — 85027 COMPLETE CBC AUTOMATED: CPT

## 2022-09-20 PROCEDURE — 74011000250 HC RX REV CODE- 250: Performed by: HOSPITALIST

## 2022-09-20 PROCEDURE — 73610 X-RAY EXAM OF ANKLE: CPT

## 2022-09-20 PROCEDURE — 76010000133 HC OR TIME 3 TO 3.5 HR: Performed by: ORTHOPAEDIC SURGERY

## 2022-09-20 PROCEDURE — 77030008684 HC TU ET CUF COVD -B: Performed by: ANESTHESIOLOGY

## 2022-09-20 PROCEDURE — 80069 RENAL FUNCTION PANEL: CPT

## 2022-09-20 PROCEDURE — 74011250636 HC RX REV CODE- 250/636: Performed by: HOSPITALIST

## 2022-09-20 PROCEDURE — 77030031139 HC SUT VCRL2 J&J -A: Performed by: ORTHOPAEDIC SURGERY

## 2022-09-20 PROCEDURE — 74011250636 HC RX REV CODE- 250/636: Performed by: ANESTHESIOLOGY

## 2022-09-20 PROCEDURE — 77030002933 HC SUT MCRYL J&J -A: Performed by: ORTHOPAEDIC SURGERY

## 2022-09-20 PROCEDURE — 74011000250 HC RX REV CODE- 250: Performed by: NURSE ANESTHETIST, CERTIFIED REGISTERED

## 2022-09-20 PROCEDURE — 36415 COLL VENOUS BLD VENIPUNCTURE: CPT

## 2022-09-20 PROCEDURE — 2709999900 HC NON-CHARGEABLE SUPPLY: Performed by: ORTHOPAEDIC SURGERY

## 2022-09-20 PROCEDURE — 74011636637 HC RX REV CODE- 636/637: Performed by: INTERNAL MEDICINE

## 2022-09-20 PROCEDURE — 0QSK04Z REPOSITION LEFT FIBULA WITH INTERNAL FIXATION DEVICE, OPEN APPROACH: ICD-10-PCS | Performed by: ORTHOPAEDIC SURGERY

## 2022-09-20 PROCEDURE — 94760 N-INVAS EAR/PLS OXIMETRY 1: CPT

## 2022-09-20 PROCEDURE — 77030010210 HC SPLNT P-CUT SAF BSNM -B: Performed by: ORTHOPAEDIC SURGERY

## 2022-09-20 DEVICE — IMPLANTABLE DEVICE: Type: IMPLANTABLE DEVICE | Site: ANKLE | Status: FUNCTIONAL

## 2022-09-20 DEVICE — SCREW BNE L14MM DIA3.5MM CORT DST TIB TI NONCANNULATED: Type: IMPLANTABLE DEVICE | Site: ANKLE | Status: FUNCTIONAL

## 2022-09-20 DEVICE — SCREW BNE L18MM DIA3.5MM CORT DST TIB TI NONCANNULATED: Type: IMPLANTABLE DEVICE | Site: ANKLE | Status: FUNCTIONAL

## 2022-09-20 DEVICE — SCREW BNE L16MM DIA3.5MM CORT DST TIB TI NONCANNULATED: Type: IMPLANTABLE DEVICE | Site: ANKLE | Status: FUNCTIONAL

## 2022-09-20 RX ORDER — FENTANYL CITRATE 50 UG/ML
50 INJECTION, SOLUTION INTRAMUSCULAR; INTRAVENOUS AS NEEDED
Status: DISCONTINUED | OUTPATIENT
Start: 2022-09-20 | End: 2022-09-20 | Stop reason: HOSPADM

## 2022-09-20 RX ORDER — SODIUM CHLORIDE, SODIUM LACTATE, POTASSIUM CHLORIDE, CALCIUM CHLORIDE 600; 310; 30; 20 MG/100ML; MG/100ML; MG/100ML; MG/100ML
75 INJECTION, SOLUTION INTRAVENOUS CONTINUOUS
Status: DISCONTINUED | OUTPATIENT
Start: 2022-09-20 | End: 2022-09-20 | Stop reason: HOSPADM

## 2022-09-20 RX ORDER — CEFAZOLIN SODIUM 1 G/3ML
INJECTION, POWDER, FOR SOLUTION INTRAMUSCULAR; INTRAVENOUS AS NEEDED
Status: DISCONTINUED | OUTPATIENT
Start: 2022-09-20 | End: 2022-09-20 | Stop reason: HOSPADM

## 2022-09-20 RX ORDER — MIDAZOLAM HYDROCHLORIDE 1 MG/ML
1 INJECTION, SOLUTION INTRAMUSCULAR; INTRAVENOUS AS NEEDED
Status: DISCONTINUED | OUTPATIENT
Start: 2022-09-20 | End: 2022-09-20 | Stop reason: HOSPADM

## 2022-09-20 RX ORDER — ONDANSETRON 2 MG/ML
INJECTION INTRAMUSCULAR; INTRAVENOUS AS NEEDED
Status: DISCONTINUED | OUTPATIENT
Start: 2022-09-20 | End: 2022-09-20 | Stop reason: HOSPADM

## 2022-09-20 RX ORDER — SODIUM CHLORIDE 0.9 % (FLUSH) 0.9 %
5-40 SYRINGE (ML) INJECTION EVERY 8 HOURS
Status: DISCONTINUED | OUTPATIENT
Start: 2022-09-20 | End: 2022-09-20 | Stop reason: HOSPADM

## 2022-09-20 RX ORDER — OXYCODONE AND ACETAMINOPHEN 5; 325 MG/1; MG/1
1 TABLET ORAL AS NEEDED
Status: DISCONTINUED | OUTPATIENT
Start: 2022-09-20 | End: 2022-09-20 | Stop reason: HOSPADM

## 2022-09-20 RX ORDER — HYDROMORPHONE HYDROCHLORIDE 1 MG/ML
INJECTION, SOLUTION INTRAMUSCULAR; INTRAVENOUS; SUBCUTANEOUS AS NEEDED
Status: DISCONTINUED | OUTPATIENT
Start: 2022-09-20 | End: 2022-09-20 | Stop reason: HOSPADM

## 2022-09-20 RX ORDER — MIDAZOLAM HYDROCHLORIDE 1 MG/ML
0.5 INJECTION, SOLUTION INTRAMUSCULAR; INTRAVENOUS
Status: DISCONTINUED | OUTPATIENT
Start: 2022-09-20 | End: 2022-09-20 | Stop reason: HOSPADM

## 2022-09-20 RX ORDER — PROPOFOL 10 MG/ML
INJECTION, EMULSION INTRAVENOUS AS NEEDED
Status: DISCONTINUED | OUTPATIENT
Start: 2022-09-20 | End: 2022-09-20 | Stop reason: HOSPADM

## 2022-09-20 RX ORDER — ROCURONIUM BROMIDE 10 MG/ML
INJECTION, SOLUTION INTRAVENOUS AS NEEDED
Status: DISCONTINUED | OUTPATIENT
Start: 2022-09-20 | End: 2022-09-20 | Stop reason: HOSPADM

## 2022-09-20 RX ORDER — HYDROMORPHONE HYDROCHLORIDE 1 MG/ML
0.2 INJECTION, SOLUTION INTRAMUSCULAR; INTRAVENOUS; SUBCUTANEOUS
Status: DISCONTINUED | OUTPATIENT
Start: 2022-09-20 | End: 2022-09-20 | Stop reason: HOSPADM

## 2022-09-20 RX ORDER — SODIUM CHLORIDE 0.9 % (FLUSH) 0.9 %
5-40 SYRINGE (ML) INJECTION AS NEEDED
Status: DISCONTINUED | OUTPATIENT
Start: 2022-09-20 | End: 2022-09-20 | Stop reason: HOSPADM

## 2022-09-20 RX ORDER — LIDOCAINE HYDROCHLORIDE 10 MG/ML
0.1 INJECTION, SOLUTION EPIDURAL; INFILTRATION; INTRACAUDAL; PERINEURAL AS NEEDED
Status: DISCONTINUED | OUTPATIENT
Start: 2022-09-20 | End: 2022-09-20 | Stop reason: HOSPADM

## 2022-09-20 RX ORDER — SODIUM CHLORIDE 9 MG/ML
INJECTION, SOLUTION INTRAVENOUS
Status: DISCONTINUED | OUTPATIENT
Start: 2022-09-20 | End: 2022-09-20 | Stop reason: HOSPADM

## 2022-09-20 RX ORDER — SUCCINYLCHOLINE CHLORIDE 20 MG/ML
INJECTION INTRAMUSCULAR; INTRAVENOUS AS NEEDED
Status: DISCONTINUED | OUTPATIENT
Start: 2022-09-20 | End: 2022-09-20 | Stop reason: HOSPADM

## 2022-09-20 RX ORDER — DEXAMETHASONE SODIUM PHOSPHATE 4 MG/ML
INJECTION, SOLUTION INTRA-ARTICULAR; INTRALESIONAL; INTRAMUSCULAR; INTRAVENOUS; SOFT TISSUE AS NEEDED
Status: DISCONTINUED | OUTPATIENT
Start: 2022-09-20 | End: 2022-09-20 | Stop reason: HOSPADM

## 2022-09-20 RX ORDER — HYDROMORPHONE HYDROCHLORIDE 1 MG/ML
INJECTION, SOLUTION INTRAMUSCULAR; INTRAVENOUS; SUBCUTANEOUS
Status: COMPLETED
Start: 2022-09-20 | End: 2022-09-20

## 2022-09-20 RX ORDER — DIPHENHYDRAMINE HYDROCHLORIDE 50 MG/ML
12.5 INJECTION, SOLUTION INTRAMUSCULAR; INTRAVENOUS AS NEEDED
Status: DISCONTINUED | OUTPATIENT
Start: 2022-09-20 | End: 2022-09-20 | Stop reason: HOSPADM

## 2022-09-20 RX ORDER — FENTANYL CITRATE 50 UG/ML
25 INJECTION, SOLUTION INTRAMUSCULAR; INTRAVENOUS
Status: COMPLETED | OUTPATIENT
Start: 2022-09-20 | End: 2022-09-20

## 2022-09-20 RX ORDER — MORPHINE SULFATE 2 MG/ML
2 INJECTION, SOLUTION INTRAMUSCULAR; INTRAVENOUS
Status: DISCONTINUED | OUTPATIENT
Start: 2022-09-20 | End: 2022-09-20 | Stop reason: HOSPADM

## 2022-09-20 RX ORDER — FENTANYL CITRATE 50 UG/ML
INJECTION, SOLUTION INTRAMUSCULAR; INTRAVENOUS AS NEEDED
Status: DISCONTINUED | OUTPATIENT
Start: 2022-09-20 | End: 2022-09-20 | Stop reason: HOSPADM

## 2022-09-20 RX ORDER — ONDANSETRON 2 MG/ML
4 INJECTION INTRAMUSCULAR; INTRAVENOUS AS NEEDED
Status: DISCONTINUED | OUTPATIENT
Start: 2022-09-20 | End: 2022-09-20 | Stop reason: HOSPADM

## 2022-09-20 RX ORDER — SODIUM CHLORIDE 9 MG/ML
50 INJECTION, SOLUTION INTRAVENOUS CONTINUOUS
Status: DISCONTINUED | OUTPATIENT
Start: 2022-09-20 | End: 2022-09-20 | Stop reason: HOSPADM

## 2022-09-20 RX ORDER — LIDOCAINE HYDROCHLORIDE 20 MG/ML
INJECTION, SOLUTION EPIDURAL; INFILTRATION; INTRACAUDAL; PERINEURAL AS NEEDED
Status: DISCONTINUED | OUTPATIENT
Start: 2022-09-20 | End: 2022-09-20 | Stop reason: HOSPADM

## 2022-09-20 RX ADMIN — Medication 2 UNITS: at 07:12

## 2022-09-20 RX ADMIN — SODIUM CHLORIDE, PRESERVATIVE FREE 10 ML: 5 INJECTION INTRAVENOUS at 07:13

## 2022-09-20 RX ADMIN — ACETAMINOPHEN 1000 MG: 500 TABLET, FILM COATED ORAL at 09:45

## 2022-09-20 RX ADMIN — CEFAZOLIN 3 G: 330 INJECTION, POWDER, FOR SOLUTION INTRAMUSCULAR; INTRAVENOUS at 18:00

## 2022-09-20 RX ADMIN — LOSARTAN POTASSIUM 100 MG: 50 TABLET, FILM COATED ORAL at 09:45

## 2022-09-20 RX ADMIN — FENTANYL CITRATE 25 MCG: 50 INJECTION, SOLUTION INTRAMUSCULAR; INTRAVENOUS at 21:01

## 2022-09-20 RX ADMIN — GABAPENTIN 300 MG: 300 CAPSULE ORAL at 09:48

## 2022-09-20 RX ADMIN — PROPOFOL 40 MG: 10 INJECTION, EMULSION INTRAVENOUS at 18:10

## 2022-09-20 RX ADMIN — SODIUM CHLORIDE, PRESERVATIVE FREE 10 ML: 5 INJECTION INTRAVENOUS at 00:55

## 2022-09-20 RX ADMIN — PHENYLEPHRINE HYDROCHLORIDE 40 MCG/MIN: 10 INJECTION INTRAVENOUS at 17:56

## 2022-09-20 RX ADMIN — ROCURONIUM BROMIDE 20 MG: 10 SOLUTION INTRAVENOUS at 18:10

## 2022-09-20 RX ADMIN — FENTANYL CITRATE 25 MCG: 50 INJECTION, SOLUTION INTRAMUSCULAR; INTRAVENOUS at 20:50

## 2022-09-20 RX ADMIN — ACETAMINOPHEN 1000 MG: 500 TABLET, FILM COATED ORAL at 18:00

## 2022-09-20 RX ADMIN — LIDOCAINE HYDROCHLORIDE 60 MG: 20 INJECTION, SOLUTION EPIDURAL; INFILTRATION; INTRACAUDAL; PERINEURAL at 17:34

## 2022-09-20 RX ADMIN — ONDANSETRON HYDROCHLORIDE 4 MG: 2 INJECTION, SOLUTION INTRAMUSCULAR; INTRAVENOUS at 18:00

## 2022-09-20 RX ADMIN — SODIUM CHLORIDE 50 ML/HR: 900 INJECTION, SOLUTION INTRAVENOUS at 17:00

## 2022-09-20 RX ADMIN — Medication 2 UNITS: at 22:41

## 2022-09-20 RX ADMIN — HYDROMORPHONE HYDROCHLORIDE 0.5 MG: 1 INJECTION, SOLUTION INTRAMUSCULAR; INTRAVENOUS; SUBCUTANEOUS at 20:38

## 2022-09-20 RX ADMIN — HEPARIN SODIUM 5000 UNITS: 5000 INJECTION INTRAVENOUS; SUBCUTANEOUS at 00:55

## 2022-09-20 RX ADMIN — FENTANYL CITRATE 25 MCG: 50 INJECTION, SOLUTION INTRAMUSCULAR; INTRAVENOUS at 21:22

## 2022-09-20 RX ADMIN — DEXAMETHASONE SODIUM PHOSPHATE 2 MG: 4 INJECTION, SOLUTION INTRAMUSCULAR; INTRAVENOUS at 18:00

## 2022-09-20 RX ADMIN — FENTANYL CITRATE 50 MCG: 50 INJECTION, SOLUTION INTRAMUSCULAR; INTRAVENOUS at 17:34

## 2022-09-20 RX ADMIN — INSULIN GLARGINE 40 UNITS: 100 INJECTION, SOLUTION SUBCUTANEOUS at 22:40

## 2022-09-20 RX ADMIN — SODIUM CHLORIDE: 900 INJECTION, SOLUTION INTRAVENOUS at 17:08

## 2022-09-20 RX ADMIN — DOCUSATE SODIUM 50MG AND SENNOSIDES 8.6MG 1 TABLET: 8.6; 5 TABLET, FILM COATED ORAL at 09:45

## 2022-09-20 RX ADMIN — SUCCINYLCHOLINE CHLORIDE 200 MG: 20 INJECTION, SOLUTION INTRAMUSCULAR; INTRAVENOUS at 17:34

## 2022-09-20 RX ADMIN — FENTANYL CITRATE 100 MCG: 50 INJECTION, SOLUTION INTRAMUSCULAR; INTRAVENOUS at 17:10

## 2022-09-20 RX ADMIN — MIDAZOLAM HYDROCHLORIDE 2 MG: 1 INJECTION, SOLUTION INTRAMUSCULAR; INTRAVENOUS at 17:10

## 2022-09-20 RX ADMIN — FENTANYL CITRATE 25 MCG: 50 INJECTION, SOLUTION INTRAMUSCULAR; INTRAVENOUS at 20:55

## 2022-09-20 RX ADMIN — FENTANYL CITRATE 50 MCG: 50 INJECTION, SOLUTION INTRAMUSCULAR; INTRAVENOUS at 18:17

## 2022-09-20 RX ADMIN — HYDROMORPHONE HYDROCHLORIDE 0.5 MG: 1 INJECTION, SOLUTION INTRAMUSCULAR; INTRAVENOUS; SUBCUTANEOUS at 20:41

## 2022-09-20 RX ADMIN — PROPOFOL 200 MG: 10 INJECTION, EMULSION INTRAVENOUS at 17:34

## 2022-09-20 RX ADMIN — SODIUM CHLORIDE, PRESERVATIVE FREE 10 ML: 5 INJECTION INTRAVENOUS at 14:00

## 2022-09-20 RX ADMIN — SODIUM CHLORIDE, PRESERVATIVE FREE 10 ML: 5 INJECTION INTRAVENOUS at 22:44

## 2022-09-20 RX ADMIN — GABAPENTIN 300 MG: 300 CAPSULE ORAL at 22:42

## 2022-09-20 RX ADMIN — ROCURONIUM BROMIDE 5 MG: 10 SOLUTION INTRAVENOUS at 17:34

## 2022-09-20 NOTE — PROGRESS NOTES
Physical Therapy    Order received, chart reviewed, evaluation held. Pt planned for OR today and per ortho note: strict LE elevation and bedrest in prep for ORIF L ankle. Will follow up post op as indicated.     Thank you,  Madalyn Gibbs, PT, DPT

## 2022-09-20 NOTE — PROGRESS NOTES
[id number]  Hospitalist Group                                                                                          Hospitalist Progress Note  Lanre Garza MD  Answering service: [phone] from in house phone        Date of Service:  2022  NAME:  Fernanda Rivera  :  1960  MRN:  [id number]      Admission Summary:   Fernanda Rivera is a 58 y.o. male who presents with fall and ankle fracture. Patient has past medical history of end-stage renal disease on hemodialysis. He said he struck his head he complains of left ankle injury. There was an injury over the medial malleolus . Right now patient complains of severe ankle pain was sedated for left ankle stabilization. Denies any chest pain, shortness of breath, fevers or recent illnesses. No headache. He does receive heparin for dialysis. He was due for dialysis today and typically receives it  and Friday. Last dialysis was Wednesday. He sees Dr. nAushka Joseph. Nephrology consulted for resuming hemodialysis     Interval history / Subjective:   Plan for ORIF today discussed with orthopedic surgery     Assessment & Plan:     Left ankle dislocation, left distal fibula fracture s/p mechanical fall  S/p Closed reduction in ED  Per ortho: Ice/elevate;  If he is physically able, may work on bed to chair transfers with PT; strict NWB LLE  Pain control PRN  Plan definitive fixation this week with foot/ankle Dr. Murray Hubbard  Mild leukocytosis likely reactive 2/2 this- resolved     ESRD on HD MWF  Anemia of CKD  Nephrology following  Unclear why on lasix 80 bid and gabapentin 600 bid at home (max dose of gabapentin is 300mg tiw after HD)    Panic attacks, anxiety   Likely 2/2 the abrupt discontinuation of chronic gabapentin which can cause irritability, tachycardia, diaphoresis (ie anxiety), therefore resumed gabapentin at 300 bid for now; I have ordered a taper to get to the appropriate regimen in ESRD Mechanical fall, \"legs buckled\"  Denies any pre-syncopal symptoms  CT head no acute abnormality  Carotid Doppler w/o b/l ICA stenosis, VA s patent, incidental large left thyroid nodule - f/up PCP for thyroid US [facility]NAC      HTN  Cont home ARB  IV PRN    DM2, a1c 12.8, uncontrolled  SSI, Lantus, Humalog- cont to adjust PRN    H/o hypothyroidism  TSH low at 0.25  Interestingly, pt admits to taking his 25mcg LT4 only every other day or every 3 days, whenever he remembers  So may not need LT4 at all ? Will hold for now  F/up with PCP for recheck TSH    Hyponatremia  Likely SIADH from ankle fracture, stress, pain, anxiety  Resolved mild 134       Code status: Full Code  Prophylaxis: Heparin ppx  Care Plan discussed with: Patient/Family, Nurse, and   Anticipated Disposition: Home  Anticipated Discharge: Greater than 48 hours     Hospital Problems  Never Reviewed            Codes Class Noted POA    Bimalleolar fracture of left ankle ICD-10-CM: H02.092N  ICD-9-CM: 824.4  9/17/2022 Unknown        Syncope ICD-10-CM: R55  ICD-9-CM: 780.2  9/16/2022 Unknown       Review of Systems:   Pertinent items are noted in HPI    Vital Signs:    Last 24hrs VS reviewed since prior progress note. Most recent are:  Visit Vitals  BP (!) 111/59 (BP 1 Location: Right upper arm, BP Patient Position: At rest)   Pulse 84   Temp 97.5 °F (36.4 °C)   Resp 19   Ht 6' (1.829 m)   Wt 136.6 kg (301 lb 2.4 oz)   SpO2 94%   BMI 40.84 kg/m²         Intake/Output Summary (Last 24 hours) at 9/20/2022 1017  Last data filed at 9/19/2022 2151  Gross per 24 hour   Intake 240 ml   Output 4525 ml   Net -4285 ml          Physical Examination:   I had a face to face encounter with this patient and independently examined them on 9/20/2022 as outlined below:    General: Alert, cooperative, no acute distress    EENT:  EOMI. Anicteric sclerae. MMM  Resp:  CTA bilaterally, no wheezing or rales.  No accessory muscle use  CV:  RRR, No audible murmur  GI:  Soft, Non distended, Non tender  Neurologic:  Alert and oriented, normal speech, [place]  Extremities: LLE wrapped, RLE no edema or cyanosis  Psych:   Not anxious, not agitated  Skin:  No rashes. No jaundice       Data Review:   I personally reviewed: vitals, labs, imaging results, notes    Labs:     Recent Labs     09/20/22 0056 09/18/22 0123   WBC 8.7 8.4   HGB 10.9* 11.3*   HCT 31.3* 31.8*    150       Recent Labs     09/20/22 0056 09/19/22 0322 09/18/22 0123   * 130* 129*   K 4.6 4.2 4.2   CL 98 94* 94*   CO2 27 24 26   BUN 41* 69* 51*   CREA 4.98* 6.98* 5.42*   * 234* 245*   CA 10.2* 9.8 9.5   MG  --   --  2.0   PHOS 7.3* 8.9* 7.1*       Recent Labs     09/20/22 0056 09/19/22 0322   ALB 2.7* 2.8*       No results for input(s): INR, PTP, APTT, INREXT, INREXT in the last 72 hours. No results for input(s): FE, TIBC, PSAT, FERR in the last 72 hours. No results found for: FOL, RBCF   No results for input(s): PH, PCO2, PO2 in the last 72 hours. No results for input(s): CPK, CKNDX, TROIQ in the last 72 hours.     No lab exists for component: CPKMB  Lab Results   Component Value Date/Time    Cholesterol, total 142 11/30/2011 09:00 AM    HDL Cholesterol 22 11/30/2011 09:00 AM    LDL, calculated 53.2 11/30/2011 09:00 AM    Triglyceride 334 (H) 11/30/2011 09:00 AM    CHOL/HDL Ratio 6.5 (H) 11/30/2011 09:00 AM     Lab Results   Component Value Date/Time    Glucose (POC) 179 (H) 09/20/2022 06:20 AM    Glucose (POC) 180 (H) 09/19/2022 09:29 PM    Glucose (POC) 231 (H) 09/19/2022 04:30 PM    Glucose (POC) 234 (H) 09/19/2022 05:53 AM    Glucose (POC) 279 (H) 09/18/2022 09:53 PM     Lab Results   Component Value Date/Time    Color YELLOW/STRAW 09/16/2022 09:00 PM    Appearance CLEAR 09/16/2022 09:00 PM    Specific gravity 1.017 09/16/2022 09:00 PM    pH (UA) 5.5 09/16/2022 09:00 PM    Protein >300 (A) 09/16/2022 09:00 PM    Glucose 250 (A) 09/16/2022 09:00 PM    Ketone Negative 09/16/2022 09:00 PM Bilirubin Negative 09/16/2022 09:00 PM    Urobilinogen 0.2 09/16/2022 09:00 PM    Nitrites Negative 09/16/2022 09:00 PM    Leukocyte Esterase Negative 09/16/2022 09:00 PM    Epithelial cells FEW 09/16/2022 09:00 PM    Bacteria Negative 09/16/2022 09:00 PM    WBC 0-4 09/16/2022 09:00 PM    RBC 5-10 09/16/2022 09:00 PM       Medications Reviewed:     Current Facility-Administered Medications   Medication Dose Route Frequency    oxyCODONE IR (ROXICODONE) tablet 5-10 mg  5-10 mg Oral Q4H PRN    gabapentin (NEURONTIN) capsule 300 mg  300 mg Oral BID    Followed by    Brian Bourgeois ON 9/21/2022] gabapentin (NEURONTIN) capsule 300 mg  300 mg Oral ACD    Followed by    Brian Bourgeois ON 9/26/2022] gabapentin (NEURONTIN) capsule 300 mg  300 mg Oral DIALYSIS MON, WED & FRI    insulin glargine (LANTUS) injection 40 Units  40 Units SubCUTAneous QHS    senna-docusate (PERICOLACE) 8.6-50 mg per tablet 1 Tablet  1 Tablet Oral DAILY    acetaminophen (TYLENOL) tablet 1,000 mg  1,000 mg Oral BID    insulin lispro (HUMALOG) injection 10 Units  10 Units SubCUTAneous TIDAC    insulin lispro (HUMALOG) injection   SubCUTAneous AC&HS    glucose chewable tablet 16 g  4 Tablet Oral PRN    glucagon (GLUCAGEN) injection 1 mg  1 mg IntraMUSCular PRN    dextrose 10 % infusion 0-250 mL  0-250 mL IntraVENous PRN    hydrALAZINE (APRESOLINE) 20 mg/mL injection 20 mg  20 mg IntraVENous Q4H PRN    losartan (COZAAR) tablet 100 mg  100 mg Oral DAILY    albumin human 25% (BUMINATE) solution 25 g  25 g IntraVENous DIALYSIS PRN    sodium chloride (NS) flush 5-40 mL  5-40 mL IntraVENous Q8H    sodium chloride (NS) flush 5-40 mL  5-40 mL IntraVENous PRN    polyethylene glycol (MIRALAX) packet 17 g  17 g Oral DAILY PRN    ondansetron (ZOFRAN ODT) tablet 4 mg  4 mg Oral Q8H PRN    Or    ondansetron (ZOFRAN) injection 4 mg  4 mg IntraVENous Q6H PRN    heparin (porcine) injection 5,000 Units  5,000 Units SubCUTAneous Q8H    acetaminophen (TYLENOL) tablet 650 mg  650 mg Oral Q4H PRN    Or    acetaminophen (TYLENOL) suppository 650 mg  650 mg Rectal Q6H PRN    fentaNYL citrate (PF) injection 25 mcg  25 mcg IntraVENous Q4H PRN    heparin (porcine) 1,000 unit/mL injection 2,200 Units  2,200 Units InterCATHeter DIALYSIS PRN    And    heparin (porcine) 1,000 unit/mL injection 2,100 Units  2,100 Units InterCATHeter DIALYSIS PRN     ______________________________________________________________________  EXPECTED LENGTH OF STAY: 4d 0h  ACTUAL LENGTH OF STAY:          3                 Bryce Schroeder MD

## 2022-09-20 NOTE — ANESTHESIA PREPROCEDURE EVALUATION
Relevant Problems   No relevant active problems       Anesthetic History   No history of anesthetic complications            Review of Systems / Medical History  Patient summary reviewed, nursing notes reviewed and pertinent labs reviewed    Pulmonary          Undiagnosed apnea         Neuro/Psych             Comments: Sciatica (M54.30)    Dorsalgia (M54.9)    Polyneuropathy (G62.9) Cardiovascular    Hypertension      CHF        Exercise tolerance: >4 METS  Comments: Sinus rhythm with 1st degree AV block with occasional and consecutive   premature ventricular complexes   Right bundle branch block   Left anterior fascicular block    GI/Hepatic/Renal         Renal disease: ESRD and dialysis       Endo/Other    Diabetes    Morbid obesity and anemia     Other Findings   Comments: Bimalleolar fracture of left ankle  Denies other injuries         Physical Exam    Airway  Mallampati: III  TM Distance: > 6 cm  Neck ROM: short neck   Mouth opening: Diminished (comment)     Cardiovascular  Regular rate and rhythm,  S1 and S2 normal,  no murmur, click, rub, or gallop  Rhythm: regular  Rate: normal         Dental    Dentition: Poor dentition  Comments: broken and decayed    Pulmonary  Breath sounds clear to auscultation               Abdominal  GI exam deferred       Other Findings            Anesthetic Plan    ASA: 3  Anesthesia type: general      Post-op pain plan if not by surgeon: peripheral nerve block single    Induction: Intravenous  Anesthetic plan and risks discussed with: Patient

## 2022-09-20 NOTE — PROGRESS NOTES
Occupational Therapy:    Order received, chart reviewed, evaluation held. Pt planned for OR today and per ortho note: strict LE elevation and bedrest in prep for ORIF L ankle. Will follow up post op as indicated.     [facility], OTR/L

## 2022-09-20 NOTE — PROGRESS NOTES
Nephrology Progress Note  Milagro Hand  Date of Admission : 9/16/2022    CC:  Follow up for ESRD       Assessment and Plan     ESRD on HD:  - MWF at [facility]  - HD tomorrow per routin    Volume overload:  - improving    HTN:  - cont current meds    Anemia of CKD:  - hold ADONIS for now  - will likely need this post op    Secondary HPT    L distal fibula fracture:  - for ORIF today    DM2  Obesity  Noncompliance       Interval History:  Seen and examined. Feeling ok. For ORIF today. No cp or sob. C/o fatigue. Pain seems controlled    Current Medications: all current  Medications have been eviewed in EPIC  Review of Systems: Pertinent items are noted in HPI. Objective:  Vitals:    Vitals:    09/20/22 0215 09/20/22 0306 09/20/22 0508 09/20/22 0820   BP:  (!) 162/64  (!) 111/59   Pulse:  88  84   Resp:  18  19   Temp:  98.8 °F (37.1 °C)  97.5 °F (36.4 °C)   SpO2: 95% 95%  94%   Weight:   136.6 kg (301 lb 2.4 oz)    Height:   6' (1.829 m)      Intake and Output:  No intake/output data recorded. 09/18 1901 - 09/20 0700  In: 240 [P.O.:240]  Out: 4525 [Urine:525]    Physical Examination:    General: NAD,Conversant   Neck:  Supple, no mass  Resp:  Lungs CTA B/L, no wheezing , normal respiratory effort  CV:  RRR,  no murmur or rub, 2+ LE edema  GI:  Soft, NT, + Bowel sounds, no hepatosplenomegaly  Neurologic:  Non focal  Psych:             AAO x 3 appropriate affect  Skin:  No Rash  Access:           RIJ PC c/d/i    []    High complexity decision making was performed  []    Patient is at high-risk of decompensation with multiple organ involvement    Lab Data Personally Reviewed: I have reviewed all the pertinent labs, microbiology data and radiology studies during assessment.     Recent Labs     09/20/22  0056 09/19/22  0322 09/18/22  0123   * 130* 129*   K 4.6 4.2 4.2   CL 98 94* 94*   CO2 27 24 26   * 234* 245*   BUN 41* 69* 51*   CREA 4.98* 6.98* 5.42*   CA 10.2* 9.8 9.5   MG  --   --  2.0 PHOS 7.3* 8.9* 7.1*   ALB 2.7* 2.8*  --        Recent Labs     09/20/22  0056 09/18/22  0123   WBC 8.7 8.4   HGB 10.9* 11.3*   HCT 31.3* 31.8*    150       No results found for: SDES  No results found for: CULT  Recent Results (from the past 24 hour(s))   GLUCOSE, POC    Collection Time: 09/19/22  4:30 PM   Result Value Ref Range    Glucose (POC) 231 (H) 65 - 117 mg/dL    Performed by Avinash Waters    GLUCOSE, POC    Collection Time: 09/19/22  9:29 PM   Result Value Ref Range    Glucose (POC) 180 (H) 65 - 117 mg/dL    Performed by Andres Boles    CBC W/O DIFF    Collection Time: 09/20/22 12:56 AM   Result Value Ref Range    WBC 8.7 4.1 - 11.1 K/uL    RBC 3.44 (L) 4.10 - 5.70 M/uL    HGB 10.9 (L) 12.1 - 17.0 g/dL    HCT 31.3 (L) 36.6 - 50.3 %    MCV 91.0 80.0 - 99.0 FL    MCH 31.7 26.0 - 34.0 PG    MCHC 34.8 30.0 - 36.5 g/dL    RDW 12.5 11.5 - 14.5 %    PLATELET 743 680 - 069 K/uL    MPV 10.0 8.9 - 12.9 FL    NRBC 0.0 0  WBC    ABSOLUTE NRBC 0.00 0.00 - 0.01 K/uL   RENAL FUNCTION PANEL    Collection Time: 09/20/22 12:56 AM   Result Value Ref Range    Sodium 134 (L) 136 - 145 mmol/L    Potassium 4.6 3.5 - 5.1 mmol/L    Chloride 98 97 - 108 mmol/L    CO2 27 21 - 32 mmol/L    Anion gap 9 5 - 15 mmol/L    Glucose 218 (H) 65 - 100 mg/dL    BUN 41 (H) 6 - 20 MG/DL    Creatinine 4.98 (H) 0.70 - 1.30 MG/DL    BUN/Creatinine ratio 8 (L) 12 - 20      GFR est AA 14 (L) >60 ml/min/1.73m2    GFR est non-AA 12 (L) >60 ml/min/1.73m2    Calcium 10.2 (H) 8.5 - 10.1 MG/DL    Phosphorus 7.3 (H) 2.6 - 4.7 MG/DL    Albumin 2.7 (L) 3.5 - 5.0 g/dL   GLUCOSE, POC    Collection Time: 09/20/22  6:20 AM   Result Value Ref Range    Glucose (POC) 179 (H) 65 - 117 mg/dL    Performed by Rajendra Alberto MD  [address]  Phone - [phone]   Fax - [phone]  [email or web address]

## 2022-09-20 NOTE — PROGRESS NOTES
[facility] FRACTURE PROGRESS NOTE    2022  Admit Date:   2022    Post Op day: * No surgery date entered *    Subjective:    Sydney Klein states that he is somewhat more comfortable today. Pain has reduced from yesterday. He is awaiting surgical fixation of his left ankle.   N.p.o. at this time  Denies nausea, vomiting, chest pains or shortness of breath    PT/OT:   Gait:                    Vital Signs:    Patient Vitals for the past 8 hrs:   BP Temp Pulse Resp SpO2 Height Weight   22 0820 (!) 111/59 97.5 °F (36.4 °C) 84 19 94 % -- --   22 0508 -- -- -- -- -- 6' (1.829 m) 136.6 kg (301 lb 2.4 oz)   22 0306 (!) 162/64 98.8 °F (37.1 °C) 88 18 95 % -- --   22 0215 -- -- -- -- 95 % -- --     Temp (24hrs), Av.4 °F (36.9 °C), Min:97.5 °F (36.4 °C), Max:99 °F (37.2 °C)      Pain Control:   Pain Assessment  Pain Scale 1: Numeric (0 - 10)  Pain Intensity 1: 10  Pain Location 1: Ankle  Pain Orientation 1: Left  Pain Intervention(s) 1: Medication (see MAR)    Meds:    Current Facility-Administered Medications   Medication Dose Route Frequency    oxyCODONE IR (ROXICODONE) tablet 5-10 mg  5-10 mg Oral Q4H PRN    gabapentin (NEURONTIN) capsule 300 mg  300 mg Oral BID    Followed by    Rodriguez Noble ON 2022] gabapentin (NEURONTIN) capsule 300 mg  300 mg Oral ACD    Followed by    Rodriguez Noble ON 2022] gabapentin (NEURONTIN) capsule 300 mg  300 mg Oral DIALYSIS MON, WED & FRI    insulin glargine (LANTUS) injection 40 Units  40 Units SubCUTAneous QHS    senna-docusate (PERICOLACE) 8.6-50 mg per tablet 1 Tablet  1 Tablet Oral DAILY    acetaminophen (TYLENOL) tablet 1,000 mg  1,000 mg Oral BID    insulin lispro (HUMALOG) injection 10 Units  10 Units SubCUTAneous TIDAC    insulin lispro (HUMALOG) injection   SubCUTAneous AC&HS    glucose chewable tablet 16 g  4 Tablet Oral PRN    glucagon (GLUCAGEN) injection 1 mg  1 mg IntraMUSCular PRN    dextrose 10 % infusion 0-250 mL  0-250 mL IntraVENous PRN    hydrALAZINE (APRESOLINE) 20 mg/mL injection 20 mg  20 mg IntraVENous Q4H PRN    losartan (COZAAR) tablet 100 mg  100 mg Oral DAILY    albumin human 25% (BUMINATE) solution 25 g  25 g IntraVENous DIALYSIS PRN    sodium chloride (NS) flush 5-40 mL  5-40 mL IntraVENous Q8H    sodium chloride (NS) flush 5-40 mL  5-40 mL IntraVENous PRN    polyethylene glycol (MIRALAX) packet 17 g  17 g Oral DAILY PRN    ondansetron (ZOFRAN ODT) tablet 4 mg  4 mg Oral Q8H PRN    Or    ondansetron (ZOFRAN) injection 4 mg  4 mg IntraVENous Q6H PRN    heparin (porcine) injection 5,000 Units  5,000 Units SubCUTAneous Q8H    acetaminophen (TYLENOL) tablet 650 mg  650 mg Oral Q4H PRN    Or    acetaminophen (TYLENOL) suppository 650 mg  650 mg Rectal Q6H PRN    fentaNYL citrate (PF) injection 25 mcg  25 mcg IntraVENous Q4H PRN    heparin (porcine) 1,000 unit/mL injection 2,200 Units  2,200 Units InterCATHeter DIALYSIS PRN    And    heparin (porcine) 1,000 unit/mL injection 2,100 Units  2,100 Units InterCATHeter DIALYSIS PRN       LAB:    Recent Labs     09/20/22  0056   HCT 31.3*   HGB 10.9*       Transfuse PRBC's:      Assessment & Physician's Comment:  Dressing is clean, dry, and intact  Orientation:  Oriented  Left lower extremity splint is dry and intact; moves through toes to command; distal sensory function is grossly diminished but this is equal bilaterally    Active Problems:    Syncope (9/16/2022)      Bimalleolar fracture of left ankle (9/17/2022)        Plan:    Remain n.p.o.   Have educated patient on need to elevate limb to help reduce swelling but he declines at this time  Pain medication as needed  Medical management per primary team  To OR later this afternoon for ORIF of left ankle with Dr. Blue Daniels 1623

## 2022-09-20 NOTE — CONSULTS
Ortho progress note -    2022  Admit Date:   2022    Pre-op Note:    Subjective:    Merwyn Later  Patient awaiting surgery. I have been consulted to proceed with surgical treatment of left ankle fracture. Plan for surgery 22 at 5 pm.  No consent in chart. Vital Signs:    Patient Vitals for the past 8 hrs:   BP Temp Pulse Resp SpO2   22 1333 (!) 156/66 98.5 °F (36.9 °C) 75 16 99 %   22 0820 (!) 111/59 97.5 °F (36.4 °C) 84 19 94 %     Temp (24hrs), Av.5 °F (36.9 °C), Min:97.5 °F (36.4 °C), Max:99 °F (37.2 °C)        LAB:    Recent Labs     22  0056   HCT 31.3*   HGB 10.9*         Physical Exam:  Appearance: resting comfortably, NAD, appears sleepy and not very alert, periods of falling asleep during pre op discussion. Neurovascular exam: Intact. Dressing/Wound: clean and dry splint intact. Decreased sensation with 5.07 filament test.    Assessment & Physician's Comment:  Pre op note. Plan:      Consent needs to be obtained for \"Left ankle fracture open reduction and internal fixation\". Unable to obtain from patient now, appears sedated. Informed patient of surgical risks, potential complications, expected outcomes. Risk high for complications related to underlying diabetes with neuropathy. Patient is not very coherent during exam today and not sure how much of the information provided was understood. Will need Ancef 3 grams prior to surgery if not allergic. Recommend regional pain block to help with post op pain.         Vamsi Sheriff MD

## 2022-09-20 NOTE — PROGRESS NOTES
Pt scheduled for L ankle ORIF today. Pt A&O x2 unable to explain surgery or if has even spoke with surgeon. Attempted to call Melissa Gutierrez (spouse) [phone] no answer and [phone] is not a working number. Consent not signed.

## 2022-09-20 NOTE — ANESTHESIA PROCEDURE NOTES
Peripheral Block    Performed by: Luis Florian DO  Authorized by: Luis Florian DO       Pre-procedure:    Indications: at surgeon's request, post-op pain management and procedure for pain    Preanesthetic Checklist: patient identified, risks and benefits discussed, site marked, timeout performed, anesthesia consent given and patient being monitored      Block Type:   Block Type:  Popliteal  Laterality:  Left  Monitoring:  Standard ASA monitoring, continuous pulse ox, frequent vital sign checks, heart rate, responsive to questions and oxygen  Injection Technique:  Single shot  Procedures: ultrasound guided    Patient Position: supine  Prep: chlorhexidine    Location:  Lower thigh  Needle Type:  Stimuplex  Needle Gauge:  22 G  Needle Localization:  Nerve stimulator and ultrasound guidance  Motor Response comment:   Motor Response: minimal motor response >0.4 mA    Med Admin Time: 9/20/2022 5:18 PM    Assessment:  Number of attempts:  1  Injection Assessment:  Incremental injection every 5 mL, local visualized surrounding nerve on ultrasound, negative aspiration for CSF, negative aspiration for blood, no paresthesia, no intravascular symptoms and ultrasound image on chart  Patient tolerance:  Patient tolerated the procedure well with no immediate complications

## 2022-09-21 LAB
GLUCOSE BLD STRIP.AUTO-MCNC: 119 MG/DL (ref 65–117)
GLUCOSE BLD STRIP.AUTO-MCNC: 160 MG/DL (ref 65–117)
GLUCOSE BLD STRIP.AUTO-MCNC: 186 MG/DL (ref 65–117)
SERVICE CMNT-IMP: ABNORMAL

## 2022-09-21 PROCEDURE — 74011250636 HC RX REV CODE- 250/636: Performed by: ORTHOPAEDIC SURGERY

## 2022-09-21 PROCEDURE — 74011000250 HC RX REV CODE- 250: Performed by: ORTHOPAEDIC SURGERY

## 2022-09-21 PROCEDURE — 74011250637 HC RX REV CODE- 250/637: Performed by: ORTHOPAEDIC SURGERY

## 2022-09-21 PROCEDURE — 74011636637 HC RX REV CODE- 636/637: Performed by: ORTHOPAEDIC SURGERY

## 2022-09-21 PROCEDURE — 65270000029 HC RM PRIVATE

## 2022-09-21 PROCEDURE — 27822 TREATMENT OF ANKLE FRACTURE: CPT | Performed by: ORTHOPAEDIC SURGERY

## 2022-09-21 PROCEDURE — 82962 GLUCOSE BLOOD TEST: CPT

## 2022-09-21 PROCEDURE — 74011250636 HC RX REV CODE- 250/636: Performed by: PHYSICIAN ASSISTANT

## 2022-09-21 PROCEDURE — 90935 HEMODIALYSIS ONE EVALUATION: CPT

## 2022-09-21 PROCEDURE — 27829 TREAT LOWER LEG JOINT: CPT | Performed by: ORTHOPAEDIC SURGERY

## 2022-09-21 RX ORDER — FUROSEMIDE 40 MG/1
80 TABLET ORAL 2 TIMES DAILY
Status: DISCONTINUED | OUTPATIENT
Start: 2022-09-21 | End: 2022-10-03 | Stop reason: HOSPADM

## 2022-09-21 RX ORDER — OXYCODONE HYDROCHLORIDE 5 MG/1
5 TABLET ORAL
Status: DISCONTINUED | OUTPATIENT
Start: 2022-09-21 | End: 2022-10-03 | Stop reason: HOSPADM

## 2022-09-21 RX ORDER — SODIUM CHLORIDE 0.9 % (FLUSH) 0.9 %
5-40 SYRINGE (ML) INJECTION AS NEEDED
Status: DISCONTINUED | OUTPATIENT
Start: 2022-09-21 | End: 2022-10-03 | Stop reason: HOSPADM

## 2022-09-21 RX ORDER — SODIUM CHLORIDE 0.9 % (FLUSH) 0.9 %
5-40 SYRINGE (ML) INJECTION EVERY 8 HOURS
Status: DISCONTINUED | OUTPATIENT
Start: 2022-09-21 | End: 2022-10-03 | Stop reason: HOSPADM

## 2022-09-21 RX ORDER — MORPHINE SULFATE 2 MG/ML
2 INJECTION, SOLUTION INTRAMUSCULAR; INTRAVENOUS
Status: DISCONTINUED | OUTPATIENT
Start: 2022-09-21 | End: 2022-09-21

## 2022-09-21 RX ORDER — NALOXONE HYDROCHLORIDE 0.4 MG/ML
0.4 INJECTION, SOLUTION INTRAMUSCULAR; INTRAVENOUS; SUBCUTANEOUS AS NEEDED
Status: DISCONTINUED | OUTPATIENT
Start: 2022-09-21 | End: 2022-10-03 | Stop reason: HOSPADM

## 2022-09-21 RX ORDER — MORPHINE SULFATE 2 MG/ML
4 INJECTION, SOLUTION INTRAMUSCULAR; INTRAVENOUS
Status: DISCONTINUED | OUTPATIENT
Start: 2022-09-21 | End: 2022-09-29

## 2022-09-21 RX ORDER — HEPARIN SODIUM 5000 [USP'U]/ML
5000 INJECTION, SOLUTION INTRAVENOUS; SUBCUTANEOUS EVERY 8 HOURS
Status: DISCONTINUED | OUTPATIENT
Start: 2022-09-21 | End: 2022-09-28

## 2022-09-21 RX ORDER — ROSUVASTATIN CALCIUM 10 MG/1
20 TABLET, COATED ORAL
Status: DISCONTINUED | OUTPATIENT
Start: 2022-09-21 | End: 2022-09-23

## 2022-09-21 RX ORDER — HYDROXYZINE 25 MG/1
25-100 TABLET, FILM COATED ORAL
Status: DISCONTINUED | OUTPATIENT
Start: 2022-09-21 | End: 2022-10-03 | Stop reason: HOSPADM

## 2022-09-21 RX ADMIN — Medication 10 UNITS: at 16:41

## 2022-09-21 RX ADMIN — Medication 2 UNITS: at 16:41

## 2022-09-21 RX ADMIN — ACETAMINOPHEN 1000 MG: 500 TABLET, FILM COATED ORAL at 18:50

## 2022-09-21 RX ADMIN — SODIUM CHLORIDE, PRESERVATIVE FREE 10 ML: 5 INJECTION INTRAVENOUS at 22:00

## 2022-09-21 RX ADMIN — ROSUVASTATIN CALCIUM 20 MG: 10 TABLET, FILM COATED ORAL at 06:35

## 2022-09-21 RX ADMIN — HEPARIN SODIUM 5000 UNITS: 5000 INJECTION, SOLUTION INTRAVENOUS; SUBCUTANEOUS at 06:36

## 2022-09-21 RX ADMIN — MORPHINE SULFATE 4 MG: 2 INJECTION, SOLUTION INTRAMUSCULAR; INTRAVENOUS at 12:01

## 2022-09-21 RX ADMIN — ROSUVASTATIN CALCIUM 20 MG: 10 TABLET, FILM COATED ORAL at 21:54

## 2022-09-21 RX ADMIN — SODIUM CHLORIDE, PRESERVATIVE FREE 10 ML: 5 INJECTION INTRAVENOUS at 21:56

## 2022-09-21 RX ADMIN — INSULIN GLARGINE 40 UNITS: 100 INJECTION, SOLUTION SUBCUTANEOUS at 21:55

## 2022-09-21 RX ADMIN — HEPARIN SODIUM 5000 UNITS: 5000 INJECTION, SOLUTION INTRAVENOUS; SUBCUTANEOUS at 21:55

## 2022-09-21 RX ADMIN — HEPARIN SODIUM 2200 UNITS: 1000 INJECTION INTRAVENOUS; SUBCUTANEOUS at 10:12

## 2022-09-21 RX ADMIN — GABAPENTIN 300 MG: 300 CAPSULE ORAL at 16:42

## 2022-09-21 RX ADMIN — SODIUM CHLORIDE, PRESERVATIVE FREE 10 ML: 5 INJECTION INTRAVENOUS at 06:34

## 2022-09-21 RX ADMIN — HEPARIN SODIUM 2100 UNITS: 1000 INJECTION INTRAVENOUS; SUBCUTANEOUS at 10:11

## 2022-09-21 RX ADMIN — HEPARIN SODIUM 5000 UNITS: 5000 INJECTION, SOLUTION INTRAVENOUS; SUBCUTANEOUS at 16:41

## 2022-09-21 RX ADMIN — MORPHINE SULFATE 2 MG: 2 INJECTION, SOLUTION INTRAMUSCULAR; INTRAVENOUS at 08:29

## 2022-09-21 RX ADMIN — OXYCODONE 5 MG: 5 TABLET ORAL at 12:28

## 2022-09-21 NOTE — PROGRESS NOTES
Pt POD#1 s/p open reduction/internal fixator L ankle. Pt currently in dialysis. Will follow-up later today if able or when pt is medically ready to participate with PT.       Mitali Henriquez, DPT, PT

## 2022-09-21 NOTE — PROGRESS NOTES
[facility] Adult  Hospitalist Group                                                                                          Hospitalist Progress Note  Paulino Steiner MD  Answering service: [phone] from in house phone        Date of Service:  2022  NAME:  Manpreet Mclaughlin  :  1960  MRN:  [id number]      Admission Summary:   Manpreet Mclaughlin is a 58 y.o. male who presents with fall and ankle fracture. Patient has past medical history of end-stage renal disease on hemodialysis. He said he struck his head he complains of left ankle injury. There was an injury over the medial malleolus . Right now patient complains of severe ankle pain was sedated for left ankle stabilization. Denies any chest pain, shortness of breath, fevers or recent illnesses. No headache. He does receive heparin for dialysis. He was due for dialysis today and typically receives it  and Friday. Last dialysis was Wednesday. He sees Dr. Katt Barbosa. Nephrology consulted for resuming hemodialysis     Interval history / Subjective:     Patient is getting hemodialysis today status post ORIF for ankle fracture  Eventually patient will need to go to SNF     Assessment & Plan:     Left ankle dislocation, left distal fibula fracture s/p mechanical fall  S/p Closed reduction in ED  ORIF on 20  Per ortho: Ice/elevate;  If he is physically able, may work on bed to chair transfers with PT; strict NWB LLE  Pain control   Mild leukocytosis likely reactive 2/2 this- resolved     ESRD on HD MWF  Anemia of CKD  Nephrology following  Unclear why on lasix 80 bid and gabapentin 600 bid at home (max dose of gabapentin is 300mg tiw after HD)    Panic attacks, anxiety   Likely 2/2 the abrupt discontinuation of chronic gabapentin which can cause irritability, tachycardia, diaphoresis (ie anxiety), therefore resumed gabapentin at 300 bid for now     Mechanical fall, \"legs buckled\"  Denies any pre-syncopal symptoms  CT head no acute abnormality  Carotid Doppler w/o b/l ICA stenosis, VA s patent,   Incidental large left thyroid nodule - f/up PCP for thyroid US FNAC      HTN  Cont home ARB  IV PRN    DM2, a1c 12.8, uncontrolled  SSI, Lantus, Humalog- cont to adjust PRN    H/o hypothyroidism  TSH low at 0.25  Holding Levothyroxine    Hyponatremia  Likely SIADH from ankle fracture, stress, pain, anxiety  Resolved mild 134     Code status: Full Code  Prophylaxis: Heparin ppx  Care Plan discussed with: Patient/Family, Nurse, and   Anticipated Disposition: Home  Anticipated Discharge: Greater than 48 hours     Hospital Problems  Never Reviewed            Codes Class Noted POA    Bimalleolar fracture of left ankle ICD-10-CM: X11.243Y  ICD-9-CM: 824.4  9/17/2022 Unknown        Syncope ICD-10-CM: R55  ICD-9-CM: 780.2  9/16/2022 Unknown       Review of Systems:   Pertinent items are noted in HPI    Vital Signs:    Last 24hrs VS reviewed since prior progress note. Most recent are:  Visit Vitals  /60 (BP 1 Location: Right upper arm, BP Patient Position: At rest)   Pulse 90   Temp 98 °F (36.7 °C)   Resp 18   Ht 6' (1.829 m)   Wt 136.6 kg (301 lb 2.4 oz)   SpO2 96%   BMI 40.84 kg/m²         Intake/Output Summary (Last 24 hours) at 9/21/2022 1455  Last data filed at 9/21/2022 1200  Gross per 24 hour   Intake 300 ml   Output 3800 ml   Net -3500 ml          Physical Examination:   I had a face to face encounter with this patient and independently examined them on 9/21/2022 as outlined below:    General: Alert, cooperative, no acute distress    EENT:  EOMI. Anicteric sclerae. MMM  Resp:  CTA bilaterally, no wheezing or rales. No accessory muscle use  CV:  RRR, No audible murmur  GI:  Soft, Non distended, Non tender  Neurologic:  Alert and oriented, normal speech, E[place]ORADO  Extremities: LLE wrapped, RLE no edema or cyanosis  Psych:   Not anxious, not agitated  Skin:  No rashes.  No jaundice       Data Review:   I personally reviewed: vitals, labs, imaging results, notes    Labs:     Recent Labs     09/20/22 0056   WBC 8.7   HGB 10.9*   HCT 31.3*          Recent Labs     09/20/22 0056 09/19/22 0322   * 130*   K 4.6 4.2   CL 98 94*   CO2 27 24   BUN 41* 69*   CREA 4.98* 6.98*   * 234*   CA 10.2* 9.8   PHOS 7.3* 8.9*       Recent Labs     09/20/22 0056 09/19/22 0322   ALB 2.7* 2.8*       No results for input(s): INR, PTP, APTT, INREXT, INREXT in the last 72 hours. No results for input(s): FE, TIBC, PSAT, FERR in the last 72 hours. No results found for: FOL, RBCF   No results for input(s): PH, PCO2, PO2 in the last 72 hours. No results for input(s): CPK, CKNDX, TROIQ in the last 72 hours.     No lab exists for component: CPKMB  Lab Results   Component Value Date/Time    Cholesterol, total 142 11/30/2011 09:00 AM    HDL Cholesterol 22 11/30/2011 09:00 AM    LDL, calculated 53.2 11/30/2011 09:00 AM    Triglyceride 334 (H) 11/30/2011 09:00 AM    CHOL/HDL Ratio 6.5 (H) 11/30/2011 09:00 AM     Lab Results   Component Value Date/Time    Glucose (POC) 160 (H) 09/21/2022 06:54 AM    Glucose (POC) 212 (H) 09/20/2022 10:29 PM    Glucose (POC) 155 (H) 09/20/2022 08:35 PM    Glucose (POC) 130 (H) 09/20/2022 11:44 AM    Glucose (POC) 179 (H) 09/20/2022 06:20 AM     Lab Results   Component Value Date/Time    Color YELLOW/STRAW 09/16/2022 09:00 PM    Appearance CLEAR 09/16/2022 09:00 PM    Specific gravity 1.017 09/16/2022 09:00 PM    pH (UA) 5.5 09/16/2022 09:00 PM    Protein >300 (A) 09/16/2022 09:00 PM    Glucose 250 (A) 09/16/2022 09:00 PM    Ketone Negative 09/16/2022 09:00 PM    Bilirubin Negative 09/16/2022 09:00 PM    Urobilinogen 0.2 09/16/2022 09:00 PM    Nitrites Negative 09/16/2022 09:00 PM    Leukocyte Esterase Negative 09/16/2022 09:00 PM    Epithelial cells FEW 09/16/2022 09:00 PM    Bacteria Negative 09/16/2022 09:00 PM    WBC 0-4 09/16/2022 09:00 PM    RBC 5-10 09/16/2022 09:00 PM       Medications Reviewed: Current Facility-Administered Medications   Medication Dose Route Frequency    furosemide (LASIX) tablet 80 mg  80 mg Oral BID    hydrOXYzine HCL (ATARAX) tablet  mg   mg Oral Q8H PRN    rosuvastatin (CRESTOR) tablet 20 mg  20 mg Oral QHS    sodium chloride (NS) flush 5-40 mL  5-40 mL IntraVENous Q8H    sodium chloride (NS) flush 5-40 mL  5-40 mL IntraVENous PRN    oxyCODONE IR (ROXICODONE) tablet 5 mg  5 mg Oral Q4H PRN    naloxone (NARCAN) injection 0.4 mg  0.4 mg IntraVENous PRN    heparin (porcine) injection 5,000 Units  5,000 Units SubCUTAneous Q8H    morphine injection 4 mg  4 mg IntraVENous Q3H PRN    gabapentin (NEURONTIN) capsule 300 mg  300 mg Oral ACD    Followed by    Cortney Dumont ON 9/26/2022] gabapentin (NEURONTIN) capsule 300 mg  300 mg Oral DIALYSIS MON, WED & FRI    insulin glargine (LANTUS) injection 40 Units  40 Units SubCUTAneous QHS    senna-docusate (PERICOLACE) 8.6-50 mg per tablet 1 Tablet  1 Tablet Oral DAILY    acetaminophen (TYLENOL) tablet 1,000 mg  1,000 mg Oral BID    insulin lispro (HUMALOG) injection 10 Units  10 Units SubCUTAneous TIDAC    insulin lispro (HUMALOG) injection   SubCUTAneous AC&HS    glucose chewable tablet 16 g  4 Tablet Oral PRN    glucagon (GLUCAGEN) injection 1 mg  1 mg IntraMUSCular PRN    dextrose 10 % infusion 0-250 mL  0-250 mL IntraVENous PRN    hydrALAZINE (APRESOLINE) 20 mg/mL injection 20 mg  20 mg IntraVENous Q4H PRN    losartan (COZAAR) tablet 100 mg  100 mg Oral DAILY    albumin human 25% (BUMINATE) solution 25 g  25 g IntraVENous DIALYSIS PRN    sodium chloride (NS) flush 5-40 mL  5-40 mL IntraVENous Q8H    sodium chloride (NS) flush 5-40 mL  5-40 mL IntraVENous PRN    polyethylene glycol (MIRALAX) packet 17 g  17 g Oral DAILY PRN    ondansetron (ZOFRAN ODT) tablet 4 mg  4 mg Oral Q8H PRN    Or    ondansetron (ZOFRAN) injection 4 mg  4 mg IntraVENous Q6H PRN    acetaminophen (TYLENOL) tablet 650 mg  650 mg Oral Q4H PRN    Or    acetaminophen (TYLENOL) suppository 650 mg  650 mg Rectal Q6H PRN    heparin (porcine) 1,000 unit/mL injection 2,200 Units  2,200 Units InterCATHeter DIALYSIS PRN    And    heparin (porcine) 1,000 unit/mL injection 2,100 Units  2,100 Units InterCATHeter DIALYSIS PRN     ______________________________________________________________________  EXPECTED LENGTH OF STAY: 4d 0h  ACTUAL LENGTH OF STAY:          Moses Howard MD

## 2022-09-21 NOTE — PERIOP NOTES
2155: TRANSFER - OUT REPORT:    Verbal report given to Rosario Waite RN(name) on [facility]  being transferred to (unit) for routine post - op       Report consisted of patients Situation, Background, Assessment and   Recommendations(SBAR). Time Pre op antibiotic given:1800  Anesthesia Stop time: 2033    Information from the following report(s) SBAR, Kardex, Procedure Summary, Intake/Output, MAR, and Recent Results was reviewed with the receiving nurse. Opportunity for questions and clarification was provided. Is the patient on 02? YES       L/Min 2       Other none    Is the patient on a monitor? YES    Is the nurse transporting with the patient? YES    Surgical Waiting Area notified of patient's transfer from PACU?  NO      The following personal items collected during your admission accompanied patient upon transfer:   Dental Appliance: Dental Appliances: None  Vision: Visual Aid: Glasses, With patient  Hearing Aid:    Jewelry:    Clothing:    Other Valuables:    Valuables sent to safe:

## 2022-09-21 NOTE — OP NOTES
[address]  OPERATIVE REPORT    Name:  Kristy Engel  MR#:  [id number]  :  1960  ACCOUNT #:  [de-identified]  DATE OF SERVICE:  2022    PREOPERATIVE DIAGNOSIS:  Left ankle bimalleolar closed displaced fracture, initial encounter. POSTOPERATIVE DIAGNOSES:  1. Left ankle trimalleolar closed displaced fracture, initial encounter. 2.  Left ankle medial pressure wound measuring about 2 cm in diameter. 3.  Diabetes mellitus type 2 with medical complications to include kidney failure, peripheral neuropathy, vascular disease. 4.  Morbid obesity. PROCEDURES PERFORMED:  1. Left ankle trimalleolar ankle fracture open reduction and internal fixation (CPT 73306). 2.  Left ankle syndesmosis fixation (CPT 54522). 3.  Left ankle fluoroscopic inversion stress evaluation intraoperatively (CPT 17324). SURGEON:  Jessy Velez MD    ASSISTANT: [facility] surgical first assistant. ANESTHESIA:  General endotracheal and regional pain block. IV FLUIDS:  300 mL. COMPLICATIONS:  None. SPECIMENS REMOVED:  None. IMPLANTS:  Implants used is the Depuy lateral fibular plate and screws and multiple (three) long 3.5 fully threaded syndesmosis screws, one 5.0 partially threaded cannulated screw medial malleolus. DRAINS:  None. ESTIMATED BLOOD LOSS:  Less than 10 mL. TOURNIQUET TIME:  100 minutes total at 300 mmHg, left thigh tourniquet. DISPOSITION:  Stable. INDICATIONS FOR PROCEDURE:  The patient has a displaced fracture of left lower extremity at ankle after a syncopal event as he fell, likely twisted his ankle and fell with full body weight on to a twisted ankle. He presented to the emergency room on 2022 and was noted to have an ankle fracture. Patient seen by the orthopedic trauma PA on-call and orthopedic surgeon on-call were informed of consultation regarding injury.  The patient was splinted and admitted by Medicine for evaluation of the syncopal episode. The on call Orthopaedic surgeon contacted me by text as I was out of area at the time of initial referral, and would informed I would not be able to see patient immediately until return. The patient if discharged was to follow up in my clinic and eventually schedule surgery as soon as possible pending skin okay. The patient was still inpatient so contacted a second time to take care of patient on more immediate basis as he was not able to be discharge. The patient has significant medical issues and work-up made for the syncopal episode. The patient was seen by me on day of planned procedure and he is provided information about the surgical plan, risks, potential complications, and expected outcomes. He has a high risk for complications such as wound dehiscence, wound infection, nonunion, malunion, failure of implants and reduction resulting in deformities, Charcot arthropathy, and deep infection that can lead to amputation. He is informed of all, he understands the risks and agrees to proceed. Surgery is recommended for a displaced fracture that appears unstable. All questions were answered, no guarantees were made, informed consent obtained. PROCEDURE IN DETAIL:  The patient was identified in the preop holding area. The left lower extremity was marked as a surgical site. The patient was again informed of the surgical plan, risks, potential complications, expected outcomes, postoperative limitation, time needed for recovery. The patient was administered regional pain block by Anesthesia staff. He was brought to the operating room, placed supine, administered general anesthesia. Once achieved, he was positioned on the operating room table, and due to body habitus, it took approximately one hour to position the patient, and stabilized on table for safe procedure and all areas well padded.   A well-padded tourniquet was applied around the thigh, and inserted a large ipsilateral hip bump to internally rotate his extremity for the surgical procedure as he has severe external rotation of the lower extremity. The splint was removed and it was noted, a medial wound measuring about 2 cm on the medial malleolus area, this does not appear to have been an open fracture, however, more likely a pressure wound from the splint. Unfortunately, the open wound can lead to infection. Currently, patient has no obvious signs of drainage or infection at this point. The ankle was unstable, falling into valgus easily and there is vascular disease noted based on the evaluation of the left lower extremity with reddish-brownish discoloration to the lower extremity \"sock-like\" distribution with \"woody\" appearance to the leg. Fortunately, the swelling was down and there was a positive wrinkle sign, so skin satisfactory and ready for surgical incision at the lateral ankle. After preparations were finally completed, the sterile prepping and draping completed, a time-out was called and documented. Patient was administered 3 g of IV Ancef. The leg is elevated and exsanguinated with an Esmarch, tourniquet inflated. Fluoroscopic evaluation shows an unstable fracture pattern with the ankle falling to valgus and after further evaluation fluoroscopically intraop, this fracture is a trimalleolar with a smaller, less than 25%, fracture fragment at the posterior malleolus. The incision was made laterally and the preparation for fracture repair was made at the lateral malleolus. The fracture was comminuted, the area of comminution was left in its positions without detaching or further mobilization other than for fracture reduction manipulation. The reduction achieved to improve the length and overall position of the fibula and once completed, held in place with temporary fixation using K-wires and also held with a clamp. This is a transverse comminuted type fracture that would not accommodate a lag screw.   A fibular spanning plate was applied, the plate was applied correct length selected and screws inserted proximal and distal initially using non-locking screws. In addition, locking screws used at the distal aspect and there as there is underlying osteopenia at the distal aspect more notable than the proximal.  The fixation was completed at the lateral malleolus after reduction achieved. The medial malleolus fracture was noted to be reduced after this fixation at the lateral side. The overall ankle mortise is good. I did insert three syndesmosis screws as the patient has underlying diabetes mellitus and to stabilize his ankle more firmly, three syndesmosis screws were placed across the tibiofibular joint. This syndesmosis fixation was completed after a stress evaluation during inversion and eversion of the ankle which confirms there is still some slight instability. A large clamp was used to clamp the tibiofibular joint together in normal position with an ankle mortise improvement, and holding this reduction with a clamp, the syndesmosis screws were all inserted and again as mentioned above, 3 screws were inserted for this patient. Once fixation was completed, the lateral view also confirms good position of the implants. Also noted the posterior malleolus fracture, which is otherwise well reduced and appears to be less than 25% of the articular surface based on the lateral view. After the fixation completed on the lateral side, copious irrigation of normal saline with gentamicin was done, completed multiple times and the wound was covered. We went to the medial side and made a percutaneous small incision and inserted a K-wire percutaneously from the medial malleolus into preparation for insertion of a 5.0 partially threaded cannulated screw. The pin fixation was completed initially to hold the fracture and the second pin for the fixation.   I was only able to place one screw into this area as the fracture was otherwise a smaller fracture. The fracture was well reduced after the lateral fixation and the percutaneous pin also held this in place in good reduction. The 5.0 partially-threaded screw was then inserted after preparations were made and length selected. Partially threaded screw was inserted and tightened and there was good position and satisfactory stability of the fracture medially. The ankle inversion stress evaluation under fluoroscopic views obtained again for final time, these views show good stability of the construct/repair. The wound on the inner side of the medial malleolus was then evaluated and it shows that there is pressure wound there, this area is cleaned and dried and debrided of any abnormal looking tissue and irrigated locally. Once completed, the small incisions made for the percutaneous fixation as well as the small incision made for the clamp used to reduce the syndesmosis were then closed with staples. The lateral side was then repaired, repairing the deep with 2-0 Monocryl and subcutaneously with a similar Monocryl. The skin was reapproximated well without tension and and the staples for the skin also provided good repair without tension. Once the surgical procedure was completed, the tourniquet was deflated, there were no significant bleeders found, there was satisfactory capillary refill to the area, although again noted there was vascular disease to the lower extremities. The patient's leg was cleaned and dried. Sterile dressings and well-padded splint were applied. The patient was then repositioned to supine position, tourniquet was removed, and he was transitioned from the operating room bed to the hospital bed and extubated. He was found stable and transferred to the recovery room in same stable condition. There were no surgical or anesthetic complications during this procedure. The procedure was delayed due to positioning of the patient prior to and after surgery.   All sponge and needle counts were correct at the end of the procedure.       Yaquelin Ty MD      [id number]  D:  09/21/2022 7:47  T:  09/21/2022 9:48  JOB #:  [id number]

## 2022-09-21 NOTE — ANESTHESIA POSTPROCEDURE EVALUATION
Procedure(s):  LEFT ANKLE OPEN REDUCTION INTERNAL FIXATION SMALL FRAG 3.5 CORTICAL SCREWS. general    Anesthesia Post Evaluation      Multimodal analgesia: multimodal analgesia not used between 6 hours prior to anesthesia start to PACU discharge  Patient location during evaluation: bedside  Patient participation: complete - patient participated  Level of consciousness: awake  Pain score: 0  Pain management: adequate  Airway patency: patent  Anesthetic complications: no  Cardiovascular status: acceptable  Respiratory status: acceptable  Hydration status: acceptable  Post anesthesia nausea and vomiting:  none  Final Post Anesthesia Temperature Assessment:  Normothermia (36.0-37.5 degrees C)      INITIAL Post-op Vital signs:   Vitals Value Taken Time   /65 09/20/22 2130   Temp 36.9 °C (98.5 °F) 09/20/22 2115   Pulse 92 09/20/22 2136   Resp 11 09/20/22 2136   SpO2 97 % 09/20/22 2136   Vitals shown include unvalidated device data.

## 2022-09-21 NOTE — PROGRESS NOTES
Discussed pt with RN, pt currently having difficulty following cues, increased pain med requests per RN and confused when attempting to perform tasks like phone use, call bell use. Pt will need to follow strict WB precautions, is not safe to mobilize, will attempt later once confusion and pain level decreases.      Dimas Freeman, DPT, PT

## 2022-09-21 NOTE — BRIEF OP NOTE
Brief Postoperative Note    Patient: Guille Dhaliwal  YOB: 1960  MRN: [id number]    Date of Procedure: 9/20/2022     Pre-Op Diagnosis: LEFT ANKLE FRACTURE    Post-Op Diagnosis: 1. LEFT ANKLE TRIMALLEOLAR FRACTURE,DISPLACED, ? OPEN FRACTURE MEDIAL MALLEOLUS VERSUS SKIN BREAKDOWN DUE TO PRESSURE FROM FRACTURE MEDIAL MALLEOLUS. 2. DIABETES WITH RENAL FAILURE, PERIPHERAL NEUROPATHY, VASCULAR DISEASE. 3. MORBID OBESITY. Procedure(s):  LEFT ANKLE TRIMALLEOLAR OPEN REDUCTION INTERNAL FIXATION WITH SYNDESMOSIS FIXATION. 2.  IRRIGATION AND DEBRIDEMENT OF OPEN SKIN WOUND MEDIAL LEFT ANKLE. Surgeon(s):  Marcos To MD    Surgical Assistant: None    Anesthesia: General     Estimated Blood Loss (mL): less than 50     Complications: None    Specimens: * No specimens in log *     Implants:   Implant Name Type Inv.  Item Serial No.  Lot No. LRB No. Used Action   PLATE BNE [id number] 14 H L FIBULAR KELLEY REHANA COMPR LANG FOR - SNA  PLATE BNE [id number] 14 H L FIBULAR KELLEY REHANA COMPR LANG FOR NA [employer] TRAUMA_WD NA Left 1 Implanted   SCREW BNE L14MM DIA3.5MM NICOLÁS DST TIB TI NONCANNULATED - SNA  SCREW BNE L14MM DIA3.5MM NICOLÁS DST TIB TI NONCANNULATED NA [employer] TRAUMA_ NA Left 2 Implanted   SCREW BNE L16MM DIA3.5MM NICOLÁS DST TIB TI NONCANNULATED - SNA  SCREW BNE L16MM DIA3.5MM NICOLÁS DST TIB TI NONCANNULATED NA [employer] TRAUMA_ NA Left 1 Implanted   SCREW BNE L18MM DIA4MM DST CANC TIB TI NONCANNULATED - SNA  SCREW BNE L18MM DIA4MM DST CANC TIB TI NONCANNULATED NA [employer] TRAUMA_ NA Left 1 Implanted   SCREW BNE L18MM DIA4MM STD CANC TI ST REHANA FULL THRD BLNT SM - SNA  SCREW BNE L18MM DIA4MM STD CANC TI ST REHANA FULL THRD BLNT SM NA [employer] TRAUMA_WD NA Left 2 Implanted   SCREW BNE L16MM DIA4MM STD CANC TI ST REHANA FULL THRD BLNT SM - SNA  SCREW BNE L16MM DIA4MM STD CANC TI ST REHANA FULL THRD BLNT SM NA [employer] TRAUMA_WD NA Left 2 Implanted   SCREW BNE L18MM DIA3.5MM NICOLÁS DST TIB TI NONCANNULATED - SNA  SCREW BNE L18MM DIA3.5MM NICOLÁS DST TIB TI NONCANNULATED NA [employer] TRAUMA_WD NA Left 2 Implanted   SCREW BONE L52MM DIA3. 5MM PROX HUM NONLOCKING T15 LO PROF - SNA  SCREW BONE L52MM DIA3. 5MM PROX HUM NONLOCKING T15 LO PROF NA [employer] TRAUMA_WD NA Left 1 Implanted   SCREW BONE L48MM DIA3. 5MM PROX HUM NONLOCKING LO PROF T15 - SNA  SCREW BONE L48MM DIA3. 5MM PROX HUM NONLOCKING LO PROF T15 NA FANNY [employer]ET TRAUMA_WD NA Left 1 Implanted   SCREW BNE VIDA SHT THRD 5X60 MM HD - SNA  SCREW BNE VIDA SHT THRD 5X60 MM HD NA [employer]_WD NA Left 1 Implanted       Drains: * No LDAs found *    Findings: There is a medial skin wound near open to fracture with bleeding from site and skin slough over medial malleolus. Appears due to persistent valgus ankle and loss of reduction in splint applied. There is poor skin quality related to underlying vascular disease with reddish discoloration to leg and woody appearance to skin. There is persistent bruising around ankle. Skin swelling improved with +wrinkle sign.     Electronically Signed by Lowanda Schilder, MD on 9/20/2022 at 8:27 PM

## 2022-09-21 NOTE — PROCEDURES
Hemodialysis / [phone]    Vitals Pre Post Assessment Pre Post   /56 123/52 LOC A/O x 3 A/O x 3   HR 81 93 Lungs Diminished/ [email or web address] [place]. Denies SOB O2 at 1L/min NC   Resp 16 16 Cardiac HRR HRR   Temp 99.3 97.8 Skin WDI-has clean drsg on Left ankle/ foot WDI   Weight 138 kg 134 kg Edema Obesity-gen puffiness Gen puffiness   Tele status remote remote Pain Left foot pain 10/10-got morphine by primary RN \"Lt foot pain 20:10\"  primary RN working on higher dose of morphine     Orders   Duration: Start: 0810 End: 1210 Total: 4 hrs   Dialyzer: Dialyzer/Set Up Inspection: Rhys Matias ([id number]/ [id number]) (09/21/22 0800)   Stephen Barnacle Bath: Dialysate K (mEq/L): 2 (09/21/22 0800)   Ca Bath: Dialysate CA (mEq/L): 2.5 (09/21/22 0800)   Na: Dialysate NA (mEq/L): 138 (09/21/22 0800)   Bicarb: Dialysate HCO3 (mEq/L): 35 (09/21/22 0800)   Target Fluid Removal: Goal/Amount of Fluid to Remove (mL): 4000 mL (09/21/22 0800)     Access   Type & Location: Rt neck cath   Comments:   CHG drsg changed after cleaning insertion site w/ alcohol and CHG. No redness or drainage noted. Ports cleaned w/ alcohol and CHG. +aspir/ NS flushes.                                      Labs   HBsAg (Antigen) / date:      Neg 9/16/22                                         HBsAb (Antibody) / date: Imm 9/16/22   Source: EPIC   Obtained/Reviewed  Critical Results Called HGB   Date Value Ref Range Status   09/20/2022 10.9 (L) 12.1 - 17.0 g/dL Final     Potassium   Date Value Ref Range Status   09/20/2022 4.6 3.5 - 5.1 mmol/L Final     Calcium   Date Value Ref Range Status   09/20/2022 10.2 (H) 8.5 - 10.1 MG/DL Final     BUN   Date Value Ref Range Status   09/20/2022 41 (H) 6 - 20 MG/DL Final     Comment:     INVESTIGATED PER DELTA CHECK PROTOCOL     Creatinine   Date Value Ref Range Status   09/20/2022 4.98 (H) 0.70 - 1.30 MG/DL Final     Comment:     INVESTIGATED PER DELTA CHECK PROTOCOL        Meds Given   Name Dose Route   Heparin 2100 units ICD red port   Heparin 2200 units ICD blue port          Adequacy / Fluid    Total Liters Process: 80L   Net Fluid Removed: 4000 ml      Comments   Time Out Done:   (Time) 0805 mjb   Admitting Diagnosis: Syncope/ fx'd left ankle   Consent obtained/signed: Informed Consent Verified:  (Continuation of clinic care) (09/21/22 0800)   Machine / RO # Machine Number: [id number]/ [id number] (09/21/22 0800)   Primary Nurse Rpt Pre: Ajith Gan RN   Primary Nurse Rpt Post: Suze Abbasi RN   Pt Education: Discussed procedure   Care Plan: Continue to do HD txs as ordered   Pts outpatient clinic: [facility]--M[facility]     Tx Summary   Comments: Tolerated tx well. At end, blood in circuit returned w/ 300ml NS. Ports cleaned w/ alcohol, flushed w/ NS and dwelled w/ heparin. Sterile Q-syte/ PureHub caps applied. CHG drsg CDI. Off 10 mins early, as pt having SEVERE pain in left ankle. Ortho staff here-increasing dose now. Intravascular pressures too high to maintain HD when he bears down in pain. He stated \"I'm gonna throw up, I'm falling and I can't breathe\" several times. Returned to 550 by hosp staff.

## 2022-09-21 NOTE — PROGRESS NOTES
Problem: Falls - Risk of  Goal: *Absence of Falls  Description: Document Jayden Kebede Fall Risk and appropriate interventions in the flowsheet.   Outcome: Progressing Towards Goal  Note: Fall Risk Interventions:  Mobility Interventions: Communicate number of staff needed for ambulation/transfer    Mentation Interventions: Adequate sleep, hydration, pain control    Medication Interventions: Evaluate medications/consider consulting pharmacy    Elimination Interventions: Patient to call for help with toileting needs    History of Falls Interventions: Bed/chair exit alarm         Problem: Patient Education: Go to Patient Education Activity  Goal: Patient/Family Education  Outcome: Progressing Towards Goal

## 2022-09-21 NOTE — PROGRESS NOTES
Ortho Daily Progress Note      Patient: Indu Stewart                   MRN: [id number]  Sex: male  YOB: 1960           Age: 58 y.o.    1 Day Post-Op    Procedure(s): LEFT ANKLE OPEN REDUCTION INTERNAL FIXATION    Subjective: moaning in pain     Visit Vitals  BP (!) 126/57   Pulse 87   Temp 99.3 °F (37.4 °C)   Resp 16   Ht 6' (1.829 m)   Wt 136.6 kg (301 lb 2.4 oz)   SpO2 90%   BMI 40.84 kg/m²        Lab Results:  HGB   Date/Time Value Ref Range Status   09/20/2022 12:56 AM 10.9 (L) 12.1 - 17.0 g/dL Final       Physical Exam:  GENERAL: 63yo male, awake, moaning in pain  DRESSING:  left short leg splint in place and in good condition  WOUND: splint not removed to examine incisions  SWELLING: mild swelling in toes as expected  NEUROLOGICAL: unable/unwilling to move toes  VASCULAR: brisk capillary refill in toes            Plan:    Strict ice/elevation left leg for edema control  DVT prophylaxisHeparin  Weight bearing restriction NWB left leg x 3 months  Pain Control:increasing significant pain , ordered morphine 4mg q 3 hours  Dispo: anticipate he will need SNF placement  Needs close follow-up with Dr. Beverly Blandon within 1 week of discharge    [address] [place] [address]  9/21/2022   11:53 AM      .

## 2022-09-21 NOTE — PROGRESS NOTES
Nephrology Progress Note  Sydney Klein  Date of Admission : 9/16/2022    CC:  Follow up for ESRD       Assessment and Plan     ESRD on HD:  - MWF at [facility]  - HD now    Volume overload:  - improving    HTN:  - cont current meds    Anemia of CKD:  - hold ADONIS for now  - CBC today    Secondary HPT    L distal fibula fracture:  - s/p ORIF 9/20    DM2  Obesity  Noncompliance       Interval History:  Seen and examined on dialysis. Sleepy. Just received pain meds. No cp. Denies cp or sob. BP holding stable    Current Medications: all current  Medications have been eviewed in EPIC  Review of Systems: Pertinent items are noted in HPI. Objective:  Vitals:    Vitals:    09/21/22 0810 09/21/22 0830 09/21/22 0845 09/21/22 0900   BP: (!) 134/56 (!) 135/53 (!) 144/53 129/62   Pulse: 76 78 77 82   Resp: 16 16 16 16   Temp:       SpO2:       Weight:       Height:         Intake and Output:  No intake/output data recorded. 09/19 1901 - 09/21 0700  In: 540 [P.O.:240; I.V.:300]  Out: 400 [Urine:350]    Physical Examination:    General: NAD,Conversant   Neck:  Supple, no mass  Resp:  Lungs CTA B/L, no wheezing , normal respiratory effort  CV:  RRR,  no murmur or rub, 2+ LE edema  GI:  Soft, NT, + Bowel sounds, no hepatosplenomegaly  Neurologic:  Non focal  Psych:             AAO x 3 appropriate affect  Skin:  No Rash  Access:           RIJ PC c/d/i    []    High complexity decision making was performed  []    Patient is at high-risk of decompensation with multiple organ involvement    Lab Data Personally Reviewed: I have reviewed all the pertinent labs, microbiology data and radiology studies during assessment.     Recent Labs     09/20/22 0056 09/19/22  0322   * 130*   K 4.6 4.2   CL 98 94*   CO2 27 24   * 234*   BUN 41* 69*   CREA 4.98* 6.98*   CA 10.2* 9.8   PHOS 7.3* 8.9*   ALB 2.7* 2.8*       Recent Labs     09/20/22  0056   WBC 8.7   HGB 10.9*   HCT 31.3*          No results found for: SDES  No results found for: CULT  Recent Results (from the past 24 hour(s))   GLUCOSE, POC    Collection Time: 09/20/22 11:44 AM   Result Value Ref Range    Glucose (POC) 130 (H) 65 - 117 mg/dL    Performed by Navi Patel, POC    Collection Time: 09/20/22  8:35 PM   Result Value Ref Range    Glucose (POC) 155 (H) 65 - 117 mg/dL    Performed by [facility], POC    Collection Time: 09/20/22 10:29 PM   Result Value Ref Range    Glucose (POC) 212 (H) 65 - 117 mg/dL    Performed by [facility], POC    Collection Time: 09/21/22  6:54 AM   Result Value Ref Range    Glucose (POC) 160 (H) 65 - 117 mg/dL    Performed by Lionel Card MD  [address]  Phone - [phone]   Fax - [phone]  [email or web address]

## 2022-09-21 NOTE — PROGRESS NOTES
10:26 AM.   Chart reviewed. Pt POD#1 s/p open reduction/internal fixator L ankle. Pt currently in dialysis. Will follow-up later today if able or when pt is medically ready to participate with OT. 13:04 PM  Nursing requested to hold therapy at this time d/t increase confusion and inability to follow commands.   Will follow-up with pt tomorrow for OT eval.

## 2022-09-22 LAB
GLUCOSE BLD STRIP.AUTO-MCNC: 140 MG/DL (ref 65–117)
GLUCOSE BLD STRIP.AUTO-MCNC: 150 MG/DL (ref 65–117)
GLUCOSE BLD STRIP.AUTO-MCNC: 161 MG/DL (ref 65–117)
GLUCOSE BLD STRIP.AUTO-MCNC: 219 MG/DL (ref 65–117)
SERVICE CMNT-IMP: ABNORMAL

## 2022-09-22 PROCEDURE — 97535 SELF CARE MNGMENT TRAINING: CPT

## 2022-09-22 PROCEDURE — 74011000250 HC RX REV CODE- 250: Performed by: ORTHOPAEDIC SURGERY

## 2022-09-22 PROCEDURE — 82962 GLUCOSE BLOOD TEST: CPT

## 2022-09-22 PROCEDURE — 97530 THERAPEUTIC ACTIVITIES: CPT

## 2022-09-22 PROCEDURE — 65270000029 HC RM PRIVATE

## 2022-09-22 PROCEDURE — 74011250637 HC RX REV CODE- 250/637: Performed by: ORTHOPAEDIC SURGERY

## 2022-09-22 PROCEDURE — 74011250636 HC RX REV CODE- 250/636: Performed by: ORTHOPAEDIC SURGERY

## 2022-09-22 PROCEDURE — 97161 PT EVAL LOW COMPLEX 20 MIN: CPT

## 2022-09-22 PROCEDURE — 74011636637 HC RX REV CODE- 636/637: Performed by: ORTHOPAEDIC SURGERY

## 2022-09-22 PROCEDURE — 97165 OT EVAL LOW COMPLEX 30 MIN: CPT

## 2022-09-22 PROCEDURE — 77010033678 HC OXYGEN DAILY

## 2022-09-22 PROCEDURE — 51798 US URINE CAPACITY MEASURE: CPT

## 2022-09-22 RX ADMIN — HEPARIN SODIUM 5000 UNITS: 5000 INJECTION, SOLUTION INTRAVENOUS; SUBCUTANEOUS at 13:44

## 2022-09-22 RX ADMIN — SODIUM CHLORIDE, PRESERVATIVE FREE 10 ML: 5 INJECTION INTRAVENOUS at 13:44

## 2022-09-22 RX ADMIN — HEPARIN SODIUM 5000 UNITS: 5000 INJECTION, SOLUTION INTRAVENOUS; SUBCUTANEOUS at 21:36

## 2022-09-22 RX ADMIN — OXYCODONE 5 MG: 5 TABLET ORAL at 09:49

## 2022-09-22 RX ADMIN — SODIUM CHLORIDE, PRESERVATIVE FREE 10 ML: 5 INJECTION INTRAVENOUS at 06:00

## 2022-09-22 RX ADMIN — LOSARTAN POTASSIUM 100 MG: 50 TABLET, FILM COATED ORAL at 09:49

## 2022-09-22 RX ADMIN — Medication 10 UNITS: at 06:24

## 2022-09-22 RX ADMIN — Medication 2 UNITS: at 17:27

## 2022-09-22 RX ADMIN — FUROSEMIDE 80 MG: 40 TABLET ORAL at 17:27

## 2022-09-22 RX ADMIN — Medication 2 UNITS: at 06:24

## 2022-09-22 RX ADMIN — ACETAMINOPHEN 1000 MG: 500 TABLET, FILM COATED ORAL at 09:49

## 2022-09-22 RX ADMIN — ROSUVASTATIN CALCIUM 20 MG: 10 TABLET, FILM COATED ORAL at 21:36

## 2022-09-22 RX ADMIN — FUROSEMIDE 80 MG: 40 TABLET ORAL at 09:49

## 2022-09-22 RX ADMIN — HEPARIN SODIUM 5000 UNITS: 5000 INJECTION, SOLUTION INTRAVENOUS; SUBCUTANEOUS at 06:13

## 2022-09-22 RX ADMIN — ACETAMINOPHEN 1000 MG: 500 TABLET, FILM COATED ORAL at 17:27

## 2022-09-22 RX ADMIN — Medication 3 UNITS: at 11:32

## 2022-09-22 RX ADMIN — Medication 10 UNITS: at 17:27

## 2022-09-22 RX ADMIN — SODIUM CHLORIDE, PRESERVATIVE FREE 10 ML: 5 INJECTION INTRAVENOUS at 21:37

## 2022-09-22 RX ADMIN — Medication 10 UNITS: at 11:32

## 2022-09-22 RX ADMIN — SODIUM CHLORIDE, PRESERVATIVE FREE 10 ML: 5 INJECTION INTRAVENOUS at 06:13

## 2022-09-22 RX ADMIN — DOCUSATE SODIUM 50MG AND SENNOSIDES 8.6MG 1 TABLET: 8.6; 5 TABLET, FILM COATED ORAL at 09:49

## 2022-09-22 RX ADMIN — INSULIN GLARGINE 40 UNITS: 100 INJECTION, SOLUTION SUBCUTANEOUS at 21:35

## 2022-09-22 RX ADMIN — OXYCODONE 5 MG: 5 TABLET ORAL at 17:26

## 2022-09-22 RX ADMIN — GABAPENTIN 300 MG: 300 CAPSULE ORAL at 17:26

## 2022-09-22 NOTE — PROGRESS NOTES
Problem: Mobility Impaired (Adult and Pediatric)  Goal: *Acute Goals and Plan of Care (Insert Text)  Description: Description: FUNCTIONAL STATUS PRIOR TO ADMISSION: Patient was modified independent using a rollator for functional mobility. HOME SUPPORT: The patient lived with spouse and required some assistance with self-care tasks. Physical Therapy Goals  Initiated 9/22/2022  1. Patient will move from supine to sit and sit to supine  in bed with supervision/set-up within 7 day(s). 2.  Patient will transfer from bed to chair and chair to bed with minimal assistance/contact guard assist using the least restrictive device within 7 day(s). 3.  Patient will perform sit to stand with moderate assistance  within 7 day(s). 4.  Patient will perform BLE therex (as appropriate) for strengthening with supervision within 7 day(s). Outcome: Progressing Towards Goal   PHYSICAL THERAPY EVALUATION  Patient: Kandice Lorenz (18 y.o. male)  Date: 9/22/2022  Primary Diagnosis: Syncope [R55]  Bimalleolar fracture of left ankle [S82.842A]  Procedure(s) (LRB):  LEFT ANKLE OPEN REDUCTION INTERNAL FIXATION SMALL FRAG 3.5 CORTICAL SCREWS (Left) 2 Days Post-Op   Precautions: Anxiety,  NWB, Fall (LLE)    ASSESSMENT  Pt seen this morning with OT following RN clearance. Based on the objective data described below, the patient presents with increased LLE pain following ORIF. He was agreeable to bed mobility and transfer attempts (both standing at RW with LLE NWB and lateral transfer to the chair on L). Following assist to the chair and end of session, student RN called therapists back to room as pt appeared \"pale. \" BP checked and noted to have dropped into 90's. Pt then assisted back to bed via lateral transfer to the R (drop arm recliner). Pt's RN called for assessment and pt left in NAD with medical support. Pt is motivated though shows signs (and verbalizes) challenges with anxiety.  Pt may benefit from coordination of medication with therapy sessions (pain and otherwise). Pt would benefit from intensive 3 hrs therapy per day pending continued progress for optimal recovery to baseline status. Next session: lateral transfer vs sliding board transfer to drop arm chair; BLE therex for strengthening and ROM (except L ankle)      Current Level of Function Impacting Discharge (mobility/balance): upwards of maxA    Functional Outcome Measure: The patient scored 1 on the Tinetti outcome measure which is indicative of high fall risk. Other factors to consider for discharge: supportive spouse per report     Patient will benefit from skilled therapy intervention to address the above noted impairments. PLAN :  Recommendations and Planned Interventions: bed mobility training, transfer training, therapeutic exercises, modalities, edema management/control, patient and family training/education, and therapeutic activities      Frequency/Duration: Patient will be followed by physical therapy:  5 times a week to address goals. Recommendation for discharge: (in order for the patient to meet his/her long term goals)  Therapy 3 hours per day 5-7 days per week    This discharge recommendation:  A follow-up discussion with the attending provider and/or case management is planned    IF patient discharges home will need the following DME: to be determined (TBD)         SUBJECTIVE:   Patient stated Sometimes when the pain is bad, I feel like I'm going to throw up. ..    OBJECTIVE DATA SUMMARY:   HISTORY:    Past Medical History:   Diagnosis Date    Anemia     Chronic kidney disease     Diabetes ([facility] [address]     Dorsalgia     ESRD on hemodialysis ([facility] [address]     Gastro-esophageal reflux     Heart failure (HCC)     Hypertension     Obesity     Polyneuropathy     Sciatica    History reviewed. No pertinent surgical history.     Personal factors and/or comorbidities impacting plan of care: obesity; neuropathy    Home Situation  Home Environment: Independent living [facility])  # Steps to Enter: 0  One/Two Story Residence: One story  Living Alone: No  Support Systems: Spouse/Significant Other  Patient Expects to be Discharged to[de-identified] Skilled nursing facility  Current DME Used/Available at Home: attila Szymanski    EXAMINATION/PRESENTATION/DECISION MAKING:   Critical Behavior:  Neurologic State: Alert, Drowsy  Orientation Level: Oriented X4  Cognition: Follows commands     Hearing: Auditory  Auditory Impairment: None  Range Of Motion:  AROM: Generally decreased, functional                       Strength:    Strength: Generally decreased, functional                    Tone & Sensation:   Tone: Normal              Sensation:  (NT)               Coordination:  Coordination: Within functional limits    Functional Mobility:  Bed Mobility:   Supine to sit: mod A x2-maxA; HOB elevated and HHA provided for pull up and LLE support for NWB/comfort  Sit to supine: maxA  Transfers:  Sit to Stand: Maximum assistance;Assist x1;Assist x2 (EOB elevated, assist x2 for stance and x1 for LLE elevation needed)  Stand to Sit: Moderate assistance; Additional time;Assist x1;Assist x2 (LLE support needed)        Bed to Chair: Moderate assistance;Assist x2; Other (comment) (lateral transfer to drop arm recliner chair)  Lateral Transfers: Moderate assistance           Balance:   Sitting: Intact  Standing: Impaired; With support  Standing - Static: Poor;Constant support  Standing - Dynamic : Not tested    Functional Measure:  Tinetti test:    Sitting Balance: 1  Arises: 0  Attempts to Rise: 0  Immediate Standing Balance: 0  Standing Balance: 0  Nudged: 0  Eyes Closed: 0  Turn 360 Degrees - Continuous/Discontinuous: 0  Turn 360 Degrees - Steady/Unsteady: 0  Sitting Down: 0  Balance Score: 1 Balance total score  Indication of Gait: 0  R Step Length/Height: 0  L Step Length/Height: 0  R Foot Clearance: 0  L Foot Clearance: 0  Step Symmetry: 0  Step Continuity: 0  Path: 0  Trunk: 0  Walking Time: 0  Gait Score: 0 Gait total score  Total Score: 1/28 Overall total score         Tinetti Tool Score Risk of Falls  <19 = High Fall Risk  19-24 = Moderate Fall Risk  25-28 = Low Fall Risk  Bran MOSER. Performance-Oriented Assessment of Mobility Problems in Elderly Patients. [facility] 66; [id number]. (Scoring Description: PT Bulletin Feb. 10, 1993)    Older adults: Miryam Brennan et al, 2009; n = 1000 [facility] elderly evaluated with ABC, ALLISON, ADL, and IADL)  · Mean ALLISON score for males aged 69-68 years = 26.21(3.40)  · Mean ALLISON score for females age 69-68 years = 25.16(4.30)  · Mean ALLISON score for males over 80 years = 23.29(6.02)  · Mean ALLISON score for females over 80 years = 17.20(8.32)         Pain Rating:  Pain is elevated with dependent position     Activity Tolerance:   Fair, SpO2 stable on RA, requires rest breaks, and signs and symptoms of orthostatic hypotension    After treatment patient left in no apparent distress:   Supine in bed, Heels elevated for pressure relief, Call bell within reach, Side rails x 3, and RN present    COMMUNICATION/EDUCATION:   The patients plan of care was discussed with: Occupational therapist and Registered nurse. Fall prevention education was provided and the patient/caregiver indicated understanding., Patient/family have participated as able in goal setting and plan of care. , and Patient/family agree to work toward stated goals and plan of care.     Thank you for this referral.  Zahraa Oar   Time Calculation: 45 mins

## 2022-09-22 NOTE — PROGRESS NOTES
Physical Therapy    Orders received, chart reviewed and patient evaluated by physical therapy. Pending progression with skilled acute physical therapy, recommend:  Therapy 3 hours per day 5-7 days per week     Recommend with nursing OOB to chair 3x/day with leonardo lift and manual LLE support with staff (will work on functional transfers with therapy). Thank you for completing as able in order to maintain patient strength, endurance and independence. Full evaluation to follow.       Rose Almanza, PT, DPT

## 2022-09-22 NOTE — PROGRESS NOTES
Ortho Daily Progress Note      Patient: Jhonny Antonio                   MRN: [id number]  Sex: male  YOB: 1960           Age: 58 y.o.    2 Days Post-Op    Procedure(s):  LEFT ANKLE OPEN REDUCTION INTERNAL FIXATION SMALL FRAG 3.5 CORTICAL SCREWS    Subjective: Still with significant pain but better controlled today     Visit Vitals  /62 (BP 1 Location: Right upper arm, BP Patient Position: At rest)   Pulse 88   Temp 99.1 °F (37.3 °C)   Resp 30   Ht 6' (1.829 m)   Wt 135.3 kg (298 lb 4.5 oz)   SpO2 91%   BMI 40.45 kg/m²        Lab Results:  HGB   Date/Time Value Ref Range Status   09/20/2022 12:56 AM 10.9 (L) 12.1 - 17.0 g/dL Final       Physical Exam:    65yo male, much more alert today, pain still present but improved, splint in well padded and not too tight, proximal calf swollen but soft, moves toes with encouragement, brisk capillary refill    Plan:    Strict ice/elevation left leg for edema control  DVT prophylaxisHeparin  Weight bearing restriction NWB left leg x 3 months  Pain Control: Morphine 4mg q 3 hours seems to have been beneficial as pain control improved  Dispo: anticipate he will need SNF placement  Needs close follow-up with Dr. Ayana Urrutia within 1 week of discharge  OK for discharge to SNF from ortho standpoint when otherwise stable    ANJANA Dubose  9/22/2022   11:39 AM      .

## 2022-09-22 NOTE — PROGRESS NOTES
Spiritual Care Partner Volunteer visited patient at . [facility] [address] in [address] [place] [address] on 9/21/22   Documented by:  Azam Whitaker  [phone]

## 2022-09-22 NOTE — PROGRESS NOTES
Attending Note   Pt seen and examined independently by me and agree with assessment and plan as outlined by Ms Jose Vila. Pt need to continue diuretics for management of chronic excess interdialytic fluid gains. Agree w/ saldivar removal. Dialysis can be continued at [facility] on MWF schedule     Jessie Justin MD        Nephrology Progress Note  Gerardo Chandra  Date of Admission : 9/16/2022    CC:  Follow up for ESRD       Assessment and Plan     ESRD on HD:  - MWF at [facility]  - 3.6 kg removed yesterday stopped early due to foot pain  - plan to remove saldivar and stable for d/c from [facility]  - Plans to transfer to SNF per notes: if discharged Friday he will need HD prior    Volume overload:  - improving attempting to pull 4-4.5 kg each HD    HTN:  - cont current meds    Anemia of CKD:  - Hgb stable without ADONIS can resume outpatient has his HD clinic      Secondary HPT    L distal fibula fracture:  - s/p ORIF 9/20    DM2  Obesity  Noncompliance       Interval History:  Seen and examined on dialysis. Alert and oriented. Pain controlled. No cp. Denies cp or sob. BP holding stable    Current Medications: all current  Medications have been eviewed in EPIC  Review of Systems: Pertinent items are noted in HPI. Objective:  Vitals:    Vitals:    09/22/22 0229 09/22/22 0534 09/22/22 0818 09/22/22 0902   BP: 119/60  119/60 (!) 144/66   Pulse: 81  85 75   Resp: 18  14    Temp: 98.3 °F (36.8 °C)  98.1 °F (36.7 °C)    SpO2: 92%  94% 94%   Weight:  135.3 kg (298 lb 4.5 oz)     Height:  6' (1.829 m)       Intake and Output:  No intake/output data recorded.   09/20 1901 - 09/22 0700  In: 300 [I.V.:300]  Out: 3800 [Urine:150]    Physical Examination:    General: NAD,Conversant   Neck:  Supple, no mass  Resp:  Lungs CTA B/L, no wheezing , normal respiratory effort  CV:  RRR,  no murmur or rub, 2+ LE edema  GI:  Soft, NT, + Bowel sounds, no hepatosplenomegaly  Neurologic:  Non focal  Psych: AAO x 3 appropriate affect  Skin:  No Rash- left foot splint dressing D/I  Access:           RIJ PC c/d/i    []    High complexity decision making was performed  []    Patient is at high-risk of decompensation with multiple organ involvement    Lab Data Personally Reviewed: I have reviewed all the pertinent labs, microbiology data and radiology studies during assessment. Recent Labs     09/20/22  0056   *   K 4.6   CL 98   CO2 27   *   BUN 41*   CREA 4.98*   CA 10.2*   PHOS 7.3*   ALB 2.7*       Recent Labs     09/20/22  0056   WBC 8.7   HGB 10.9*   HCT 31.3*          No results found for: SDES  No results found for: CULT  Recent Results (from the past 24 hour(s))   GLUCOSE, POC    Collection Time: 09/21/22  4:30 PM   Result Value Ref Range    Glucose (POC) 186 (H) 65 - 117 mg/dL    Performed by Katie Cowan    GLUCOSE, POC    Collection Time: 09/21/22  9:18 PM   Result Value Ref Range    Glucose (POC) 119 (H) 65 - 117 mg/dL    Performed by Andrey Hill    GLUCOSE, POC    Collection Time: 09/22/22  6:10 AM   Result Value Ref Range    Glucose (POC) 140 (H) 65 - 117 mg/dL    Performed by Daniel Landaverde NP  [address]  Phone - [phone]   Fax - [phone]  [email or web address]

## 2022-09-22 NOTE — PROGRESS NOTES
Problem: Falls - Risk of  Goal: *Absence of Falls  Description: Document Gen Martini Fall Risk and appropriate interventions in the flowsheet.   Outcome: Progressing Towards Goal  Note: Fall Risk Interventions:  Mobility Interventions: Communicate number of staff needed for ambulation/transfer    Mentation Interventions: Adequate sleep, hydration, pain control    Medication Interventions: Evaluate medications/consider consulting pharmacy    Elimination Interventions: Patient to call for help with toileting needs    History of Falls Interventions: Bed/chair exit alarm         Problem: Patient Education: Go to Patient Education Activity  Goal: Patient/Family Education  Outcome: Progressing Towards Goal     Problem: Pain  Goal: *Control of Pain  Outcome: Progressing Towards Goal

## 2022-09-22 NOTE — PROGRESS NOTES
Transition of Care Plan  RUR- Low  14%   DISPOSITION: The disposition plan is IPR vs. SNF; pending medical progression  SNF Pending:  [facility]   IPR:  [facility]  Dialysis: MWF at 12:30pm [facility], [facility]  F/U with PCP/Specialist    Transport: [facility]   Barrier: AUTH needed, Transport to [employer], Placement       Transition of Care Plan:     The Plan for Transition of Care is related to the following treatment goals: SNF    The Patient was provided with a choice of provider and agrees  with the discharge plan. Yes [x] No []    A Freedom of choice list was provided with basic dialogue that supports the patient's individualized plan of care/goals and shares the quality data associated with the providers.        Yes [x] No []      CM: [address] [place],   [phone]

## 2022-09-22 NOTE — PROGRESS NOTES
[facility] Adult  Hospitalist Group                                                                                          Hospitalist Progress Note  Paula Gr MD  Answering service: [phone] from in house phone        Date of Service:  2022  NAME:  Rob Hernandez  :  1960  MRN:  [id number]      Admission Summary:   Rob Hernandez is a 58 y.o. male who presents with fall and ankle fracture. Patient has past medical history of end-stage renal disease on hemodialysis. He said he struck his head he complains of left ankle injury. There was an injury over the medial malleolus . Right now patient complains of severe ankle pain was sedated for left ankle stabilization. Denies any chest pain, shortness of breath, fevers or recent illnesses. No headache. He does receive heparin for dialysis. He was due for dialysis today and typically receives it  and Friday. Last dialysis was Wednesday. He sees Dr. Fatimah Galvez. Nephrology consulted for resuming hemodialysis     Interval history / Subjective:     Patient is seen and examined , d/w CM / RN  Ready for d/c to SNF        Assessment & Plan:     Left ankle dislocation, left distal fibula fracture s/p mechanical fall  S/p Closed reduction in ED  ORIF on 20  Per ortho: Ice/elevate;  If he is physically able, may work on bed to chair transfers with PT; strict NWB LLE  Pain control   Mild leukocytosis likely reactive 2/2 this- resolved     ESRD on HD MWF anemia of CKD   MWF at [facility]  Nephrology following  D/c yariel unclear why on lasix 80 bid will d/w nephrology     Volume overload:  improving attempting to pull 4-4.5 kg each HD    Panic attacks, anxiety   Likely 2/2 the abrupt discontinuation of chronic gabapentin which can cause irritability, tachycardia, diaphoresis (ie anxiety), therefore resumed gabapentin at 300 bid for now     Mechanical fall, \"legs buckled\"  Denies any pre-syncopal symptoms  CT head no acute abnormality  Carotid Doppler w/o b/l ICA stenosis, VA s patent,   Incidental large left thyroid nodule - f/up PCP for thyroid US [facility]      HTN  Cont home ARB  IV PRN    DM2, a1c 12.8, uncontrolled  SSI, Lantus, Humalog- cont to adjust PRN    H/o hypothyroidism  TSH low at 0.25  Holding Levothyroxine    Hyponatremia  Likely SIADH from ankle fracture, stress, pain, anxiety  Resolved     Code status: Full Code  Prophylaxis: Heparin ppx  Care Plan discussed with: Patient/Family, Nurse, and   Anticipated Disposition: Home  Anticipated Discharge:  24 hrs      Hospital Problems  Never Reviewed            Codes Class Noted POA    Bimalleolar fracture of left ankle ICD-10-CM: D34.942X  ICD-9-CM: 824.4  9/17/2022 Unknown        Syncope ICD-10-CM: R55  ICD-9-CM: 780.2  9/16/2022 Unknown       Review of Systems:   Pertinent items are noted in HPI    Vital Signs:    Last 24hrs VS reviewed since prior progress note. Most recent are:  Visit Vitals  BP (!) 144/66 (BP 1 Location: Right upper arm, BP Patient Position: At rest;Semi fowlers)   Pulse 75   Temp 98.1 °F (36.7 °C)   Resp 14   Ht 6' (1.829 m)   Wt 135.3 kg (298 lb 4.5 oz)   SpO2 94%   BMI 40.45 kg/m²         Intake/Output Summary (Last 24 hours) at 9/22/2022 0913  Last data filed at 9/21/2022 1200  Gross per 24 hour   Intake --   Output 3600 ml   Net -3600 ml          Physical Examination:   I had a face to face encounter with this patient and independently examined them on 9/22/2022 as outlined below:    General: Alert, cooperative, no acute distress    EENT:  EOMI. Anicteric sclerae. MMM  Resp:  CTA bilaterally, no wheezing or rales. No accessory muscle use  CV:  RRR, No audible murmur  GI:  Soft, Non distended, Non tender  Neurologic:  Alert and oriented, normal speech, E[place]ORADO  Extremities: LLE wrapped, RLE no edema or cyanosis  Psych:   Not anxious, not agitated  Skin:  No rashes.  No jaundice       Data Review:   I personally reviewed: vitals, labs, imaging results, notes    Labs:     Recent Labs     09/20/22  0056   WBC 8.7   HGB 10.9*   HCT 31.3*          Recent Labs     09/20/22  0056   *   K 4.6   CL 98   CO2 27   BUN 41*   CREA 4.98*   *   CA 10.2*   PHOS 7.3*       Recent Labs     09/20/22  0056   ALB 2.7*       No results for input(s): INR, PTP, APTT, INREXT, INREXT in the last 72 hours. No results for input(s): FE, TIBC, PSAT, FERR in the last 72 hours. No results found for: FOL, RBCF   No results for input(s): PH, PCO2, PO2 in the last 72 hours. No results for input(s): CPK, CKNDX, TROIQ in the last 72 hours.     No lab exists for component: CPKMB  Lab Results   Component Value Date/Time    Cholesterol, total 142 11/30/2011 09:00 AM    HDL Cholesterol 22 11/30/2011 09:00 AM    LDL, calculated 53.2 11/30/2011 09:00 AM    Triglyceride 334 (H) 11/30/2011 09:00 AM    CHOL/HDL Ratio 6.5 (H) 11/30/2011 09:00 AM     Lab Results   Component Value Date/Time    Glucose (POC) 140 (H) 09/22/2022 06:10 AM    Glucose (POC) 119 (H) 09/21/2022 09:18 PM    Glucose (POC) 186 (H) 09/21/2022 04:30 PM    Glucose (POC) 160 (H) 09/21/2022 06:54 AM    Glucose (POC) 212 (H) 09/20/2022 10:29 PM     Lab Results   Component Value Date/Time    Color YELLOW/STRAW 09/16/2022 09:00 PM    Appearance CLEAR 09/16/2022 09:00 PM    Specific gravity 1.017 09/16/2022 09:00 PM    pH (UA) 5.5 09/16/2022 09:00 PM    Protein >300 (A) 09/16/2022 09:00 PM    Glucose 250 (A) 09/16/2022 09:00 PM    Ketone Negative 09/16/2022 09:00 PM    Bilirubin Negative 09/16/2022 09:00 PM    Urobilinogen 0.2 09/16/2022 09:00 PM    Nitrites Negative 09/16/2022 09:00 PM    Leukocyte Esterase Negative 09/16/2022 09:00 PM    Epithelial cells FEW 09/16/2022 09:00 PM    Bacteria Negative 09/16/2022 09:00 PM    WBC 0-4 09/16/2022 09:00 PM    RBC 5-10 09/16/2022 09:00 PM       Medications Reviewed:     Current Facility-Administered Medications   Medication Dose Route Frequency    furosemide (LASIX) tablet 80 mg  80 mg Oral BID    hydrOXYzine HCL (ATARAX) tablet  mg   mg Oral Q8H PRN    rosuvastatin (CRESTOR) tablet 20 mg  20 mg Oral QHS    sodium chloride (NS) flush 5-40 mL  5-40 mL IntraVENous Q8H    sodium chloride (NS) flush 5-40 mL  5-40 mL IntraVENous PRN    oxyCODONE IR (ROXICODONE) tablet 5 mg  5 mg Oral Q4H PRN    naloxone (NARCAN) injection 0.4 mg  0.4 mg IntraVENous PRN    heparin (porcine) injection 5,000 Units  5,000 Units SubCUTAneous Q8H    morphine injection 4 mg  4 mg IntraVENous Q3H PRN    gabapentin (NEURONTIN) capsule 300 mg  300 mg Oral ACD    Followed by    Tien Dejesus ON 9/26/2022] gabapentin (NEURONTIN) capsule 300 mg  300 mg Oral DIALYSIS MON, WED & FRI    insulin glargine (LANTUS) injection 40 Units  40 Units SubCUTAneous QHS    senna-docusate (PERICOLACE) 8.6-50 mg per tablet 1 Tablet  1 Tablet Oral DAILY    acetaminophen (TYLENOL) tablet 1,000 mg  1,000 mg Oral BID    insulin lispro (HUMALOG) injection 10 Units  10 Units SubCUTAneous TIDAC    insulin lispro (HUMALOG) injection   SubCUTAneous AC&HS    glucose chewable tablet 16 g  4 Tablet Oral PRN    glucagon (GLUCAGEN) injection 1 mg  1 mg IntraMUSCular PRN    dextrose 10 % infusion 0-250 mL  0-250 mL IntraVENous PRN    hydrALAZINE (APRESOLINE) 20 mg/mL injection 20 mg  20 mg IntraVENous Q4H PRN    losartan (COZAAR) tablet 100 mg  100 mg Oral DAILY    albumin human 25% (BUMINATE) solution 25 g  25 g IntraVENous DIALYSIS PRN    sodium chloride (NS) flush 5-40 mL  5-40 mL IntraVENous Q8H    sodium chloride (NS) flush 5-40 mL  5-40 mL IntraVENous PRN    polyethylene glycol (MIRALAX) packet 17 g  17 g Oral DAILY PRN    ondansetron (ZOFRAN ODT) tablet 4 mg  4 mg Oral Q8H PRN    Or    ondansetron (ZOFRAN) injection 4 mg  4 mg IntraVENous Q6H PRN    acetaminophen (TYLENOL) tablet 650 mg  650 mg Oral Q4H PRN    Or    acetaminophen (TYLENOL) suppository 650 mg  650 mg Rectal Q6H PRN    heparin (porcine) 1,000 unit/mL injection 2,200 Units  2,200 Units InterCATHeter DIALYSIS PRN    And    heparin (porcine) 1,000 unit/mL injection 2,100 Units  2,100 Units InterC[facility] DIALYSIS PRN     ______________________________________________________________________  EXPECTED LENGTH OF STAY: 4d 0h  ACTUAL LENGTH OF STAY:          Jp Madden MD

## 2022-09-22 NOTE — PROGRESS NOTES
Problem: Self Care Deficits Care Plan (Adult)  Goal: *Acute Goals and Plan of Care (Insert Text)  Description: FUNCTIONAL STATUS PRIOR TO ADMISSION: Patient was modified independent using a rollator for functional mobility. HOME SUPPORT: The patient lived with spouse and required some assistance with self-care tasks. Occupational Therapy Goals  Initiated 9/22/2022  1. Patient will perform bathing with minimal assistance/contact guard assist within 7 day(s). 2.  Patient will perform lower body dressing with moderate assistance  within 7 day(s). 3.  Patient will perform toilet transfers with moderate assistance  within 7 day(s). 4.  Patient will perform all aspects of toileting with moderate assistance  within 7 day(s). 5.  Patient will participate in upper extremity therapeutic exercise/activities with supervision/set-up for 10 minutes within 7 day(s). 6.  Patient will utilize energy conservation techniques during functional activities with verbal cues within 7 day(s). Outcome: Progressing Towards Goal   OCCUPATIONAL THERAPY EVALUATION  Patient: Marium Ayers (79 y.o. male)  Date: 9/22/2022  Primary Diagnosis: Syncope [R55]  Bimalleolar fracture of left ankle [S82.842A]  Procedure(s) (LRB):  LEFT ANKLE OPEN REDUCTION INTERNAL FIXATION SMALL FRAG 3.5 CORTICAL SCREWS (Left) 2 Days Post-Op   Precautions:   NWB, Fall (LLE)    ASSESSMENT  Based on the objective data described below, the patient presents with increase anxiety, NWB L LE, pain, impaired activity tolerance, generalized weakness, and decrease independence with self-care tasks. Prior to fall, pt was mod I with mobility using rollator and occasional assistance with self-care tasks. Pt is far below his baseline and would benefit from further rehab at d/c. Feel pt is working towards being able to tolerate 3 hours of therapy. Will con't to follow.    Vitals:    09/22/22 0902 09/22/22 0939 09/22/22 0946 09/22/22 0948   BP: (!) 144/66 (!) 92/52 (!) 182/63 (!) 150/76   BP 1 Location: Right upper arm Right upper arm     BP Patient Position: At rest;Semi fowlers Sitting     Pulse: 75 73     Temp:       Resp:       Height:       Weight:       SpO2: 94% 91%        Current Level of Function Impacting Discharge (ADLs/self-care): upto total assist      Other factors to consider for discharge: dialysis      Patient will benefit from skilled therapy intervention to address the above noted impairments. PLAN :  Recommendations and Planned Interventions: self care training, functional mobility training, therapeutic exercise, balance training, therapeutic activities, endurance activities, patient education, and home safety training    Frequency/Duration: Patient will be followed by occupational therapy 5 times a week to address goals. Recommendation for discharge: (in order for the patient to meet his/her long term goals)  Therapy 3 hours per day 5-7 days per week    This discharge recommendation:  Has been made in collaboration with the attending provider and/or case management    IF patient discharges home will need the following DME: TBD       SUBJECTIVE:   Patient stated I get really nervous.     OBJECTIVE DATA SUMMARY:   HISTORY:   Past Medical History:   Diagnosis Date    Anemia     Chronic kidney disease     Diabetes ([facility] [address]     Dorsalgia     ESRD on hemodialysis ([facility] [address]     Gastro-esophageal reflux     Heart failure (HCC)     Hypertension     Obesity     Polyneuropathy     Sciatica    History reviewed. No pertinent surgical history.     Expanded or extensive additional review of patient history:     Home Situation  Home Environment: Independent living ([address] [place] [address])  # Steps to Enter: 0  One/Two Story Residence: One story  Living Alone: No  Support Systems: Spouse/Significant Other  Patient Expects to be Discharged to[de-identified] Skilled nursing facility  Current DME Used/Available at Home: [facility] DEFICITS:  Cognitive/Behavioral Status:  Neurologic State: Alert;Drowsy  Orientation Level: Oriented X4  Cognition: Follows commands             Skin: intact    Edema:     Hearing: Auditory  Auditory Impairment: None    Vision/Perceptual:                                     Range of Motion:    AROM: Generally decreased, functional                         Strength:    Strength: Generally decreased, functional                Coordination:  Coordination: Within functional limits  Fine Motor Skills-Upper: Left Intact; Right Intact    Gross Motor Skills-Upper: Left Intact; Right Intact    Tone & Sensation:    Tone: Normal  Sensation: Intact                      Balance:  Sitting: Intact  Standing: Impaired; With support    Functional Mobility and Transfers for ADLs:  Bed Mobility:   Mod assist x 2 with bed modified    Transfers:  Sit to Stand: Maximum assistance; Other (comment)  Stand to Sit: Moderate assistance;Assist x2  Bed to Chair: Moderate assistance;Assist x2; Other (comment) (lateral transfer to drop arm recliner chair)    ADL Assessment:  Feeding: Independent    Oral Facial Hygiene/Grooming: Setup    Bathing: Moderate assistance    Type of Bath: Chlorhexidine (CHG)    Upper Body Dressing: Minimum assistance    Lower Body Dressing: Maximum assistance    Toileting: Total assistance                  ADL Intervention and task modifications:                 Type of Bath: Chlorhexidine (CHG)           Pain Rating:  Pain noted L LE, no verbal score given    Activity Tolerance:   Good and Fair    After treatment patient left in no apparent distress:    Sitting in chair and Supine in bed    COMMUNICATION/EDUCATION:   The patients plan of care was discussed with: Physical therapist and Registered nurse. Home safety education was provided and the patient/caregiver indicated understanding., Patient/family have participated as able in goal setting and plan of care. , and Patient/family agree to work toward stated goals and plan of care. This patients plan of care is appropriate for delegation to [facility]UEL.     Thank you for this referral.  Donnie Dobson, OTR/L  Time Calculation: 50 mins

## 2022-09-23 LAB
GLUCOSE BLD STRIP.AUTO-MCNC: 149 MG/DL (ref 65–117)
GLUCOSE BLD STRIP.AUTO-MCNC: 151 MG/DL (ref 65–117)
GLUCOSE BLD STRIP.AUTO-MCNC: 232 MG/DL (ref 65–117)
SERVICE CMNT-IMP: ABNORMAL

## 2022-09-23 PROCEDURE — 74011250637 HC RX REV CODE- 250/637: Performed by: ORTHOPAEDIC SURGERY

## 2022-09-23 PROCEDURE — 74011250636 HC RX REV CODE- 250/636: Performed by: ORTHOPAEDIC SURGERY

## 2022-09-23 PROCEDURE — 74011000250 HC RX REV CODE- 250: Performed by: ORTHOPAEDIC SURGERY

## 2022-09-23 PROCEDURE — P9047 ALBUMIN (HUMAN), 25%, 50ML: HCPCS | Performed by: ORTHOPAEDIC SURGERY

## 2022-09-23 PROCEDURE — 74011636637 HC RX REV CODE- 636/637: Performed by: ORTHOPAEDIC SURGERY

## 2022-09-23 PROCEDURE — 82962 GLUCOSE BLOOD TEST: CPT

## 2022-09-23 PROCEDURE — 65270000029 HC RM PRIVATE

## 2022-09-23 PROCEDURE — 90935 HEMODIALYSIS ONE EVALUATION: CPT

## 2022-09-23 RX ORDER — ROSUVASTATIN CALCIUM 10 MG/1
10 TABLET, COATED ORAL
Status: DISCONTINUED | OUTPATIENT
Start: 2022-09-23 | End: 2022-10-03 | Stop reason: HOSPADM

## 2022-09-23 RX ADMIN — Medication 2 UNITS: at 07:05

## 2022-09-23 RX ADMIN — SODIUM CHLORIDE, PRESERVATIVE FREE 10 ML: 5 INJECTION INTRAVENOUS at 22:04

## 2022-09-23 RX ADMIN — HEPARIN SODIUM 5000 UNITS: 5000 INJECTION, SOLUTION INTRAVENOUS; SUBCUTANEOUS at 21:59

## 2022-09-23 RX ADMIN — OXYCODONE 5 MG: 5 TABLET ORAL at 13:03

## 2022-09-23 RX ADMIN — HEPARIN SODIUM 2100 UNITS: 1000 INJECTION INTRAVENOUS; SUBCUTANEOUS at 11:38

## 2022-09-23 RX ADMIN — Medication 3 UNITS: at 17:29

## 2022-09-23 RX ADMIN — INSULIN GLARGINE 40 UNITS: 100 INJECTION, SOLUTION SUBCUTANEOUS at 22:00

## 2022-09-23 RX ADMIN — SODIUM CHLORIDE, PRESERVATIVE FREE 10 ML: 5 INJECTION INTRAVENOUS at 05:20

## 2022-09-23 RX ADMIN — Medication 10 UNITS: at 17:30

## 2022-09-23 RX ADMIN — ACETAMINOPHEN 650 MG: 325 TABLET, FILM COATED ORAL at 21:25

## 2022-09-23 RX ADMIN — ALBUMIN (HUMAN) 25 G: 0.25 INJECTION, SOLUTION INTRAVENOUS at 09:04

## 2022-09-23 RX ADMIN — HEPARIN SODIUM 2200 UNITS: 1000 INJECTION INTRAVENOUS; SUBCUTANEOUS at 11:38

## 2022-09-23 RX ADMIN — ACETAMINOPHEN 650 MG: 325 TABLET, FILM COATED ORAL at 10:35

## 2022-09-23 RX ADMIN — GABAPENTIN 300 MG: 300 CAPSULE ORAL at 17:29

## 2022-09-23 RX ADMIN — ONDANSETRON 4 MG: 2 INJECTION INTRAMUSCULAR; INTRAVENOUS at 12:33

## 2022-09-23 RX ADMIN — Medication 10 UNITS: at 07:05

## 2022-09-23 RX ADMIN — ACETAMINOPHEN 1000 MG: 500 TABLET, FILM COATED ORAL at 17:28

## 2022-09-23 RX ADMIN — HEPARIN SODIUM 5000 UNITS: 5000 INJECTION, SOLUTION INTRAVENOUS; SUBCUTANEOUS at 13:32

## 2022-09-23 RX ADMIN — SODIUM CHLORIDE, PRESERVATIVE FREE 10 ML: 5 INJECTION INTRAVENOUS at 05:21

## 2022-09-23 RX ADMIN — OXYCODONE 5 MG: 5 TABLET ORAL at 05:28

## 2022-09-23 RX ADMIN — HEPARIN SODIUM 5000 UNITS: 5000 INJECTION, SOLUTION INTRAVENOUS; SUBCUTANEOUS at 07:04

## 2022-09-23 RX ADMIN — ROSUVASTATIN CALCIUM 10 MG: 10 TABLET, FILM COATED ORAL at 21:59

## 2022-09-23 RX ADMIN — FUROSEMIDE 80 MG: 40 TABLET ORAL at 17:31

## 2022-09-23 NOTE — PROGRESS NOTES
Orthopedic Progress Note    S: Pain moderately controlled; no new complaints; Denies new numbness, tingling, focal weakness, cp, sob, fever, chills    O: NAD, respirations unlabored; Splint CDI, well fit; thigh and calf soft, no edema, no posterior calf ttp; wiggles all toes, Sensation intact to palpation of toes; Cap refill brisk, foot warm    Patient Vitals for the past 4 hrs:   Temp Pulse BP   09/23/22 1419 -- 79 (!) 96/53   09/23/22 1355 99.1 °F (37.3 °C) 78 (!) 102/39     No results for input(s): HGB, HCT, PLT, BUN, CREA, K, NA, HGBEXT, HCTEXT, PLTEXT in the last 72 hours. XR ANKLE LT MIN 3 V  Narrative: INTERPRETATION PROVIDED FOR COMPLIANCE ONLY AT NO CHARGE     FINDINGS: Intraoperative spot radiographs of the left ankle were obtained during  open reduction and internal fixation of a trimalleolar fracture. Alignment is  nearly anatomic. No operative complication is evident. Impression: Left ankle ORIF in progress. A/P:  Procedure: Procedure(s):  LEFT ANKLE OPEN REDUCTION INTERNAL FIXATION SMALL FRAG 3.5 CORTICAL SCREWS  Post Op day: 3 Days Post-Op    - DVT ppx - Heparin daily in hospital; defer to medicine based on co-morbidities; needs coverage until follow up appointment; SCDs  - PT/OT - NWB LLE  - Pain - continue current Meds; Ice, Elevation  - Dressing - Leave in place if it remains dry and intact; change as needed for saturation with dry sterile island dressing  - Dispo - Pending PT/OT recs; needs f/u in 7-10 days with Dr. Alvina Moore; refer to DC instructions for further details.     ANJANA Rasmussen  Orthopedic Trauma Service  [address]

## 2022-09-23 NOTE — PROGRESS NOTES
Transition of Care Plan  RUR- Low 9%   DISPOSITION: The disposition plan is SNF: [facility]   SNF: [facility] Accepted  Auth pending \"initiated 9/23 per Nadeen admissions liaison\"  Dialysis: MWF at 12:30pm [facility], [address]  Transport needed for HD;  Facility can assist  F/U with PCP/Specialist    Transport: [facility]  Barrier: AUTH          CM: [address] MSW,   [phone]

## 2022-09-23 NOTE — PROGRESS NOTES
Problem: Falls - Risk of  Goal: *Absence of Falls  Description: Document Usha Tyson Fall Risk and appropriate interventions in the flowsheet. Outcome: Progressing Towards Goal  Note: Fall Risk Interventions:  Mobility Interventions: Communicate number of staff needed for ambulation/transfer, Patient to call before getting OOB, Utilize walker, cane, or other assistive device    Mentation Interventions: Adequate sleep, hydration, pain control, Update white board    Medication Interventions: Patient to call before getting OOB    Elimination Interventions: Call light in reach, Patient to call for help with toileting needs    History of Falls Interventions: Bed/chair exit alarm         Problem: Pain  Goal: *Control of Pain  Outcome: Progressing Towards Goal     Problem: Pressure Injury - Risk of  Goal: *Prevention of pressure injury  Description: Document See Scale and appropriate interventions in the flowsheet.   Outcome: Progressing Towards Goal  Note: Pressure Injury Interventions:       Moisture Interventions: Absorbent underpads, Check for incontinence Q2 hours and as needed, Minimize layers    Activity Interventions: Pressure redistribution bed/mattress(bed type), PT/OT evaluation, Assess need for specialty bed    Mobility Interventions: Pressure redistribution bed/mattress (bed type), PT/OT evaluation    Nutrition Interventions: Offer support with meals,snacks and hydration    Friction and Shear Interventions: Minimize layers, Lift sheet

## 2022-09-23 NOTE — PROGRESS NOTES
Problem: Falls - Risk of  Goal: *Absence of Falls  Description: Document Taty Rojas Fall Risk and appropriate interventions in the flowsheet. Outcome: Progressing Towards Goal  Note: Fall Risk Interventions:  Mobility Interventions: Communicate number of staff needed for ambulation/transfer, Patient to call before getting OOB, Utilize walker, cane, or other assistive device    Mentation Interventions: Adequate sleep, hydration, pain control, Update white board    Medication Interventions: Patient to call before getting OOB    Elimination Interventions: Call light in reach, Patient to call for help with toileting needs    History of Falls Interventions: Bed/chair exit alarm         Problem: Patient Education: Go to Patient Education Activity  Goal: Patient/Family Education  Outcome: Progressing Towards Goal     Problem: Pain  Goal: *Control of Pain  Outcome: Progressing Towards Goal  Goal: *PALLIATIVE CARE:  Alleviation of Pain  Outcome: Progressing Towards Goal     Problem: Patient Education: Go to Patient Education Activity  Goal: Patient/Family Education  Outcome: Progressing Towards Goal     Problem: Pressure Injury - Risk of  Goal: *Prevention of pressure injury  Description: Document See Scale and appropriate interventions in the flowsheet.   Outcome: Progressing Towards Goal  Note: Pressure Injury Interventions:       Moisture Interventions: Absorbent underpads, Check for incontinence Q2 hours and as needed, Minimize layers    Activity Interventions: Pressure redistribution bed/mattress(bed type), PT/OT evaluation, Assess need for specialty bed    Mobility Interventions: Pressure redistribution bed/mattress (bed type), PT/OT evaluation    Nutrition Interventions: Offer support with meals,snacks and hydration    Friction and Shear Interventions: Minimize layers, Lift sheet                Problem: Patient Education: Go to Patient Education Activity  Goal: Patient/Family Education  Outcome: Progressing Towards Goal     Problem: Diabetes Self-Management  Goal: *Disease process and treatment process  Description: Define diabetes and identify own type of diabetes; list 3 options for treating diabetes. Outcome: Progressing Towards Goal  Goal: *Incorporating nutritional management into lifestyle  Description: Describe effect of type, amount and timing of food on blood glucose; list 3 methods for planning meals. Outcome: Progressing Towards Goal  Goal: *Incorporating physical activity into lifestyle  Description: State effect of exercise on blood glucose levels. Outcome: Progressing Towards Goal  Goal: *Developing strategies to promote health/change behavior  Description: Define the ABC's of diabetes; identify appropriate screenings, schedule and personal plan for screenings. Outcome: Progressing Towards Goal  Goal: *Using medications safely  Description: State effect of diabetes medications on diabetes; name diabetes medication taking, action and side effects. Outcome: Progressing Towards Goal  Goal: *Monitoring blood glucose, interpreting and using results  Description: Identify recommended blood glucose targets  and personal targets. Outcome: Progressing Towards Goal  Goal: *Prevention, detection, treatment of acute complications  Description: List symptoms of hyper- and hypoglycemia; describe how to treat low blood sugar and actions for lowering  high blood glucose level. Outcome: Progressing Towards Goal  Goal: *Prevention, detection and treatment of chronic complications  Description: Define the natural course of diabetes and describe the relationship of blood glucose levels to long term complications of diabetes.   Outcome: Progressing Towards Goal  Goal: *Developing strategies to address psychosocial issues  Description: Describe feelings about living with diabetes; identify support needed and support network  Outcome: Progressing Towards Goal  Goal: *Insulin pump training  Outcome: Progressing Towards Goal  Goal: *Sick day guidelines  Outcome: Progressing Towards Goal  Goal: *Patient Specific Goal (EDIT GOAL, INSERT TEXT)  Outcome: Progressing Towards Goal     Problem: Patient Education: Go to Patient Education Activity  Goal: Patient/Family Education  Outcome: Progressing Towards Goal     Problem: Discharge Planning  Goal: *Discharge to safe environment  Outcome: Progressing Towards Goal  Goal: *Knowledge of medication management  Outcome: Progressing Towards Goal  Goal: *Knowledge of discharge instructions  Outcome: Progressing Towards Goal     Problem: Patient Education: Go to Patient Education Activity  Goal: Patient/Family Education  Outcome: Progressing Towards Goal     Problem: Patient Education: Go to Patient Education Activity  Goal: Patient/Family Education  Outcome: Progressing Towards Goal     Problem: Patient Education: Go to Patient Education Activity  Goal: Patient/Family Education  Outcome: Progressing Towards Goal

## 2022-09-23 NOTE — PROGRESS NOTES
[address]  Hospitalist Group                                                                                          Hospitalist Progress Note  Elsa Thomas MD  Answering service: [phone] from in house phone        Date of Service:  2022  NAME:  Cherelle Quinones  :  1960  MRN:  [id number]      Admission Summary:   Cherelle Quinones is a 58 y.o. male who presents with fall and ankle fracture. Patient has past medical history of end-stage renal disease on hemodialysis. He said he struck his head he complains of left ankle injury. There was an injury over the medial malleolus . Right now patient complains of severe ankle pain was sedated for left ankle stabilization. Denies any chest pain, shortness of breath, fevers or recent illnesses. No headache. He does receive heparin for dialysis. He was due for dialysis today and typically receives it  and Friday. Last dialysis was Wednesday. He sees Dr. Pino Hancock.   Nephrology consulted for resuming hemodialysis     Interval history / Subjective:     HD today awaiting SNF  Weight bearing restriction NWB left leg x 3 months       Assessment & Plan:     Left ankle dislocation, left distal fibula fracture s/p mechanical fall  S/p Closed reduction in ED  ORIF on 20  Strict ice/elevation left leg for edema control  DVT prophylaxisHeparin  Weight bearing restriction NWB left leg x 3 months  Pain control   Mild leukocytosis likely reactive 2/2 this- resolved     ESRD on HD MWF anemia of CKD   MWF at [facility]  Nephrology following    Volume overload:  improving attempting to pull 4-4.5 kg each HD    Panic attacks, anxiety   Likely 2/2 the abrupt discontinuation of chronic gabapentin which can cause irritability, tachycardia, diaphoresis (ie anxiety), therefore resumed gabapentin at 300 bid for now     Mechanical fall, \"legs buckled\"  Denies any pre-syncopal symptoms  CT head no acute abnormality  Carotid Doppler w/o b/l ICA stenosis, VA s patent,   Incidental large left thyroid nodule - f/up PCP for thyroid US [facility]      HTN  Cont home ARB  IV PRN    DM2, a1c 12.8, uncontrolled  SSI, Lantus, Humalog- cont to adjust PRN    H/o hypothyroidism  TSH low at 0.25  Holding Levothyroxine    Hyponatremia  Likely SIADH from ankle fracture, stress, pain, anxiety  Resolved     Code status: Full Code  Prophylaxis: Heparin ppx  Care Plan discussed with: Patient/Family, Nurse, and   Anticipated Disposition: Home  Anticipated Discharge:  24 hrs  SNF ready for d/c     Hospital Problems  Never Reviewed            Codes Class Noted POA    Bimalleolar fracture of left ankle ICD-10-CM: Q12.457E  ICD-9-CM: 824.4  9/17/2022 Unknown        Syncope ICD-10-CM: R55  ICD-9-CM: 780.2  9/16/2022 Unknown       Review of Systems:   Pertinent items are noted in HPI    Vital Signs:    Last 24hrs VS reviewed since prior progress note. Most recent are:  Visit Vitals  /70 (BP 1 Location: Right upper arm, BP Patient Position: At rest;Supine)   Pulse 82   Temp 98.4 °F (36.9 °C)   Resp 16   Ht 6' (1.829 m)   Wt 135.3 kg (298 lb 4.5 oz)   SpO2 90%   BMI 40.45 kg/m²       No intake or output data in the 24 hours ending 09/23/22 0816       Physical Examination:   I had a face to face encounter with this patient and independently examined them on 9/23/2022 as outlined below:    General: Alert, cooperative, no acute distress    EENT:  EOMI. Anicteric sclerae. MMM  Resp:  CTA bilaterally, no wheezing or rales. No accessory muscle use  CV:  RRR, No audible murmur  GI:  Soft, Non distended, Non tender  Neurologic:  Alert and oriented, normal speech, ENAMORADO  Extremities: LLE wrapped, RLE no edema or cyanosis  Psych:   Not anxious, not agitated  Skin:  No rashes.  No jaundice       Data Review:   I personally reviewed: vitals, labs, imaging results, notes    Labs:     No results for input(s): WBC, HGB, HCT, PLT, HGBEXT, HCTEXT, PLTEXT, HGBEXT, HCTEXT, PLTEXT in the last 72 hours. No results for input(s): NA, K, CL, CO2, BUN, CREA, GLU, CA, MG, PHOS, URICA in the last 72 hours. No results for input(s): ALT, AP, TBIL, TBILI, TP, ALB, GLOB, GGT, AML, LPSE in the last 72 hours. No lab exists for component: SGOT, GPT, AMYP, HLPSE    No results for input(s): INR, PTP, APTT, INREXT, INREXT in the last 72 hours. No results for input(s): FE, TIBC, PSAT, FERR in the last 72 hours. No results found for: FOL, RBCF   No results for input(s): PH, PCO2, PO2 in the last 72 hours. No results for input(s): CPK, CKNDX, TROIQ in the last 72 hours.     No lab exists for component: CPKMB  Lab Results   Component Value Date/Time    Cholesterol, total 142 11/30/2011 09:00 AM    HDL Cholesterol 22 11/30/2011 09:00 AM    LDL, calculated 53.2 11/30/2011 09:00 AM    Triglyceride 334 (H) 11/30/2011 09:00 AM    CHOL/HDL Ratio 6.5 (H) 11/30/2011 09:00 AM     Lab Results   Component Value Date/Time    Glucose (POC) 151 (H) 09/23/2022 06:38 AM    Glucose (POC) 150 (H) 09/22/2022 09:26 PM    Glucose (POC) 161 (H) 09/22/2022 04:35 PM    Glucose (POC) 219 (H) 09/22/2022 11:16 AM    Glucose (POC) 140 (H) 09/22/2022 06:10 AM     Lab Results   Component Value Date/Time    Color YELLOW/STRAW 09/16/2022 09:00 PM    Appearance CLEAR 09/16/2022 09:00 PM    Specific gravity 1.017 09/16/2022 09:00 PM    pH (UA) 5.5 09/16/2022 09:00 PM    Protein >300 (A) 09/16/2022 09:00 PM    Glucose 250 (A) 09/16/2022 09:00 PM    Ketone Negative 09/16/2022 09:00 PM    Bilirubin Negative 09/16/2022 09:00 PM    Urobilinogen 0.2 09/16/2022 09:00 PM    Nitrites Negative 09/16/2022 09:00 PM    Leukocyte Esterase Negative 09/16/2022 09:00 PM    Epithelial cells FEW 09/16/2022 09:00 PM    Bacteria Negative 09/16/2022 09:00 PM    WBC 0-4 09/16/2022 09:00 PM    RBC 5-10 09/16/2022 09:00 PM       Medications Reviewed:     Current Facility-Administered Medications   Medication Dose Route Frequency    furosemide (LASIX) tablet 80 mg  80 mg Oral BID    hydrOXYzine HCL (ATARAX) tablet  mg   mg Oral Q8H PRN    rosuvastatin (CRESTOR) tablet 20 mg  20 mg Oral QHS    sodium chloride (NS) flush 5-40 mL  5-40 mL IntraVENous Q8H    sodium chloride (NS) flush 5-40 mL  5-40 mL IntraVENous PRN    oxyCODONE IR (ROXICODONE) tablet 5 mg  5 mg Oral Q4H PRN    naloxone (NARCAN) injection 0.4 mg  0.4 mg IntraVENous PRN    heparin (porcine) injection 5,000 Units  5,000 Units SubCUTAneous Q8H    morphine injection 4 mg  4 mg IntraVENous Q3H PRN    gabapentin (NEURONTIN) capsule 300 mg  300 mg Oral ACD    Followed by    Vinny Feliz ON 9/26/2022] gabapentin (NEURONTIN) capsule 300 mg  300 mg Oral DIALYSIS MON, WED & FRI    insulin glargine (LANTUS) injection 40 Units  40 Units SubCUTAneous QHS    senna-docusate (PERICOLACE) 8.6-50 mg per tablet 1 Tablet  1 Tablet Oral DAILY    acetaminophen (TYLENOL) tablet 1,000 mg  1,000 mg Oral BID    insulin lispro (HUMALOG) injection 10 Units  10 Units SubCUTAneous TIDAC    insulin lispro (HUMALOG) injection   SubCUTAneous AC&HS    glucose chewable tablet 16 g  4 Tablet Oral PRN    glucagon (GLUCAGEN) injection 1 mg  1 mg IntraMUSCular PRN    dextrose 10 % infusion 0-250 mL  0-250 mL IntraVENous PRN    hydrALAZINE (APRESOLINE) 20 mg/mL injection 20 mg  20 mg IntraVENous Q4H PRN    losartan (COZAAR) tablet 100 mg  100 mg Oral DAILY    albumin human 25% (BUMINATE) solution 25 g  25 g IntraVENous DIALYSIS PRN    sodium chloride (NS) flush 5-40 mL  5-40 mL IntraVENous Q8H    sodium chloride (NS) flush 5-40 mL  5-40 mL IntraVENous PRN    polyethylene glycol (MIRALAX) packet 17 g  17 g Oral DAILY PRN    ondansetron (ZOFRAN ODT) tablet 4 mg  4 mg Oral Q8H PRN    Or    ondansetron (ZOFRAN) injection 4 mg  4 mg IntraVENous Q6H PRN    acetaminophen (TYLENOL) tablet 650 mg  650 mg Oral Q4H PRN    Or    acetaminophen (TYLENOL) suppository 650 mg  650 mg Rectal Q6H PRN    heparin (porcine) 1,000 unit/mL injection 2,200 Units  2,200 Units [facility] DIALYSIS PRN    And    heparin (porcine) 1,000 unit/mL injection 2,100 Units  2,100 Units [facility] DIALYSIS PRN     ______________________________________________________________________  EXPECTED LENGTH OF STAY: 4d 0h  ACTUAL LENGTH OF STAY:          Stephane Rangel MD

## 2022-09-23 NOTE — PROCEDURES
[facility]                      [phone]  Vitals Pre Post Assessment Pre Post   BP BP: (!) 124/53 (09/23/22 0820)   118/53 LOC A&Ox4 A&Ox4   HR Pulse (Heart Rate): 66 (09/23/22 0820) 79 Lungs RA RA   Resp Resp Rate: 16 (09/23/22 0820) 16 Cardiac RRR RRR   Temp Temp: 98.4 °F (36.9 °C) (09/23/22 0820) 98.4 Skin Warm and dry  LLE dsg CDI Remains warm and dry   Weight Pre-Dialysis Weight: 135.3 kg (298 lb 4.5 oz) (09/23/22 0820)  Edema No peripheral edema noted none   Pain Pain Intensity 1: 10 (09/23/22 0519)  Tele Status Not monitored      Orders   Duration: Start: 0820 End: 1220 Total: 4 hrs   Dialyzer: Dialyzer/Set Up Inspection: Revaclear (09/23/22 0820)   K Bath: Dialysate K (mEq/L): 2 (09/23/22 0820)   Ca Bath: Dialysate CA (mEq/L): 2.5 (09/23/22 0820)   Na: Dialysate NA (mEq/L): 138 (09/23/22 0820)   Bicarb: 35   Target Fluid Removal: Goal/Amount of Fluid to Remove (mL): 4000 mL (09/23/22 0820)     Access   Type & Location: RIJ tunneled CVC- transparent dsg CDI and dated 09/21/22   Comments: +aspiration/flush,  as ordered with acceptable Ap/      Labs   HBsAg (Antigen) / date: Neg (09/16/22)                                      HBsAb (Antibody) / date: 58/immune (09/16/22)   Source: EPIC   Obtained/Reviewed  Critical Results Called HGB   Date Value Ref Range Status   09/20/2022 10.9 (L) 12.1 - 17.0 g/dL Final     Potassium   Date Value Ref Range Status   09/20/2022 4.6 3.5 - 5.1 mmol/L Final     Calcium   Date Value Ref Range Status   09/20/2022 10.2 (H) 8.5 - 10.1 MG/DL Final     BUN   Date Value Ref Range Status   09/20/2022 41 (H) 6 - 20 MG/DL Final     Comment:     INVESTIGATED PER DELTA CHECK PROTOCOL     Creatinine   Date Value Ref Range Status   09/20/2022 4.98 (H) 0.70 - 1.30 MG/DL Final     Comment:     INVESTIGATED PER DELTA CHECK PROTOCOL        Meds Given   Name Dose Route   heparin                 Adequacy / Fluid    Total Liters Process: 84L   Net Fluid Removed: 1000 mL Comments   Time Out Done: 0815   Admitting Diagnosis: Volume overload, ESRD   Consent obtained/signed: Informed Consent Verified:  (chronic consent applies) (09/23/22 0820)   Machine / RO # Machine Number: [id number] (09/23/22 0820)   Primary Nurse Rpt Pre: CHERYL Roberto RN   Primary Nurse Rpt Post: CHERYL Roberto RN   Pt Education: CVC precautions   Care Plan: HD today and MWF   Pts outpatient clinic: [facility]     Tx Summary   Comments:                         7117: Safety checks complete, time out performed. 0820: CVC assessed, no redness, warmth or drainage noted. Transparent dressing and biopatch CDI. Each catheter limb disinfected for 60 seconds per limb with alcohol swabs. Caps removed, dialysis CVC hub scrubbed with Prevantics for 15 seconds, followed by a 5 second dry time per Hospital P&P. Aspirated and discarded 5ml from each lumen. +aspiration/flush. HD initiated. Access visible, lines secure. Medications reviewed. 0840: TINO Carter NP at bedside for exam and to discuss plan of care. UF goal reduced to 6916-9244 ml as tolerated. 1040: Pt c/o pain to left ankle, primary RN notified. Primary N at bedside to administer tylenol. 1215: HD complete. All possible blood rinsed back. Each catheter limb disinfected for 60 seconds per limb with alcohol swabs. Dialysis CVC hubs scrubbed with Prevantics for 15 seconds, followed by a 5 second dry time per Hospital P&P. Saline flushed, locked and capped. SBAR given to primary RN.

## 2022-09-23 NOTE — PROGRESS NOTES
Nephrology Progress Note  Rob Hernandez  Date of Admission : 9/16/2022    CC:  Follow up for ESRD       Assessment and Plan     ESRD on HD:  - MWF at [facility]  - Access: (R) permacath  - on dialysis attempting to remove 2 kg today BP dropped slightly  - will need labs on Monday if still hospitalized  - Plans to transfer to SNF     Volume overload:  - improving attempting to pull 4-4.5 kg each HD    HTN:  - cont current meds    Anemia of CKD:  - 9/20 Hgb stable without ADONIS can resume outpatient has his HD clinic      Secondary HPT    L distal fibula fracture:  - s/p ORIF 9/20    DM2  Obesity  Noncompliance       Interval History:  Seen and examined on dialysis. Alert and oriented. Pain controlled. No cp. Denies cp or sob. BP a little low during HD - Albumin given. No labs noted. Awaiting SNF placement. Current Medications: all current  Medications have been eviewed in EPIC  Review of Systems: Pertinent items are noted in HPI. Objective:  Vitals:    Vitals:    09/23/22 0830 09/23/22 0845 09/23/22 0900 09/23/22 0915   BP: (!) 91/46 (!) 111/51 (!) 99/51 103/60   Pulse: 67 71 70 68   Resp: 16      Temp:       SpO2:       Weight:       Height:         Intake and Output:  No intake/output data recorded. No intake/output data recorded. Physical Examination:    General: NAD,Conversant   Neck:  Supple, no mass  Resp:  Lungs CTA B/L, no wheezing , normal respiratory effort  CV:  RRR,  no murmur or rub, 2+ LE edema  GI:  Soft, NT, + Bowel sounds, no hepatosplenomegaly  Neurologic:  Non focal  Psych:             AAO x 3 appropriate affect  Skin:  No Rash- left foot splint dressing D/I  Access:           RIJ PC c/d/i      No results for input(s): NA, K, CL, CO2, GLU, BUN, CREA, CA, MG, PHOS, ALB, TBIL, ALT, INR, INREXT, INREXT in the last 72 hours.     No lab exists for component: SGOT    No results for input(s): WBC, HGB, HCT, PLT, HGBEXT, HCTEXT, PLTEXT, HGBEXT, HCTEXT, PLTEXT in the last 72 hours. No results found for: SDES  No results found for: CULT  Recent Results (from the past 24 hour(s))   GLUCOSE, POC    Collection Time: 09/22/22 11:16 AM   Result Value Ref Range    Glucose (POC) 219 (H) 65 - 117 mg/dL    Performed by [address], POC    Collection Time: 09/22/22  4:35 PM   Result Value Ref Range    Glucose (POC) 161 (H) 65 - 117 mg/dL    Performed by [address], POC    Collection Time: 09/22/22  9:26 PM   Result Value Ref Range    Glucose (POC) 150 (H) 65 - 117 mg/dL    Performed by Jose Rider, POC    Collection Time: 09/23/22  6:38 AM   Result Value Ref Range    Glucose (POC) 151 (H) 65 - 117 mg/dL    Performed by Sushma Cha      The above assessment and plan reviewed with Dr. Luda Hall. Nacho Rick NP  [address]  Phone - [phone]   Fax - [phone]  www. [facility]

## 2022-09-23 NOTE — PROGRESS NOTES
Physical Therapy    Attempted to follow up for therapy. Pt resting in bed and reports feeling nauseous (RN has already provided Zofran and pain meds). Pt also with BP on lower end (96/53) s/p HD earlier today and SpO2 waxing and waning between 89-94% with cues for PLB. RN notified. Will follow up as able/appropriate. Physical Therapy    Pt currently ADY for HD. Will follow up as able and appropriate.     Thank you,  Rose Almanza, PT, DPT

## 2022-09-23 NOTE — DISCHARGE INSTRUCTIONS
Discharge Instructions       PATIENT ID: Sydney Klein  MRN: [id number]   YOB: 1960    DATE OF ADMISSION: 9/16/22   DATE OF DISCHARGE: 10/3/2022    PRIMARY CARE PROVIDER: Jose Garcia MD    ATTENDING PHYSICIAN: Ulises Pradhan MD  DISCHARGING PROVIDER: Jose Cohen NP    To contact this individual call [phone] and ask the  to page. If unavailable ask to be transferred the Adult Hospitalist Department. DISCHARGE DIAGNOSES: Left Ankle Fracture, Near Syncope, AFIB (new onset), Constipation, ESRD on HD, HTN, T2DM, Obesity, Urinary Retention, Anemia of CKD    CONSULTATIONS: Cardiology, Nephrology, Orthopedic Surgery     PROCEDURES/SURGERIES: Procedure(s):  LEFT UPPER ARM CEPHALIC VEIN TRANSPOSITION FISTULA    PENDING TEST RESULTS:   At the time of discharge the following test results are still pending: none. FOLLOW UP APPOINTMENTS:   1. Russel Mcgregor MD (Nephrology) MWF at [facility]  2. Jose Garcia MD (PCP) make appointment for 2-3 days  3. Dr. Chace Lea MD (Cardiology) 1 week  4. Dr. Ally Arias (Ortho Surgery) 7-10 days      ADDITIONAL CARE RECOMMENDATIONS:   You have been deemed stable for discharge and are being discharged to [facility]. You are taking a blood thinner WARFARIN- should you develop any abnormal or prolonged bleeding- seek care at nearest ER. Monitor INR daily for goal INR 2-3: AFIB. You have completed all prescribed antibiotics. Should you need medication refills beyond what we have prescribed for you, you will need to contact your primary care provider for refills.     Return to the ER or notify your primary care office if you have a fever greater than 101.5 associated with chills, chest pain, or signs and symptoms of a stroke which are:  B E F A S T  -loss of balance, headaches or dizziness  -eyes blurred vision (sudden)  -asymmetrical facial symmetry (side of face droops)  -arms and leg weakness  -slurred speech / difficulty speaking  -if you experience any of these (time to call for an ambulance)    DIET: Cardiac Diet    ACTIVITY: PT/OT Eval and Treat    WOUND CARE: Keep skin warm and dry. EQUIPMENT needed: per PT/OT recommendations         DISCHARGE MEDICATIONS:   See Medication Reconciliation Form    It is important that you take the medication exactly as they are prescribed. Keep your medication in the bottles provided by the pharmacist and keep a list of the medication names, dosages, and times to be taken in your wallet. Do not take other medications without consulting your doctor. NOTIFY YOUR PHYSICIAN FOR ANY OF THE FOLLOWING:   Fever over 101 degrees for 24 hours. Chest pain, shortness of breath, fever, chills, nausea, vomiting, diarrhea, change in mentation, falling, weakness, bleeding. Severe pain or pain not relieved by medications. Or, any other signs or symptoms that you may have questions about. DISPOSITION:    Home With:   OT  PT  HH  RN      X SNF/Inpatient Rehab/LTAC: [facility]    Independent/assisted living    Hospice    Other:     CDMP Checked: Yes X     PROBLEM LIST Updated: Yes X       Signed:   Dahlia Castellanos NP  10/3/2022  11:12 AM              Postoperative Patient Instructions    Surgeon: Say Baltazar MD  [phone]    Please maintain the dressing and/or splint placed at surgery until your follow up appointment. We will remove it at your appointment. Please keep the dressing clean and dry. If you have any questions or problems with your dressing, please call our office. Please elevate the operative extremity to the level of the heart to keep swelling at a minimum. You may get up to move around, but when seated please keep the extremity elevated as much as possible. This will decrease swelling and postoperative pain. If you were told to be non-weight bearing following surgery, please do not place the foot on the ground.   You may use crutches or we can help arrange for a scooter for you to stay off of your operative leg. If you are in a special shoe or boot and told that you may bear weight as tolerated, then you may do so, but please keep the extremity elevated when not moving around. If you had a block from the Anesthesia doctor before your surgery, expect this to wear off around 12-24 hours after you received it and you should start taking your pain medication when the feeling begins to come back into your leg. A prescription for pain medicine is provided. The key to pain control is staying ahead. For the first 48-72 hours after your surgery, you may want to take your pain medication of a regular schedule. After that, you may only need it on an as-needed basis. Please begin take the blood thinner prescribed to you by the medicine doctor. This can lower the risk of developing a blood clot after surgery. Signs and symptoms of infection include: redness, increased pain, increased swelling not relieved by elevation, drainage, fever, or chills. If you develop any of these symptoms, call the office  Please follow-up at my office at 7-10 days after surgery or when specified at your pre-operative appointment.           Caren Frances  9/23/2022

## 2022-09-23 NOTE — PROGRESS NOTES
Renal Dosing/Monitoring  Medication: Rosuvastatin   Current regimen:  20 mg Q HS  No results for input(s): CREA, BUN in the last 72 hours.   Estimated CrCl:  ESRD on HD     Plan: Change to 10 mg Q HS per P&T/[facility]-approved renal adjustment protocol for patient with ESRD on HD    Anne JimenezD  Clinical Pharmacist, Orthopedics and Med/Surg  [address] [facility] () No

## 2022-09-24 LAB
GLUCOSE BLD STRIP.AUTO-MCNC: 141 MG/DL (ref 65–117)
GLUCOSE BLD STRIP.AUTO-MCNC: 152 MG/DL (ref 65–117)
GLUCOSE BLD STRIP.AUTO-MCNC: 155 MG/DL (ref 65–117)
GLUCOSE BLD STRIP.AUTO-MCNC: 169 MG/DL (ref 65–117)
SERVICE CMNT-IMP: ABNORMAL

## 2022-09-24 PROCEDURE — 74011250637 HC RX REV CODE- 250/637: Performed by: FAMILY MEDICINE

## 2022-09-24 PROCEDURE — 74011250637 HC RX REV CODE- 250/637: Performed by: ORTHOPAEDIC SURGERY

## 2022-09-24 PROCEDURE — 82962 GLUCOSE BLOOD TEST: CPT

## 2022-09-24 PROCEDURE — 74011636637 HC RX REV CODE- 636/637: Performed by: ORTHOPAEDIC SURGERY

## 2022-09-24 PROCEDURE — 74011250636 HC RX REV CODE- 250/636: Performed by: ORTHOPAEDIC SURGERY

## 2022-09-24 PROCEDURE — 74011000250 HC RX REV CODE- 250: Performed by: ORTHOPAEDIC SURGERY

## 2022-09-24 PROCEDURE — 65270000029 HC RM PRIVATE

## 2022-09-24 RX ORDER — LOSARTAN POTASSIUM 50 MG/1
50 TABLET ORAL DAILY
Status: DISCONTINUED | OUTPATIENT
Start: 2022-09-25 | End: 2022-10-03 | Stop reason: HOSPADM

## 2022-09-24 RX ORDER — POLYETHYLENE GLYCOL 3350 17 G/17G
17 POWDER, FOR SOLUTION ORAL DAILY
Status: DISCONTINUED | OUTPATIENT
Start: 2022-09-24 | End: 2022-10-03 | Stop reason: HOSPADM

## 2022-09-24 RX ORDER — FACIAL-BODY WIPES
10 EACH TOPICAL DAILY PRN
Status: DISCONTINUED | OUTPATIENT
Start: 2022-09-24 | End: 2022-10-03 | Stop reason: HOSPADM

## 2022-09-24 RX ADMIN — OXYCODONE 5 MG: 5 TABLET ORAL at 12:56

## 2022-09-24 RX ADMIN — INSULIN GLARGINE 40 UNITS: 100 INJECTION, SOLUTION SUBCUTANEOUS at 22:26

## 2022-09-24 RX ADMIN — SODIUM CHLORIDE, PRESERVATIVE FREE 10 ML: 5 INJECTION INTRAVENOUS at 22:28

## 2022-09-24 RX ADMIN — GABAPENTIN 300 MG: 300 CAPSULE ORAL at 17:11

## 2022-09-24 RX ADMIN — HEPARIN SODIUM 5000 UNITS: 5000 INJECTION, SOLUTION INTRAVENOUS; SUBCUTANEOUS at 07:08

## 2022-09-24 RX ADMIN — Medication 10 UNITS: at 12:49

## 2022-09-24 RX ADMIN — Medication 2 UNITS: at 17:12

## 2022-09-24 RX ADMIN — ACETAMINOPHEN 1000 MG: 500 TABLET, FILM COATED ORAL at 09:57

## 2022-09-24 RX ADMIN — SODIUM CHLORIDE, PRESERVATIVE FREE 10 ML: 5 INJECTION INTRAVENOUS at 07:10

## 2022-09-24 RX ADMIN — OXYCODONE 5 MG: 5 TABLET ORAL at 22:27

## 2022-09-24 RX ADMIN — FUROSEMIDE 80 MG: 40 TABLET ORAL at 09:57

## 2022-09-24 RX ADMIN — LOSARTAN POTASSIUM 100 MG: 50 TABLET, FILM COATED ORAL at 09:57

## 2022-09-24 RX ADMIN — HEPARIN SODIUM 5000 UNITS: 5000 INJECTION, SOLUTION INTRAVENOUS; SUBCUTANEOUS at 22:26

## 2022-09-24 RX ADMIN — ROSUVASTATIN CALCIUM 10 MG: 10 TABLET, FILM COATED ORAL at 22:26

## 2022-09-24 RX ADMIN — Medication 2 UNITS: at 12:49

## 2022-09-24 RX ADMIN — OXYCODONE 5 MG: 5 TABLET ORAL at 17:20

## 2022-09-24 RX ADMIN — OXYCODONE 5 MG: 5 TABLET ORAL at 07:08

## 2022-09-24 RX ADMIN — Medication 10 UNITS: at 17:12

## 2022-09-24 RX ADMIN — Medication 10 UNITS: at 07:08

## 2022-09-24 RX ADMIN — Medication 2 UNITS: at 07:09

## 2022-09-24 RX ADMIN — POLYETHYLENE GLYCOL 3350 17 G: 17 POWDER, FOR SOLUTION ORAL at 17:11

## 2022-09-24 RX ADMIN — ACETAMINOPHEN 1000 MG: 500 TABLET, FILM COATED ORAL at 17:10

## 2022-09-24 NOTE — PROGRESS NOTES
[address] [facility] Adult  Hospitalist Group                       Hospitalist Progress Note          Tejal Hollis MD  Please call  and page for questions. Call physician on-call through the  7pm-7am        Daily Progress Note: 9/24/2022    Primary care provider:Blevins, Christiane Lennox, MD    Date of admission: 9/16/2022  9:32 AM    Admission summery and hospital course:  58 y.o. male who presents with fall and ankle fracture. Patient has past medical history of end-stage renal disease on hemodialysis. He said he struck his head he complains of left ankle injury. There was an injury over the medial malleolus . Right now patient complains of severe ankle pain was sedated for left ankle stabilization. Denies any chest pain, shortness of breath, fevers or recent illnesses. No headache. He does receive heparin for dialysis. He was due for dialysis today and typically receives it Monday Wednesday and Friday. Last dialysis was Wednesday. He sees Dr. Mai Terry. Nephrology consulted for resuming hemodialysis        Subjective:   Patient said said he has been constipated for last several days. Bowel regimen he is getting is not helping him. .  Patient also complains of pain not well controlled. Assessment/Plan:     Left ankle dislocation, left distal fibula fracture s/p mechanical fall  S/p Closed reduction in ED  ORIF on 9/20/20  Strict ice/elevation left leg for edema control. Weight bearing restriction NWB left leg x 3 months. Pain control . Mild leukocytosis likely reactive 2/2 this- resolved. ESRD on HD MWF anemia of CKD   MWF at [facility], Nephrology following.      Volume overload:  improving attempting to pull 4-4.5 kg each HD     Panic attacks, anxiety 9/18  Likely 2/2 the abrupt discontinuation of chronic gabapentin which can cause irritability, tachycardia, diaphoresis (ie anxiety), therefore resumed gabapentin at 300 bid for now     Mechanical fall, \"legs buckled\"  Denies any pre-syncopal symptoms  CT head no acute abnormality  Carotid Doppler w/o b/l ICA stenosis, VA s patent,   Incidental large left thyroid nodule - f/up PCP for thyroid US [facility]      Hypertension  BP on the lower side of normal, decrease the losartan dose with parameter and monitor. DM2, a1c 12.8, uncontrolled  SSI, Lantus, Humalog- cont to adjust PRN     Hypothyroidism  TSH low at 0.25, continue to hold Levothyroxine     Hyponatremia  Likely SIADH from ankle fracture, stress, pain, anxiety, resolving. Monitor. Constipation  -Start with daily polyethylene glycol and monitor with other bowel regimen including Dulcolax. DIET: ADULT DIET Regular; Safety Tray. ISOLATION PRECAUTIONS: There are currently no Active Isolations  CODE STATUS: Full code   DVT PROPHYLAXIS: SCDs  FUNCTIONAL STATUS PRIOR TO HOSPITALIZATION:   Fully active and ambulatory; able to carry on all self-care without restriction. Ambulatory status/function: By self   EARLY MOBILITY ASSESSMENT:   Recommend as per orthopedics, PT OT. Communication: I communicated with patient/family and RN. ANTICIPATED DISCHARGE: When insurance authorization is completed. ANTICIPATED DISPOSITION: To SNF.          Review of Systems:     Review of Systems:  Symptom  Y/N  Comments   Symptom  Y/N  Comments    Fever/Chills   n   Chest Pain  n    Poor Appetite  n    Edema  n     Cough  n   Abdominal Pain  n     Sputum  n   Joint Pain  n    SOB/DE SANTIAGO  n   Pruritis/Rash      Nausea/vomit  n   Tolerating PT/OT      Diarrhea  n   Tolerating Diet      Constipation  y   Other      Could not obtain due to:         Objective:   Physical Exam:     Visit Vitals  /73   Pulse 89   Temp 98.3 °F (36.8 °C)   Resp 16   Ht 6' (1.829 m)   Wt 135.3 kg (298 lb 4.5 oz)   SpO2 93%   BMI 40.45 kg/m²    O2 Flow Rate (L/min): 0 l/min O2 Device: None (Room air)    Temp (24hrs), Av.3 °F (36.8 °C), Min:98.2 °F (36.8 °C), Max:98.3 °F (36.8 °C)    No intake/output data recorded. 09/22 1901 - 09/24 0700  In: -   Out: 1000       General:  Alert, cooperative, no distress, appears stated age. Lungs:   Clear to auscultation bilaterally. Chest wall:  No tenderness or deformity. Heart:  Regular rate and rhythm, S1, S2 normal, no murmur, click, rub or gallop. Abdomen:   Obese, soft, non-tender. Bowel sounds normal.    Extremities: Left ankle is covered with dry and clean dressing and bandage. Extremities normal, atraumatic, no cyanosis or edema. Pulses: 2+ and symmetric all extremities. Skin: Skin color, texture, turgor normal. No rashes or lesions   Neurologic: Patient has clear voice. He follows command. Data Review:       Recent Days:  No results for input(s): WBC, HGB, HCT, PLT, HGBEXT, HCTEXT, PLTEXT in the last 72 hours. No results for input(s): NA, K, CL, CO2, GLU, BUN, CREA, CA, MG, PHOS, ALB, TBIL, ALT, INR, INREXT in the last 72 hours. No lab exists for component: SGOT  No results for input(s): PH, PCO2, PO2, HCO3, FIO2 in the last 72 hours.     24 Hour Results:  Recent Results (from the past 24 hour(s))   GLUCOSE, POC    Collection Time: 09/23/22  5:08 PM   Result Value Ref Range    Glucose (POC) 232 (H) 65 - 117 mg/dL    Performed by 46Cheryl Davalos, POC    Collection Time: 09/23/22  9:28 PM   Result Value Ref Range    Glucose (POC) 149 (H) 65 - 117 mg/dL    Performed by Monse Rader, POC    Collection Time: 09/24/22  6:25 AM   Result Value Ref Range    Glucose (POC) 155 (H) 65 - 117 mg/dL    Performed by Odalis Colmenares, POC    Collection Time: 09/24/22 11:32 AM   Result Value Ref Range    Glucose (POC) 152 (H) 65 - 117 mg/dL    Performed by Noe Almanza        Problem List:  Problem List as of 9/24/2022 Never Reviewed            Codes Class Noted - Resolved    Bimalleolar fracture of left ankle ICD-10-CM: P28.182C  ICD-9-CM: 824.4  9/17/2022 - Present        Syncope ICD-10-CM: R55  ICD-9-CM: 780.2 9/16/2022 - Present           Medications reviewed  Current Facility-Administered Medications   Medication Dose Route Frequency    polyethylene glycol (MIRALAX) packet 17 g  17 g Oral DAILY    bisacodyL (DULCOLAX) suppository 10 mg  10 mg Rectal DAILY PRN    rosuvastatin (CRESTOR) tablet 10 mg  10 mg Oral QHS    furosemide (LASIX) tablet 80 mg  80 mg Oral BID    hydrOXYzine HCL (ATARAX) tablet  mg   mg Oral Q8H PRN    sodium chloride (NS) flush 5-40 mL  5-40 mL IntraVENous Q8H    sodium chloride (NS) flush 5-40 mL  5-40 mL IntraVENous PRN    oxyCODONE IR (ROXICODONE) tablet 5 mg  5 mg Oral Q4H PRN    naloxone (NARCAN) injection 0.4 mg  0.4 mg IntraVENous PRN    heparin (porcine) injection 5,000 Units  5,000 Units SubCUTAneous Q8H    morphine injection 4 mg  4 mg IntraVENous Q3H PRN    gabapentin (NEURONTIN) capsule 300 mg  300 mg Oral ACD    Followed by    Italo Tripp ON 9/26/2022] gabapentin (NEURONTIN) capsule 300 mg  300 mg Oral DIALYSIS MON, WED & FRI    insulin glargine (LANTUS) injection 40 Units  40 Units SubCUTAneous QHS    senna-docusate (PERICOLACE) 8.6-50 mg per tablet 1 Tablet  1 Tablet Oral DAILY    acetaminophen (TYLENOL) tablet 1,000 mg  1,000 mg Oral BID    insulin lispro (HUMALOG) injection 10 Units  10 Units SubCUTAneous TIDAC    insulin lispro (HUMALOG) injection   SubCUTAneous AC&HS    glucose chewable tablet 16 g  4 Tablet Oral PRN    glucagon (GLUCAGEN) injection 1 mg  1 mg IntraMUSCular PRN    dextrose 10 % infusion 0-250 mL  0-250 mL IntraVENous PRN    hydrALAZINE (APRESOLINE) 20 mg/mL injection 20 mg  20 mg IntraVENous Q4H PRN    losartan (COZAAR) tablet 100 mg  100 mg Oral DAILY    albumin human 25% (BUMINATE) solution 25 g  25 g IntraVENous DIALYSIS PRN    sodium chloride (NS) flush 5-40 mL  5-40 mL IntraVENous Q8H    sodium chloride (NS) flush 5-40 mL  5-40 mL IntraVENous PRN    ondansetron (ZOFRAN ODT) tablet 4 mg  4 mg Oral Q8H PRN    Or    ondansetron (ZOFRAN) injection 4 mg  4 mg IntraVENous Q6H PRN    acetaminophen (TYLENOL) tablet 650 mg  650 mg Oral Q4H PRN    Or    acetaminophen (TYLENOL) suppository 650 mg  650 mg Rectal Q6H PRN    heparin (porcine) 1,000 unit/mL injection 2,200 Units  2,200 Units InterCATHeter DIALYSIS PRN    And    heparin (porcine) 1,000 unit/mL injection 2,100 Units  2,100 Units InterCATHeter DIALYSIS PRN       Care Plan discussed with: Patient/Family and Nurse    Total time spent with patient: 30 minutes.     Charlie Samuel MD

## 2022-09-24 NOTE — PROGRESS NOTES
Problem: Falls - Risk of  Goal: *Absence of Falls  Description: Document Malia Don Fall Risk and appropriate interventions in the flowsheet. Outcome: Progressing Towards Goal  Note: Fall Risk Interventions:  Mobility Interventions: Communicate number of staff needed for ambulation/transfer, Patient to call before getting OOB, Utilize walker, cane, or other assistive device    Mentation Interventions: Adequate sleep, hydration, pain control, Update white board    Medication Interventions: Patient to call before getting OOB    Elimination Interventions: Call light in reach, Patient to call for help with toileting needs    History of Falls Interventions: Bed/chair exit alarm         Problem: Pain  Goal: *Control of Pain  Outcome: Progressing Towards Goal     Problem: Pressure Injury - Risk of  Goal: *Prevention of pressure injury  Description: Document See Scale and appropriate interventions in the flowsheet.   Outcome: Progressing Towards Goal  Note: Pressure Injury Interventions:       Moisture Interventions: Absorbent underpads, Check for incontinence Q2 hours and as needed, Minimize layers    Activity Interventions: Pressure redistribution bed/mattress(bed type), PT/OT evaluation    Mobility Interventions: Pressure redistribution bed/mattress (bed type), PT/OT evaluation    Nutrition Interventions: Offer support with meals,snacks and hydration    Friction and Shear Interventions: Minimize layers, Lift sheet

## 2022-09-25 LAB
GLUCOSE BLD STRIP.AUTO-MCNC: 132 MG/DL (ref 65–117)
GLUCOSE BLD STRIP.AUTO-MCNC: 145 MG/DL (ref 65–117)
GLUCOSE BLD STRIP.AUTO-MCNC: 193 MG/DL (ref 65–117)
GLUCOSE BLD STRIP.AUTO-MCNC: 254 MG/DL (ref 65–117)
SERVICE CMNT-IMP: ABNORMAL

## 2022-09-25 PROCEDURE — 74011000250 HC RX REV CODE- 250: Performed by: ORTHOPAEDIC SURGERY

## 2022-09-25 PROCEDURE — 74011636637 HC RX REV CODE- 636/637: Performed by: ORTHOPAEDIC SURGERY

## 2022-09-25 PROCEDURE — 82962 GLUCOSE BLOOD TEST: CPT

## 2022-09-25 PROCEDURE — 65270000029 HC RM PRIVATE

## 2022-09-25 PROCEDURE — 74011250636 HC RX REV CODE- 250/636: Performed by: ORTHOPAEDIC SURGERY

## 2022-09-25 PROCEDURE — 74011250637 HC RX REV CODE- 250/637: Performed by: FAMILY MEDICINE

## 2022-09-25 PROCEDURE — 74011250637 HC RX REV CODE- 250/637: Performed by: ORTHOPAEDIC SURGERY

## 2022-09-25 RX ADMIN — Medication 10 UNITS: at 07:30

## 2022-09-25 RX ADMIN — Medication 2 UNITS: at 12:19

## 2022-09-25 RX ADMIN — FUROSEMIDE 80 MG: 40 TABLET ORAL at 08:30

## 2022-09-25 RX ADMIN — FUROSEMIDE 80 MG: 40 TABLET ORAL at 17:16

## 2022-09-25 RX ADMIN — HEPARIN SODIUM 5000 UNITS: 5000 INJECTION, SOLUTION INTRAVENOUS; SUBCUTANEOUS at 14:54

## 2022-09-25 RX ADMIN — SODIUM CHLORIDE, PRESERVATIVE FREE 10 ML: 5 INJECTION INTRAVENOUS at 22:00

## 2022-09-25 RX ADMIN — SODIUM CHLORIDE, PRESERVATIVE FREE 10 ML: 5 INJECTION INTRAVENOUS at 05:48

## 2022-09-25 RX ADMIN — HEPARIN SODIUM 5000 UNITS: 5000 INJECTION, SOLUTION INTRAVENOUS; SUBCUTANEOUS at 20:59

## 2022-09-25 RX ADMIN — Medication 2 UNITS: at 17:17

## 2022-09-25 RX ADMIN — Medication 2 UNITS: at 21:11

## 2022-09-25 RX ADMIN — Medication 10 UNITS: at 17:16

## 2022-09-25 RX ADMIN — HEPARIN SODIUM 5000 UNITS: 5000 INJECTION, SOLUTION INTRAVENOUS; SUBCUTANEOUS at 07:01

## 2022-09-25 RX ADMIN — ACETAMINOPHEN 1000 MG: 500 TABLET, FILM COATED ORAL at 17:16

## 2022-09-25 RX ADMIN — SODIUM CHLORIDE, PRESERVATIVE FREE 10 ML: 5 INJECTION INTRAVENOUS at 14:00

## 2022-09-25 RX ADMIN — SODIUM CHLORIDE, PRESERVATIVE FREE 10 ML: 5 INJECTION INTRAVENOUS at 05:47

## 2022-09-25 RX ADMIN — LOSARTAN POTASSIUM 50 MG: 50 TABLET, FILM COATED ORAL at 08:30

## 2022-09-25 RX ADMIN — HYDRALAZINE HYDROCHLORIDE 20 MG: 20 INJECTION INTRAMUSCULAR; INTRAVENOUS at 21:12

## 2022-09-25 RX ADMIN — INSULIN GLARGINE 40 UNITS: 100 INJECTION, SOLUTION SUBCUTANEOUS at 21:11

## 2022-09-25 RX ADMIN — ACETAMINOPHEN 1000 MG: 500 TABLET, FILM COATED ORAL at 08:30

## 2022-09-25 RX ADMIN — POLYETHYLENE GLYCOL 3350 17 G: 17 POWDER, FOR SOLUTION ORAL at 08:30

## 2022-09-25 RX ADMIN — ROSUVASTATIN CALCIUM 10 MG: 10 TABLET, FILM COATED ORAL at 20:58

## 2022-09-25 RX ADMIN — DOCUSATE SODIUM 50MG AND SENNOSIDES 8.6MG 1 TABLET: 8.6; 5 TABLET, FILM COATED ORAL at 08:30

## 2022-09-25 RX ADMIN — Medication 10 UNITS: at 12:18

## 2022-09-25 NOTE — PROGRESS NOTES
[address] [facility] Adult  Hospitalist Group                       Hospitalist Progress Note          Kathryn Lee MD  Please call  and page for questions. Call physician on-call through the  7pm-7am        Daily Progress Note: 9/25/2022    Primary care provider:aMry Jackson MD    Date of admission: 9/16/2022  9:32 AM    Admission summery and hospital course:  58 y.o. male who presents with fall and ankle fracture. Patient has past medical history of end-stage renal disease on hemodialysis. He said he struck his head he complains of left ankle injury. There was an injury over the medial malleolus . Right now patient complains of severe ankle pain was sedated for left ankle stabilization. Denies any chest pain, shortness of breath, fevers or recent illnesses. No headache. He does receive heparin for dialysis. He was due for dialysis today and typically receives it Monday Wednesday and Friday. Last dialysis was Wednesday. He sees Dr. lAbert Andrade. Nephrology consulted for resuming hemodialysis. Subjective:   Patient said he is feeling better today. He feels like he will have bowel movement very soon. Patient had dry cough this morning. Patient is tolerating diet, he is passing flatus. Patient will let us know if he continues to cough. Patient denied any other acute issue today. Assessment/Plan:     Left ankle dislocation, left distal fibula fracture s/p mechanical fall  S/p Closed reduction in ED  ORIF on 9/20/20  Strict ice/elevation left leg for edema control. Weight bearing restriction NWB left leg x 3 months. Pain control . Mild leukocytosis likely reactive 2/2 this- resolved. Repeat labs at a.m. ESRD on HD MWF anemia of CKD   MWF at [facility], Nephrology following.      Volume overload:  Improving attempting to pull 4-4.5 kg each HD     Panic attacks, anxiety 9/18  Likely 2/2 the abrupt discontinuation of chronic gabapentin which can cause irritability, tachycardia, diaphoresis (ie anxiety), therefore resumed gabapentin at 300 bid for now     Mechanical fall, \"legs buckled\"  Denies any pre-syncopal symptoms  CT head no acute abnormality  Carotid Doppler w/o b/l ICA stenosis, VA s patent,   Incidental large left thyroid nodule - f/up PCP for thyroid US [facility]NA[facility]      Hypertension  BP on the lower side of normal, decrease the losartan dose with parameter and monitor. DM2, a1c 12.8, uncontrolled  SSI, Lantus, Humalog- cont to adjust PRN     Hypothyroidism  TSH low at 0.25, continue to hold Levothyroxine     Hyponatremia  Likely SIADH from ankle fracture, stress, pain, anxiety, resolving. Monitor. Constipation  -Start with daily polyethylene glycol and monitor with other bowel regimen including Dulcolax. DIET: ADULT DIET Regular; Safety Tray. ISOLATION PRECAUTIONS: There are currently no Active Isolations  CODE STATUS: Full code   DVT PROPHYLAXIS: SCDs  FUNCTIONAL STATUS PRIOR TO HOSPITALIZATION:   Fully active and ambulatory; able to carry on all self-care without restriction. Ambulatory status/function: By self   EARLY MOBILITY ASSESSMENT:   Recommend as per orthopedics, PT OT. Communication: I communicated with patient/family and RN. ANTICIPATED DISCHARGE: When insurance authorization is completed.     ANTICIPATED DISPOSITION: To SNF       Review of Systems:     Review of Systems:  Symptom  Y/N  Comments   Symptom  Y/N  Comments    Fever/Chills  n    Chest Pain  n    Poor Appetite   n   Edema   n    Cough  n   Abdominal Pain  n     Sputum  n   Joint Pain      SOB/DE SANTIAGO  n   Pruritis/Rash      Nausea/vomit  n   Tolerating PT/OT      Diarrhea  n   Tolerating Diet      Constipation  y   Other      Could not obtain due to:         Objective:   Physical Exam:     Visit Vitals  BP (!) 150/69 (BP 1 Location: Right upper arm, BP Patient Position: At rest;Lying)   Pulse 81   Temp 97.9 °F (36.6 °C)   Resp 17   Ht 6' (1.829 m)   Wt 135.3 kg (298 lb 4.5 oz)   SpO2 94%   BMI 40.45 kg/m²    O2 Flow Rate (L/min): 0 l/min O2 Device: None (Room air)    Temp (24hrs), Av.1 °F (36.7 °C), Min:97.9 °F (36.6 °C), Max:98.5 °F (36.9 °C)    No intake/output data recorded. No intake/output data recorded. General:  Alert, cooperative, no distress, appears stated age. Patient is comfortable at bed. He is not at any distress. I did not observe him any cough while I was at the room. Lungs:   Clear to auscultation bilaterally. Chest wall:  No tenderness or deformity. Heart:  Regular rate and rhythm, S1, S2 normal, no murmur. Abdomen:   Obese, soft, non-tender. Bowel sounds normal.    Extremities: Left ankle is covered with dry and clean dressing and bandage. Extremities normal, atraumatic, no cyanosis or edema. Pulses: 2+ and symmetric all extremities. Skin: Skin color, texture, turgor normal. No rashes or lesions   Neurologic: Patient has clear voice. He follows command. Data Review:       Recent Days:  No results for input(s): WBC, HGB, HCT, PLT, HGBEXT, HCTEXT, PLTEXT in the last 72 hours. No results for input(s): NA, K, CL, CO2, GLU, BUN, CREA, CA, MG, PHOS, ALB, TBIL, ALT, INR, INREXT in the last 72 hours. No lab exists for component: SGOT  No results for input(s): PH, PCO2, PO2, HCO3, FIO2 in the last 72 hours.     24 Hour Results:  Recent Results (from the past 24 hour(s))   GLUCOSE, POC    Collection Time: 22  4:16 PM   Result Value Ref Range    Glucose (POC) 169 (H) 65 - 117 mg/dL    Performed by Jeancarlos Adan    GLUCOSE, POC    Collection Time: 22 10:19 PM   Result Value Ref Range    Glucose (POC) 141 (H) 65 - 117 mg/dL    Performed by Melina choudhary RN    GLUCOSE, POC    Collection Time: 22  8:15 AM   Result Value Ref Range    Glucose (POC) 132 (H) 65 - 117 mg/dL    Performed by Yayo Bill    GLUCOSE, POC    Collection Time: 22 11:13 AM   Result Value Ref Range    Glucose (POC) 193 (H) 65 - 117 mg/dL Performed by Clarisa Agrawal RN        Problem List:  Problem List as of 9/25/2022 Never Reviewed            Codes Class Noted - Resolved    Bimalleolar fracture of left ankle ICD-10-CM: D85.002T  ICD-9-CM: 824.4  9/17/2022 - Present        Syncope ICD-10-CM: R55  ICD-9-CM: 780.2  9/16/2022 - Present           Medications reviewed  Current Facility-Administered Medications   Medication Dose Route Frequency    polyethylene glycol (MIRALAX) packet 17 g  17 g Oral DAILY    bisacodyL (DULCOLAX) suppository 10 mg  10 mg Rectal DAILY PRN    losartan (COZAAR) tablet 50 mg  50 mg Oral DAILY    rosuvastatin (CRESTOR) tablet 10 mg  10 mg Oral QHS    furosemide (LASIX) tablet 80 mg  80 mg Oral BID    hydrOXYzine HCL (ATARAX) tablet  mg   mg Oral Q8H PRN    sodium chloride (NS) flush 5-40 mL  5-40 mL IntraVENous Q8H    sodium chloride (NS) flush 5-40 mL  5-40 mL IntraVENous PRN    oxyCODONE IR (ROXICODONE) tablet 5 mg  5 mg Oral Q4H PRN    naloxone (NARCAN) injection 0.4 mg  0.4 mg IntraVENous PRN    heparin (porcine) injection 5,000 Units  5,000 Units SubCUTAneous Q8H    morphine injection 4 mg  4 mg IntraVENous Q3H PRN    [START ON 9/26/2022] gabapentin (NEURONTIN) capsule 300 mg  300 mg Oral DIALYSIS MON, WED & FRI    insulin glargine (LANTUS) injection 40 Units  40 Units SubCUTAneous QHS    senna-docusate (PERICOLACE) 8.6-50 mg per tablet 1 Tablet  1 Tablet Oral DAILY    acetaminophen (TYLENOL) tablet 1,000 mg  1,000 mg Oral BID    insulin lispro (HUMALOG) injection 10 Units  10 Units SubCUTAneous TIDAC    insulin lispro (HUMALOG) injection   SubCUTAneous AC&HS    glucose chewable tablet 16 g  4 Tablet Oral PRN    glucagon (GLUCAGEN) injection 1 mg  1 mg IntraMUSCular PRN    dextrose 10 % infusion 0-250 mL  0-250 mL IntraVENous PRN    hydrALAZINE (APRESOLINE) 20 mg/mL injection 20 mg  20 mg IntraVENous Q4H PRN    albumin human 25% (BUMINATE) solution 25 g  25 g IntraVENous DIALYSIS PRN    sodium chloride (NS) flush 5-40 mL  5-40 mL IntraVENous Q8H    sodium chloride (NS) flush 5-40 mL  5-40 mL IntraVENous PRN    ondansetron (ZOFRAN ODT) tablet 4 mg  4 mg Oral Q8H PRN    Or    ondansetron (ZOFRAN) injection 4 mg  4 mg IntraVENous Q6H PRN    acetaminophen (TYLENOL) tablet 650 mg  650 mg Oral Q4H PRN    Or    acetaminophen (TYLENOL) suppository 650 mg  650 mg Rectal Q6H PRN    heparin (porcine) 1,000 unit/mL injection 2,200 Units  2,200 Units InterCATHeter DIALYSIS PRN    And    heparin (porcine) 1,000 unit/mL injection 2,100 Units  2,100 Units InterCATHeter DIALYSIS PRN       Care Plan discussed with: Patient/Family and Nurse    Total time spent with patient: 30 minutes.     Cande Robison MD

## 2022-09-26 LAB
ALBUMIN SERPL-MCNC: 2.3 G/DL (ref 3.5–5)
ALBUMIN/GLOB SERPL: 0.5 {RATIO} (ref 1.1–2.2)
ALP SERPL-CCNC: 124 U/L (ref 45–117)
ALT SERPL-CCNC: 6 U/L (ref 12–78)
ANION GAP SERPL CALC-SCNC: 10 MMOL/L (ref 5–15)
AST SERPL-CCNC: 16 U/L (ref 15–37)
BASOPHILS # BLD: 0 K/UL (ref 0–0.1)
BASOPHILS NFR BLD: 1 % (ref 0–1)
BILIRUB SERPL-MCNC: 0.3 MG/DL (ref 0.2–1)
BUN SERPL-MCNC: 80 MG/DL (ref 6–20)
BUN/CREAT SERPL: 10 (ref 12–20)
CALCIUM SERPL-MCNC: 10.1 MG/DL (ref 8.5–10.1)
CHLORIDE SERPL-SCNC: 93 MMOL/L (ref 97–108)
CO2 SERPL-SCNC: 26 MMOL/L (ref 21–32)
CREAT SERPL-MCNC: 8.07 MG/DL (ref 0.7–1.3)
DIFFERENTIAL METHOD BLD: ABNORMAL
EOSINOPHIL # BLD: 0.2 K/UL (ref 0–0.4)
EOSINOPHIL NFR BLD: 2 % (ref 0–7)
ERYTHROCYTE [DISTWIDTH] IN BLOOD BY AUTOMATED COUNT: 12 % (ref 11.5–14.5)
GLOBULIN SER CALC-MCNC: 4.7 G/DL (ref 2–4)
GLUCOSE BLD STRIP.AUTO-MCNC: 150 MG/DL (ref 65–117)
GLUCOSE BLD STRIP.AUTO-MCNC: 151 MG/DL (ref 65–117)
GLUCOSE BLD STRIP.AUTO-MCNC: 173 MG/DL (ref 65–117)
GLUCOSE BLD STRIP.AUTO-MCNC: 196 MG/DL (ref 65–117)
GLUCOSE SERPL-MCNC: 199 MG/DL (ref 65–100)
HCT VFR BLD AUTO: 28.1 % (ref 36.6–50.3)
HGB BLD-MCNC: 9.7 G/DL (ref 12.1–17)
IMM GRANULOCYTES # BLD AUTO: 0 K/UL (ref 0–0.04)
IMM GRANULOCYTES NFR BLD AUTO: 1 % (ref 0–0.5)
LYMPHOCYTES # BLD: 1.4 K/UL (ref 0.8–3.5)
LYMPHOCYTES NFR BLD: 22 % (ref 12–49)
MCH RBC QN AUTO: 31.1 PG (ref 26–34)
MCHC RBC AUTO-ENTMCNC: 34.5 G/DL (ref 30–36.5)
MCV RBC AUTO: 90.1 FL (ref 80–99)
MONOCYTES # BLD: 0.5 K/UL (ref 0–1)
MONOCYTES NFR BLD: 7 % (ref 5–13)
NEUTS SEG # BLD: 4.4 K/UL (ref 1.8–8)
NEUTS SEG NFR BLD: 67 % (ref 32–75)
NRBC # BLD: 0 K/UL (ref 0–0.01)
NRBC BLD-RTO: 0 PER 100 WBC
PHOSPHATE SERPL-MCNC: 7.6 MG/DL (ref 2.6–4.7)
PLATELET # BLD AUTO: 207 K/UL (ref 150–400)
PMV BLD AUTO: 9.7 FL (ref 8.9–12.9)
POTASSIUM SERPL-SCNC: 4.5 MMOL/L (ref 3.5–5.1)
PROT SERPL-MCNC: 7 G/DL (ref 6.4–8.2)
RBC # BLD AUTO: 3.12 M/UL (ref 4.1–5.7)
SERVICE CMNT-IMP: ABNORMAL
SODIUM SERPL-SCNC: 129 MMOL/L (ref 136–145)
WBC # BLD AUTO: 6.5 K/UL (ref 4.1–11.1)

## 2022-09-26 PROCEDURE — 74011636637 HC RX REV CODE- 636/637: Performed by: ORTHOPAEDIC SURGERY

## 2022-09-26 PROCEDURE — 84100 ASSAY OF PHOSPHORUS: CPT

## 2022-09-26 PROCEDURE — 74011250636 HC RX REV CODE- 250/636: Performed by: ORTHOPAEDIC SURGERY

## 2022-09-26 PROCEDURE — P9047 ALBUMIN (HUMAN), 25%, 50ML: HCPCS | Performed by: ORTHOPAEDIC SURGERY

## 2022-09-26 PROCEDURE — 74011250637 HC RX REV CODE- 250/637: Performed by: ORTHOPAEDIC SURGERY

## 2022-09-26 PROCEDURE — 90935 HEMODIALYSIS ONE EVALUATION: CPT

## 2022-09-26 PROCEDURE — 51798 US URINE CAPACITY MEASURE: CPT

## 2022-09-26 PROCEDURE — 85025 COMPLETE CBC W/AUTO DIFF WBC: CPT

## 2022-09-26 PROCEDURE — 65270000029 HC RM PRIVATE

## 2022-09-26 PROCEDURE — 74011000250 HC RX REV CODE- 250: Performed by: ORTHOPAEDIC SURGERY

## 2022-09-26 PROCEDURE — 82962 GLUCOSE BLOOD TEST: CPT

## 2022-09-26 PROCEDURE — 80053 COMPREHEN METABOLIC PANEL: CPT

## 2022-09-26 PROCEDURE — 36415 COLL VENOUS BLD VENIPUNCTURE: CPT

## 2022-09-26 RX ORDER — TAMSULOSIN HYDROCHLORIDE 0.4 MG/1
0.8 CAPSULE ORAL DAILY
Status: DISCONTINUED | OUTPATIENT
Start: 2022-09-26 | End: 2022-10-03 | Stop reason: HOSPADM

## 2022-09-26 RX ADMIN — HEPARIN SODIUM 5000 UNITS: 5000 INJECTION, SOLUTION INTRAVENOUS; SUBCUTANEOUS at 22:13

## 2022-09-26 RX ADMIN — SODIUM CHLORIDE, PRESERVATIVE FREE 10 ML: 5 INJECTION INTRAVENOUS at 06:00

## 2022-09-26 RX ADMIN — Medication 2 UNITS: at 17:26

## 2022-09-26 RX ADMIN — SODIUM CHLORIDE, PRESERVATIVE FREE 10 ML: 5 INJECTION INTRAVENOUS at 14:57

## 2022-09-26 RX ADMIN — HYDRALAZINE HYDROCHLORIDE 20 MG: 20 INJECTION INTRAMUSCULAR; INTRAVENOUS at 22:20

## 2022-09-26 RX ADMIN — SODIUM CHLORIDE, PRESERVATIVE FREE 10 ML: 5 INJECTION INTRAVENOUS at 22:00

## 2022-09-26 RX ADMIN — Medication 3 UNITS: at 06:28

## 2022-09-26 RX ADMIN — HEPARIN SODIUM 5000 UNITS: 5000 INJECTION, SOLUTION INTRAVENOUS; SUBCUTANEOUS at 14:56

## 2022-09-26 RX ADMIN — HEPARIN SODIUM 5000 UNITS: 5000 INJECTION, SOLUTION INTRAVENOUS; SUBCUTANEOUS at 06:12

## 2022-09-26 RX ADMIN — ALBUMIN (HUMAN) 25 G: 0.25 INJECTION, SOLUTION INTRAVENOUS at 10:15

## 2022-09-26 RX ADMIN — HYDROXYZINE HYDROCHLORIDE 25 MG: 25 TABLET ORAL at 22:57

## 2022-09-26 RX ADMIN — GABAPENTIN 300 MG: 300 CAPSULE ORAL at 15:00

## 2022-09-26 RX ADMIN — HEPARIN SODIUM 2200 UNITS: 1000 INJECTION INTRAVENOUS; SUBCUTANEOUS at 13:53

## 2022-09-26 RX ADMIN — Medication 10 UNITS: at 06:13

## 2022-09-26 RX ADMIN — INSULIN GLARGINE 40 UNITS: 100 INJECTION, SOLUTION SUBCUTANEOUS at 22:13

## 2022-09-26 RX ADMIN — ACETAMINOPHEN 1000 MG: 500 TABLET, FILM COATED ORAL at 17:26

## 2022-09-26 RX ADMIN — HEPARIN SODIUM 2100 UNITS: 1000 INJECTION INTRAVENOUS; SUBCUTANEOUS at 13:53

## 2022-09-26 RX ADMIN — OXYCODONE 5 MG: 5 TABLET ORAL at 12:03

## 2022-09-26 RX ADMIN — FUROSEMIDE 80 MG: 40 TABLET ORAL at 17:26

## 2022-09-26 RX ADMIN — ROSUVASTATIN CALCIUM 10 MG: 10 TABLET, FILM COATED ORAL at 22:13

## 2022-09-26 NOTE — PROGRESS NOTES
Bedside shift change report given to Feli Jacobo RN (oncoming nurse) by [facility], RN (offgoing nurse). Report included the following information SBAR, Kardex, ED Summary, OR Summary, Procedure Summary, Intake/Output, MAR, Recent Results, and Med Rec Status.

## 2022-09-26 NOTE — PROGRESS NOTES
Chart reviewed. Attempted to see pt, however, currently at HD. Discussed with RN. Will follow-up later as able and medically appropriate. Thanks.     Jay Gutierrez, DPT, PT

## 2022-09-26 NOTE — PROGRESS NOTES
Problem: Falls - Risk of  Goal: *Absence of Falls  Description: Document Sandro Limon Fall Risk and appropriate interventions in the flowsheet.   Outcome: Progressing Towards Goal  Note: Fall Risk Interventions:  Mobility Interventions: PT Consult for assist device competence, PT Consult for mobility concerns, Patient to call before getting OOB, OT consult for ADLs, Bed/chair exit alarm, Utilize walker, cane, or other assistive device    Mentation Interventions: Adequate sleep, hydration, pain control, Update white board    Medication Interventions: Utilize gait belt for transfers/ambulation, Teach patient to arise slowly, Evaluate medications/consider consulting pharmacy, Patient to call before getting OOB    Elimination Interventions: Bed/chair exit alarm, Call light in reach, Patient to call for help with toileting needs, Toileting schedule/hourly rounds, Urinal in reach    History of Falls Interventions: Bed/chair exit alarm, Utilize gait belt for transfer/ambulation, Door open when patient unattended

## 2022-09-26 NOTE — PROGRESS NOTES
Nephrology Progress Note  Cherelle Quinones  Date of Admission : 9/16/2022    CC:  Follow up for ESRD       Assessment and Plan     ESRD on HD:  - MWF at [facility]  - Access: (R) permacath  - HD today per schedule     Urinary retention   - start flomax     HTN:  - cont current meds    Anemia of CKD:  - 9/20 Hgb stable without ADONIS can resume outpatient has his HD clinic    Secondary HPT    L distal fibula fracture:  - s/p ORIF 9/20    DM2  Obesity  Noncompliance       Interval History:  Seen and examined. Needed straight cath for retention   Volume overload improving   No new sx     Current Medications: all current  Medications have been eviewed in EPIC  Review of Systems: Pertinent items are noted in HPI. Objective:  Vitals:    Vitals:    09/25/22 2044 09/25/22 2208 09/26/22 0341 09/26/22 0802   BP:  (!) 145/85 138/82 121/60   Pulse: 83 82 85 (!) 105   Resp: 16 16 16 17   Temp: 98.4 °F (36.9 °C) 98.2 °F (36.8 °C) 98.5 °F (36.9 °C) 97.7 °F (36.5 °C)   SpO2: 94% 95% 98% 99%   Weight:       Height:         Intake and Output:  No intake/output data recorded.   09/24 1901 - 09/26 0700  In: -   Out: 1000 [Urine:1000]    Physical Examination:    General: NAD,Conversant   Neck:  Supple, no mass  Resp:  Lungs CTA B/L, no wheezing , normal respiratory effort  CV:  RRR,  no murmur or rub, 2+ LE edema  GI:  Soft, NT, + Bowel sounds, no hepatosplenomegaly  Neurologic:  Non focal  Psych:             AAO x 3 appropriate affect  Skin:  No Rash- left foot splint dressing D/I  Access:           RIJ PC c/d/i      Recent Labs     09/26/22  0140   *   K 4.5   CL 93*   CO2 26   *   BUN 80*   CREA 8.07*   CA 10.1   PHOS 7.6*   ALB 2.3*   ALT 6*       Recent Labs     09/26/22  0139   WBC 6.5   HGB 9.7*   HCT 28.1*          No results found for: SDES  No results found for: CULT  Recent Results (from the past 24 hour(s))   GLUCOSE, POC    Collection Time: 09/25/22 11:13 AM   Result Value Ref Range    Glucose (POC) 193 (H) 65 - 117 mg/dL    Performed by Sonia Robins RN    GLUCOSE, POC    Collection Time: 09/25/22  4:30 PM   Result Value Ref Range    Glucose (POC) 145 (H) 65 - 117 mg/dL    Performed by Sonia Robins RN    GLUCOSE, POC    Collection Time: 09/25/22  9:03 PM   Result Value Ref Range    Glucose (POC) 254 (H) 65 - 117 mg/dL    Performed by TAYA Frank    CBC WITH AUTOMATED DIFF    Collection Time: 09/26/22  1:39 AM   Result Value Ref Range    WBC 6.5 4.1 - 11.1 K/uL    RBC 3.12 (L) 4.10 - 5.70 M/uL    HGB 9.7 (L) 12.1 - 17.0 g/dL    HCT 28.1 (L) 36.6 - 50.3 %    MCV 90.1 80.0 - 99.0 FL    MCH 31.1 26.0 - 34.0 PG    MCHC 34.5 30.0 - 36.5 g/dL    RDW 12.0 11.5 - 14.5 %    PLATELET 112 991 - 914 K/uL    MPV 9.7 8.9 - 12.9 FL    NRBC 0.0 0  WBC    ABSOLUTE NRBC 0.00 0.00 - 0.01 K/uL    NEUTROPHILS 67 32 - 75 %    LYMPHOCYTES 22 12 - 49 %    MONOCYTES 7 5 - 13 %    EOSINOPHILS 2 0 - 7 %    BASOPHILS 1 0 - 1 %    IMMATURE GRANULOCYTES 1 (H) 0.0 - 0.5 %    ABS. NEUTROPHILS 4.4 1.8 - 8.0 K/UL    ABS. LYMPHOCYTES 1.4 0.8 - 3.5 K/UL    ABS. MONOCYTES 0.5 0.0 - 1.0 K/UL    ABS. EOSINOPHILS 0.2 0.0 - 0.4 K/UL    ABS. BASOPHILS 0.0 0.0 - 0.1 K/UL    ABS. IMM. GRANS. 0.0 0.00 - 0.04 K/UL    DF AUTOMATED     METABOLIC PANEL, COMPREHENSIVE    Collection Time: 09/26/22  1:40 AM   Result Value Ref Range    Sodium 129 (L) 136 - 145 mmol/L    Potassium 4.5 3.5 - 5.1 mmol/L    Chloride 93 (L) 97 - 108 mmol/L    CO2 26 21 - 32 mmol/L    Anion gap 10 5 - 15 mmol/L    Glucose 199 (H) 65 - 100 mg/dL    BUN 80 (H) 6 - 20 MG/DL    Creatinine 8.07 (H) 0.70 - 1.30 MG/DL    BUN/Creatinine ratio 10 (L) 12 - 20      GFR est AA 8 (L) >60 ml/min/1.73m2    GFR est non-AA 7 (L) >60 ml/min/1.73m2    Calcium 10.1 8.5 - 10.1 MG/DL    Bilirubin, total 0.3 0.2 - 1.0 MG/DL    ALT (SGPT) 6 (L) 12 - 78 U/L    AST (SGOT) 16 15 - 37 U/L    Alk.  phosphatase 124 (H) 45 - 117 U/L    Protein, total 7.0 6.4 - 8.2 g/dL    Albumin 2.3 (L) 3.5 - 5.0 g/dL Globulin 4.7 (H) 2.0 - 4.0 g/dL    A-G Ratio 0.5 (L) 1.1 - 2.2     PHOSPHORUS    Collection Time: 09/26/22  1:40 AM   Result Value Ref Range    Phosphorus 7.6 (H) 2.6 - 4.7 MG/DL   GLUCOSE, POC    Collection Time: 09/26/22  6:12 AM   Result Value Ref Range    Glucose (POC) 173 (H) 65 - 117 mg/dL    Performed by TAYA Frank      The above assessment and plan reviewed with Dr. Aleksandra Hein. Riki Fajardo MD  [address]  Phone - [phone]   Fax - [phone]  [email or web address] normal

## 2022-09-26 NOTE — ADT AUTH CERT NOTES
Ankle Fracture, Closed, Open Reduction, Internal Fixation (ORIF) - Care Day 6 (9/22/2022) by Latisha Gomez       Review Status Review Entered   Completed 9/23/2022 1303       Created By   [address] [place] [address] Day: 6 Care Date: 9/22/2022 Level of Care: Inpatient Floor    Guideline Day 1    Level Of Care    ( ) OR to recovery room to discharge    9/23/2022 13:03:59 EDT by Latisha Gomez      Orthopedic    Clinical Status    (X) * Procedure completed    9/23/2022 13:03:59 EDT by Latisha Gomez      Left ankle trimalleolar ankle fracture open reduction and internal fixation, Left ankle syndesmosis fixation, Left ankle fluoroscopic inversion stress evaluation intraoperatively    ( ) * Adequate supported ambulation    9/23/2022 13:03:59 EDT by Latisha Gomez      Not indicated at this time    (X) * Pain absent or managed    9/23/2022 13:03:59 EDT by Latisha Gomez      Pain still present but improved with oral pain meds    (X) * Other injuries requiring inpatient care absent    9/23/2022 13:03:59 EDT by Latisha Gomez      No indications for other injuries    (X) * No evidence of postoperative or surgical site infection    9/23/2022 13:03:59 EDT by Latisha Gomez      splint in well padded and not too tight, proximal calf swollen but soft, moves toes with encouragement, brisk capillary refill. No infection noted    ( ) * Discharge plans and education understood    Activity    ( ) * Ambulatory or acceptable for next level of care    9/23/2022 13:03:59 EDT by Latisha Gomez      OT/PT Eval:  Pt is far below his baseline and would benefit from further rehab at d/c.   Activity level up to total, maximum assist needed    Routes    (X) * Oral hydration    9/23/2022 13:03:59 EDT by Latisha Gomez      Patient can tolerate oral hydration without issue or fluid restriction noted    (X) * Oral medications or regimen acceptable for next level of care    9/23/2022 13:03:59 EDT by Latisha Gomez Tylenol 1000mg PO BID, Lasix 80mg PO BID, Neurontin 300mg PO qd, Losartan 100mg PO qd, Roxicodone 5mg PO q4h PRN x2, Crestor 20mg PO hs    (X) * Oral diet or acceptable for next level of care    9/23/2022 13:03:59 EDT by Eleuterio De Leon      Regular adult diet    Interventions    (X) Cast, splint, or brace    9/23/2022 13:03:59 EDT by Eleuterio De Leon      splint in well padded and not too tight    (X) Gait training    9/23/2022 13:03:59 EDT by Eleuterio De Leon      With PT/OT    * Milestone   Additional Notes   DATE: 09/22/22      Pertinent Updates:   Post-op left Ankle pain 8-9/10       Vitals:   99.1 °F (37.3 °C) 73 92/52 30 89% RA       Abnl/Pertinent Labs/Radiology/Diagnostic Studies:   GLUCOSE,FAST - POC: 140 (H), 219 (H), 161 (H), 150 (H)      Physical Exam:   General: cooperative, no acute distress     EENT: EOMI. Anicteric sclerae. MMM   Resp:CTA bilaterally, no wheezing or rales. No accessory muscle use   CV: RRR, No audible murmur   GI: Soft, Non distended, Non tender   Neurologic: Alert and oriented, normal speech, ENAMORADO   Psych: Not anxious, not agitated   Skin: No rashes. No jaundice     Ext: splint in well padded and not too tight, proximal calf swollen but soft, moves toes with encouragement, brisk capillary refill   Access: MARTIR BATES c/d/i      MD Consults/Assessments & Plans:   INTERNAL MED:   Left ankle dislocation, left distal fibula fracture s/p mechanical fall   S/p Closed reduction in ED   ORIF on 9/20   Per ortho: Ice/elevate;  If he is physically able, may work on bed to chair transfers with PT; strict NWB LLE   Pain control    Mild leukocytosis likely reactive 2/2       ESRD on HD MWF anemia of CKD    MWF at [facility]   D/c yariel unclear why on lasix 80 bid will d/w nephrology       Volume overload:   improving attempting to pull 4-4.5 kg each HD      Panic attacks, anxiety    Likely 2/2 the abrupt discontinuation of chronic gabapentin which can cause irritability, tachycardia, diaphoresis (ie anxiety), therefore resumed gabapentin at 300 bid for now. Mechanical fall, \"legs buckled\"   Denies any pre-syncopal symptoms   Carotid Doppler w/o b/l ICA stenosis, VAs patent, incidental large left thyroid nodule - f/up PCP for thyroid US FNAC       HTN   Cont home ARB   IV PRN       DM2, a1c 12.8, uncontrolled   SSI, Lantus, Humalog -  cont to adjust PRN       H/o hypothyroidism   TSH low at 0.25   Holding Levothyroxine       Prophylaxis: Heparin ppx   Anticipated Disposition: Home   Anticipated Discharge: 24hrs      ORTHOPEDIC SURGERY:   Strict ice/elevation left leg for edema control   DVT prophylaxisHeparin   Weight bearing restriction NWB left leg x 3 months   Pain Control: Morphine 4mg q 3 hours seems to have been beneficial as pain control improved   Dispo: anticipate he will need SNF placement   OK for discharge to SNF from ortho standpoint when otherwise stable       NEPHROLOGY:   ESRD on HD:   - MWF at [facility]   - 3.6 kg removed yesterday stopped early due to foot pain   - plan to remove [place] and stable for d/c [facility]   - Plans to transfer to SNF per notes: if discharged Friday he will need HD prior       Volume overload:   - attempting to pull 4-4.5 kg each HD      HTN:   - cont current meds       Secondary HPT      DM2   Obesity   Noncompliance      Medications:   Heparin 5000 units SC q8h, Lantus 40 units SC hs, SSI SC qid 2 units x2  3 units x1, Humalog 10 units SC TID      Orders:   Elevate extremities - elevate Elevate foot for swelling and pain control. Apply ice to specified area    WEIGHT BEARING: SPECIFY strict NWB left leg    Neurovascular checks   Apply/Maintain SCD       PT Notes: The patient scored 1 on the Tinetti outcome measure which is indicative of high fall risk. Patient will be followed by physical therapy: 5 times a week to address goals. Recommendation for discharge: Therapy 3 hours per day 5-7 days per week       OT Notes:     The patient presents with increase anxiety, NWB L LE, pain, impaired activity tolerance, generalized weakness, and decrease independence with self-care tasks. Current Level of Function Impacting Discharge: upto total assist   Patient will be followed by occupational therapy 5 times a week to address goals. Recommendation for discharge:    Therapy 3 hours per day 5-7 days per week      CM Notes:   RUR- Low  14%    DISPOSITION: The disposition plan is IPR vs. SNF; pending medical progression   SNF Pending:     -[id number]    Dialysis: MWF at 12:30pm [address]: Diana Art needed, Transport to [facility], Placement               Ankle Fracture, Closed, Open Reduction, Internal Fixation (ORIF) - Care Day 5 (9/21/2022) by Apurva Valle       Review Status Review Entered   Completed 9/23/2022 1242       Created By   [address] [place] [address] Day: 5 Care Date: 9/21/2022 Level of Care: Inpatient Floor    Guideline Day 1    Level Of Care    ( ) OR to recovery room to discharge    9/23/2022 12:42:42 EDT by Apurva Valle      Orthopedic    Clinical Status    (X) * Procedure completed    9/23/2022 12:42:42 EDT by Apurva Valle      Left ankle trimalleolar ankle fracture open reduction and internal fixation, Left ankle syndesmosis fixation, Left ankle fluoroscopic inversion stress evaluation intraoperatively    ( ) * Adequate supported ambulation    9/23/2022 12:42:42 EDT by Apurva Valle      Not indicated at this time    ( ) * Pain absent or managed    9/23/2022 12:42:42 EDT by Apurva Valle      Left ankle pain 10/10    ( ) * Other injuries requiring inpatient care absent    9/23/2022 12:42:42 EDT by Apurva Valle      Left ankle trimalleolar closed displaced fracture, Left ankle medial pressure wound measuring about 2 cm in diameter. Diabetes mellitus type 2 with medical complications to include kidney failure, peripheral neuropathy, vascular disease. (X) * No evidence of postoperative or surgical site infection    9/23/2022 12:42:42 EDT by Yancey Gilford:  left short leg splint in place and in good condition  WOUND: splint not removed to examine incisions  SWELLING: mild swelling in toes as expected  No infection noted    ( ) * Discharge plans and education understood    Activity    ( ) * Ambulatory or acceptable for next level of care    9/23/2022 12:42:42 EDT by Manny Trujillo activity (PT/OT) due to increased pain, confusion and inability to follow commands    Routes    (X) * Oral hydration    9/23/2022 12:42:42 EDT by Tyrel Bo      Patient can tolerate oral hydration without issue or fluid restriction noted    (X) * Oral medications or regimen acceptable for next level of care    9/23/2022 12:42:42 EDT by Tyrel Bo      Tylenol 1000mg PO BID, Neurontin 300mg PO qd, Roxicodone 5mg PO q4h PRN x1, Crestor 20mg PO hs    (X) * Oral diet or acceptable for next level of care    9/23/2022 12:42:42 EDT by Tyrel Bo      Regular adult diet    Interventions    (X) Cast, splint, or brace    9/23/2022 12:42:42 EDT by Tyrel Bo      left short leg splint in place and in good condition    (X) Gait training    9/23/2022 12:42:42 EDT by Tyrel Bo      with PT/OT    * Milestone   Additional Notes   DATE: 09/21/22      Pertinent Updates:   Patient complains of severe ankle pain (10/10) was sedated for left ankle stabilization. Now with IV morhpine       Vitals:   99 °F (37.2 °C) 67 153/79 17 94% RA       Abnl/Pertinent Labs/Radiology/Diagnostic Studies:   GLUCOSE,FAST - POC: 160 (H), 186 (H), 119 (H)      Physical Exam:   GENERAL: 65yo male, awake, moaning in pain   Resp:CTA bilaterally, no wheezing or rales.  No accessory muscle use   CV: RRR, No audible murmur   GI: Soft, Non distended, Non tender   DRESSING:  left short leg splint in place and in good condition   WOUND: splint not removed to examine incisions SWELLING: mild swelling in toes as expected   NEUROLOGICAL: unable/unwilling to move toes   VASCULAR: brisk capillary refill in toes   Access: MARTIR BATES c/d/i      MD Consults/Assessments & Plans:   INTERNAL MED:   Left ankle dislocation, left distal fibula fracture s/p mechanical fall   S/p Closed reduction in ED   ORIF on 9/20   Per ortho: Ice/elevate; If he is physically able, may work on bed to chair transfers with PT; strict NWB LLE   Pain control    Mild leukocytosis likely reactive 2/2       ESRD on HD MWF   Anemia of CKD   Unclear why on lasix 80 bid and gabapentin 600 bid at home (max dose of gabapentin is 300mg tiw after HD)      Panic attacks, anxiety    Likely 2/2 the abrupt discontinuation of chronic gabapentin which can cause irritability, tachycardia, diaphoresis (ie anxiety), therefore resumed gabapentin at 300 bid for now.        Mechanical fall, \"legs buckled\"   Denies any pre-syncopal symptoms   Carotid Doppler w/o b/l ICA stenosis, VAs patent, incidental large left thyroid nodule - f/up PCP for thyroid US FNAC       HTN   Cont home ARB   IV PRN       DM2, a1c 12.8, uncontrolled   SSI, Lantus, Humalog -  cont to adjust PRN       H/o hypothyroidism   TSH low at 0.25   Holding Levothyroxine       Hyponatremia    likely SIADH from ankle fracture, stress, pain, anxiety   Monitor       Prophylaxis: Heparin ppx   Anticipated Disposition: Home   Anticipated Discharge: Greater than 48 hours      ORTHOPEDIC SURGERY:   Strict ice/elevation left leg for edema control   DVT prophylaxisHeparin   Weight bearing restriction NWB left leg x 3 months   Pain Control:increasing significant pain , ordered morphine 4mg q 3 hours   Dispo: anticipate he will need SNF placement      NEPHROLOGY:   ESRD on HD:   - MWF at [facility]   - HD now      Volume overload:   - HD      HTN:   - cont current meds       Anemia of CKD:   - hold ADONIS for now    - CBC today       Secondary HPT       L distal fibula fracture:   - s/p ORIF 9/20       DM2   Obesity   Noncompliance      Medications:   Heparin 5000 units SC q8h, Lantus 40 units SC hs, SSI SC qid 2 units x1, Humalog 10 units SC TID, Morphine 4mg IV q3h PRN x1, Morphine 2mg IV q4h PRN x1       Orders:   Elevate extremities - elevate Elevate foot for swelling and pain control. Apply ice to specified area    WEIGHT BEARING: SPECIFY strict NWB left leg    Neurovascular checks   Apply/Maintain SCD         Ankle Fracture, Closed, Open Reduction, Internal Fixation (ORIF) - Care Day 4 (9/20/2022) by Arcenio Patel       Review Status Review Entered   Completed 9/23/2022 1208       Created By   Arcenio Patel      Criteria Review      Care Day: 4 Care Date: 9/20/2022 Level of Care: Inpatient Floor    Guideline Day 1    Level Of Care    ( ) OR to recovery room to discharge    9/23/2022 12:08:27 EDT by Arcenio Patel      Orthopedic unit    Clinical Status    (X) * Procedure completed    9/23/2022 12:08:27 EDT by Arcenio Patel      S/P Left ankle trimalleolar open reduction internal fixation with syndesmosis fixation.  Irrigation and debridement of open skin wound medial left ankle    ( ) * Adequate supported ambulation    9/23/2022 12:08:27 EDT by Arcenio Patel      Not indicated at this time    ( ) * Pain absent or managed    9/23/2022 12:08:27 EDT by Linette Washington left Ankle pain 6/10    ( ) * Other injuries requiring inpatient care absent    9/23/2022 12:08:27 EDT by Daphne Lees fracture and displacement, Skin breakdown due to presence from fracture medial malleolus    (X) * No evidence of postoperative or surgical site infection    9/23/2022 12:08:27 EDT by Librado Avinaoked is clean, dry, and intact    ( ) * Discharge plans and education understood    Activity    ( ) * Ambulatory or acceptable for next level of care    9/23/2022 12:08:27 EDT by Gt Acosta    ( ) * Oral hydration    9/23/2022 12:08:27 EDT by Ana Donnelly      NS 50 mL/hr IV    (X) * Oral medications or regimen acceptable for next level of care    9/23/2022 12:08:27 EDT by Ana Donnelly      Tylenol 1000mg PO BID, Neurontin 300mg PO BID, Losartan 100mg PO qd    ( ) * Oral diet or acceptable for next level of care    9/23/2022 12:08:27 EDT by Ana Donnelly      NPO    Interventions    (X) Cast, splint, or brace    9/23/2022 12:08:27 EDT by Ana Donnelly      Left lower extremity splint is dry and intact    (X) Gait training    9/23/2022 12:08:27 EDT by Ana Donnelly      with PT/OT    * Milestone   Additional Notes   DATE: 09/20/22      Pertinent Updates:   S/P ORIF       OP NOTES:   Pre-Op Diagnosis: LEFT ANKLE FRACTURE       Post-Op Diagnosis:    1. LEFT ANKLE TRIMALLEOLAR FRACTURE,DISPLACED, ? OPEN FRACTURE MEDIAL MALLEOLUS VERSUS SKIN BREAKDOWN DUE TO PRESSURE FROM FRACTURE MEDIAL MALLEOLUS. 2. DIABETES WITH RENAL FAILURE, PERIPHERAL NEUROPATHY, VASCULAR DISEASE. 3. MORBID OBESITY. Procedure(s):   1. LEFT ANKLE TRIMALLEOLAR OPEN REDUCTION INTERNAL FIXATION WITH SYNDESMOSIS FIXATION. 2. IRRIGATION AND DEBRIDEMENT OF OPEN SKIN WOUND MEDIAL LEFT ANKLE. Findings: There is a medial skin wound near open to fracture with bleeding from site and skin slough over medial malleolus. Appears due to persistent valgus ankle and loss of reduction in splint applied. There is poor skin quality related to underlying vascular disease with reddish discoloration to leg and woody appearance to skin. There is persistent bruising around ankle. Skin swelling improved with +wrinkle sign.        Vitals:   98.8 F (37.1 C) 88 162/64 18 95 NC 3 l/min      Abnl/Pertinent Labs/Radiology/Diagnostic Studies:   GLUCOSE,FAST - POC: 179 (H), 130 (H), 155 (H), 212 (H)   RBC: 3.44 (L)   HGB: 10.9 (L)   HCT: 31.3 (L)   Sodium: 134 (L)   Glucose: 218 (H)   BUN: 41 (H)   Creatinine: 4.98 (H)   BUN/Creatinine ratio: 8 (L)   Calcium: 10.2 (H)   Phosphorus: 7.3 (H)   GFR est non-AA: 12 (L)   GFR est AA: 14 (L)   Albumin: 2.7 (L)      Physical Exam:   General:  Alert, cooperative, no acute distress     EENT: EOMI. Anicteric sclerae. MMM   Resp: CTA bilaterally, no wheezing or rales. No accessory muscle use   CV:  RRR, No audible murmur   GI: Soft, Non distended, Non tender   Neurologic: Alert and oriented, normal speech, ENAMORADO   Extremities: LLE wrapped, RLE no edema or cyanosis   Psych: Not anxious, not agitated   Skin: No rashes. No jaundice        Access: MARTIR BATES c/d/i      MD Consults/Assessments & Plans:   INTERNAL MED:   Left ankle dislocation, left distal fibula fracture s/p mechanical fall   S/p Closed reduction in ED   Per ortho: Ice/elevate;  If he is physically able, may work on bed to chair transfers with PT; strict NWB LLE   Pain control PRN    Plan definitive fixation this week with foot/ankle    Mild leukocytosis likely reactive        ESRD on HD MWF   Anemia of CKD   Unclear why on lasix 80 bid and gabapentin 600 bid at home (max dose of gabapentin is 300mg tiw after HD)      Panic attacks, anxiety    Likely 2/2 the abrupt discontinuation of chronic gabapentin which can cause irritability, tachycardia, diaphoresis (ie anxiety), therefore resumed gabapentin at 300 bid for now; I have ordered a taper to get to the appropriate regimen in ESRD       Mechanical fall, \"legs buckled\"   Denies any pre-syncopal symptoms   No indication for syncope workup or imaging   Could check orthostats with PT (though NWB LLE)   Carotid Doppler w/o b/l ICA stenosis, VAs patent, incidental large left thyroid nodule - f/up PCP for thyroid US FNAC       HTN   Cont home ARB   IV PRN       DM2, a1c 12.8, uncontrolled   SSI, Lantus, Humalog -  cont to adjust PRN       H/o hypothyroidism   TSH low at 0.25   A hyperthyroid state can also contribute to the anxiety above   Interestingly, pt admits to taking his 25mcg LT4 only every other day or every 3 days, whenever he remembers   So may not need LT4 at all? Will hold for now   F/up with PCP for recheck TSH       Hyponatremia    likely SIADH from ankle fracture, stress, pain, anxiety   1500 FR   Monitor       Prophylaxis: Heparin ppx   Anticipated Disposition: Home   Anticipated Discharge: Greater than 48 hours      ORTHOPEDIC SURGERY:   Remain n.p.o.    Have educated patient on need to elevate limb to help reduce swelling but he declines at this time   Pain medication as needed       NEPHROLOGY:   ESRD on HD:   - MWF at [facility]   - HD tomorrow per routin      Volume overload:   - HD      HTN:   - resume home meds       Anemia of CKD:   - hold ADONIS for now    - will likely need this post op       Secondary HPT       L distal fibula fracture:   - for ORIF today       DM2   Obesity   Noncompliance      Medications:   Lantus 40 units SC hs, SSI SC qid 2 units x2, Zofran 4mg IV q6h PRN x1, Fentanyl citrate 25mcg IV q4h PRN x4, Fentanyl 100mcg IV PRN x1, Heparin 5000 units SC q8h, Versed 2mg IV PRN x1       Orders:   Elevate extremities - elevate left ankle at all times    Apply ice to specified area    WEIGHT BEARING: SPECIFY strict NWB left leg         Ankle Fracture, Closed, Open Reduction, Internal Fixation (ORIF) - Clinical Indications for Procedure by Digna Partida       Review Status Review Entered   Completed 9/23/2022 1148       Created By   Digna Partida      Criteria Review      Clinical Indications for Procedure    Most Recent : Digna Partida Most Recent Date: 9/23/2022 11:48:30 EDT    (X) Procedure is indicated for  1 or more  of the following  (1) (2) (3) (4) (5) (6) (7):       (X) Medial malleolar stress fracture (8) (9)       9/23/2022 11:48:30 EDT by Digna Partida         Open fracture medial malleolus versus skin breakdown due to pressure from fracture medial mallelous       (X) Bimalleolar or trimalleolar fracture (11) (12)       9/23/2022 11:48:30 EDT by Digna Partida         Trimalleolar fracture        Ankle Fracture, Open, Open Reduction, Internal Fixation (ORIF) - Clinical Indications for Procedure by Ezequiel Wild       Review Status Review Entered   Completed 9/23/2022 1144       Created By   Ezequiel Wild      Criteria Review      Clinical Indications for Procedure    Most Recent : Ezequiel Wild Most Recent Date: 9/23/2022 11:44:22 EDT    ( ) Procedure is indicated for  1 or more  of the following  (1) (2) (3) (4) (5) (6):       ( ) Treatment for open ankle fracture       9/23/2022 11:40:17 EDT by Ezequiel Wild         Left ankle fx, Open fx medial malleolus versus skin breakdown due to pressure from fracture medial malleolus   Additional Notes   ERROR GUIDELINE:   Please disregard        Ankle Dislocation, Closed or Open Reduction - Care Day 3 (9/19/2022) by Ezequiel Wild       Review Status Review Entered   Completed 9/23/2022 1130       Created By   [address] [place] [address] Day: 3 Care Date: 9/19/2022 Level of Care: Inpatient Floor    Guideline Day 2    Level Of Care    ( ) Floor to discharge    9/23/2022 11:30:21 EDT by Stephanie [facility]ise [facility]    Clinical Status    (X) * Procedure completed    9/23/2022 11:30:21 EDT by Ezequiel Wild      Joint/Fracture Closed Reduction    (X) * Adequate supported ambulation    9/23/2022 11:30:21 EDT by Ezequiel Wild      Ambulatory Assistance from PT/OT  Pateint states that he uses a walker at all times at baseline.     ( ) * Pain absent or managed    9/23/2022 11:30:21 EDT by Ezequiel Wild      Left ankle pain 10/10, Will give IV pain medication option as his oxycodone does not seem to be providing significant relief at this time    (X) * No evidence of postoperative or surgical site infection    9/23/2022 11:30:21 EDT by Geronimo Hassan is clean, dry, and intact    ( ) * Discharge plans and education understood    Activity    ( ) * Ambulatory or acceptable for next level of care    9/23/2022 11:30:21 EDT by Serene Life per Orthopedic surgeon. OT/PT Eval: Patient received in severe pain with LLE could hardly tolerate bed mobility, scooting to [address] with x3 assist, and refused additional activity    Routes    ( ) * Oral hydration    9/23/2022 11:30:21 EDT by Rae Lees      NPO    (X) * Oral medications or regimen acceptable for next level of care    9/23/2022 11:30:21 EDT by Rae Lees      Tylenol 1000mg PO BID, Neurontin 300mg PO BID, Roxicodone 10mg PO q4h PRN x1 Roxicodone 5mg PO q4h PRN x1    ( ) * Oral diet or acceptable for next level of care    9/23/2022 11:30:21 EDT by Rae Lees      NPO    Interventions    (X) Possible DVT prophylaxis    9/23/2022 11:30:21 EDT by Rae Lees      Heparin 5000 units SC q8h    (X) Gait training    9/23/2022 11:30:21 EDT by Rae Lees      with PT/OT    (X) Cast or splint    9/23/2022 11:30:21 EDT by Rae Lees      Left lower extremity splint is intact    * Milestone   Additional Notes   DATE: 09/19/22      Pertinent Updates:   Patient expressing that he has significant pain in his left ankle. States that he lost his footing while walking with his walker the other night and tripped sustaining his injury. Left ankle pain 10/10   NPO after midnight for ORIF tomorrow      Vitals:   98.4 F (36.9 C) 87 158/56 18 91% NC 2 l/min      Abnl/Pertinent Labs/Radiology/Diagnostic Studies:   GLUCOSE,FAST - POC: 234 (H), 231 (H), 180 (H)   Sodium: 130 (L)   Chloride: 94 (L)   Glucose: 234 (H)   BUN: 69 (H)   Creatinine: 6.98 (H)   BUN/Creatinine ratio: 10 (L)   Phosphorus: 8.9 (H)   GFR est non-AA: 8 (L)   GFR est AA: 10 (L)   Albumin: 2.8 (L)      Physical Exam:   General:  Alert, cooperative, no acute distress     EENT: EOMI. Anicteric sclerae. MMM   Resp: CTA bilaterally, no wheezing or rales.  No accessory muscle use   CV:  RRR, No audible murmur   GI: Soft, Non distended, Non tender   Neurologic: Alert and oriented, normal speech, ENAMORADO Extremities: LLE wrapped, RLE no edema or cyanosis   Psych: Not anxious, not agitated   Skin: No rashes. No jaundice        Access: MARTIR BATES c/d/i      MD Consults/Assessments & Plans:   INTERNAL MED:   Left ankle dislocation, left distal fibula fracture s/p mechanical fall   S/p Closed reduction in ED   Per ortho: Ice/elevate; If he is physically able, may work on bed to chair transfers with PT; strict NWB LLE   Pain control PRN    Plan definitive fixation this week with foot/ankle    Mild leukocytosis likely reactive        ESRD on HD MWF   Anemia of CKD   Unclear why on lasix 80 bid and gabapentin 600 bid at home (max dose of gabapentin is 300mg tiw after HD)      Panic attacks, anxiety    Likely 2/2 the abrupt discontinuation of chronic gabapentin which can cause irritability, tachycardia, diaphoresis (ie anxiety), therefore resumed gabapentin at 300 bid for now; I have ordered a taper to get to the appropriate regimen in ESRD       Mechanical fall, \"legs buckled\"   Denies any pre-syncopal symptoms   No indication for syncope workup or imaging   Could check orthostats with PT (though NWB LLE)   Carotid Doppler w/o b/l ICA stenosis, VAs patent, incidental large left thyroid nodule - f/up PCP for thyroid US       HTN   Cont home ARB   IV PRN       DM2, a1c 12.8, uncontrolled   SSI, Lantus, Humalog -  cont to adjust PRN       H/o hypothyroidism   TSH low at 0.25   A hyperthyroid state can also contribute to the anxiety above   Interestingly, pt admits to taking his 25mcg LT4 only every other day or every 3 days, whenever he remembers   So may not need LT4 at all? Will hold for now   F/up with PCP for recheck TSH       Hyponatremia    likely SIADH from ankle fracture, stress, pain, anxiety   1500 FR   Monitor       Prophylaxis: Heparin ppx   Anticipated Disposition: Home   Anticipated Discharge: Greater than 48 hours      ORTHOPEDIC SURGERY:   Patient needs open reduction of left distal fibular fracture.  Plan for OR Tuesday afternoon. Consent for open reduction internal fixation of left ankle fracture. N.p.o. after midnight   Will give IV pain medication option as his oxycodone does not seem to be providing significant relief at this time   Strict elevation of the left lower extremity   Bedrest   Medical management per primary team       NEPHROLOGY:   ESRD on HD:   - MWF at [facility]   - HD now   - UF as able       Volume overload:   - UF 3-4kg as tolerated       HTN:   - resume home meds       Anemia of CKD:   - hold ADONIS       Secondary HPT       L distal fibula fracture:   - per ortho       DM2   Obesity   Noncompliance      Medications:   SSI SC 5 units x1  7 units x1  3 units x2, Humalog 10 units SC TID, Losartan 100mg PO qd, Neurontin 300mg PO BID, Heparin 5000 units SC q8h, Lantus 30 units SC hs      Orders:   Elevate extremities - elevate left ankle at all times    Apply ice to specified area    WEIGHT BEARING: SPECIFY strict NWB left leg       CM Notes:    Transition of Care Plan: likely home when medically ready pending medical/therapy recommendations   RUR: 13% low   Dialysis: MWF at 12:30pm [facility]        Ankle Dislocation, Closed or Open Reduction - Care Day 2 (9/18/2022) by Emmanuel Patterson       Review Status Review Entered   Completed 9/23/2022 1052       Created By   Emmanuel Patterson      Criteria Review      Care Day: 2 Care Date: 9/18/2022 Level of Care: Inpatient Floor    Guideline Day 1    Level Of Care    ( ) OR to floor or discharge    9/23/2022 10:52:10 EDT by Emmanuel Patterson      Orthopedic    Clinical Status    (X) * Clinical Indications met    9/23/2022 10:52:10 EDT by Emmanuel Patterson      twisted L ankle resulting in bimalleolar fracture/disclocation. Left ankle dislocation, left distal fibula fracture secondary to fall. Closed reduction performed in ED. Has skin tear, but fracture is not open.     Activity    ( ) Bed rest    9/23/2022 10:52:10 EDT by Emmanuel Patterson      May work on bed to chair transfers with PT    Routes    (X) IV medications    9/23/2022 10:52:10 EDT by Emmanuel       Apresoline 20mg IV q4h PRN x2    Medications    (X) Multimodal analgesia as needed    9/23/2022 10:52:10 EDT by Emmanuel       Tylenol 1000mg PO BID, Fentanyl citrate 25mcg IV q4h PRN x1, Roxicodone 10mg PO q4h PRN x2    * Milestone   Additional Notes   DATE: 09/18/22      Pertinent Updates:   States he had a rough night with a lot of panic attacks and nightmares, he wonders if it's from all the pain meds he is getting   He denies any h/o panic attacks. Left Ankle pain 6/10       Vitals:   98 F (36.7 C) 83 180/66 17 95% NC 2l/min      Abnl/Pertinent Labs/Radiology/Diagnostic Studies:   RBC: 3.55 (L)   HGB: 11.3 (L)   HCT: 31.8 (L)   Sodium: 129 (L)   Chloride: 94 (L)   Glucose: 245 (H)   BUN: 51 (H)   Creatinine: 5.42 (H)   BUN/Creatinine ratio: 9 (L)   Phosphorus: 7.1 (H)   GFR est non-AA: 11 (L)   GFR est AA: 13 (L)   Hemoglobin A1c, (calculated): 12.8 (H)   TSH: 0.25 (L)   GLUCOSE,FAST - POC: 269 (H), 348 (H), 249 (H), 279 (H)      Physical Exam:   General:  Alert, cooperative, no acute distress     EENT: EOMI. Anicteric sclerae. MMM   Resp: CTA bilaterally, no wheezing or rales. No accessory muscle use   CV:  RRR, No audible murmur   GI: Soft, Non distended, Non tender   Neurologic: Alert and oriented, normal speech, ENAMORADO   Extremities: LLE wrapped, RLE no edema or cyanosis   Psych: Mildly anxious, not agitated   Skin: No rashes. No jaundice           MD Consults/Assessments & Plans:   INTERNAL MED:   Left ankle dislocation, left distal fibula fracture s/p mechanical fall   S/p Closed reduction in ED   Per ortho: Ice/elevate;  If he is physically able, may work on bed to chair transfers with PT; strict NWB LLE   Pain control- d/t c/o panic attacks will hold IV fentanyl, continue PO percocet, add scheduled tylenol- monitor   Plan definitive fixation next week with foot/ankle   Mild leukocytosis likely reactive        ESRD on HD MWF   Anemia of CKD   S/p HD    Unclear why on lasix 80 bid and gabapentin 600 bid at home (max dose of gabapentin is 300mg tiw after HD)   HOWEVER, the abrupt discontinuation of chronic gabapentin can cause irritability, tachycardia, diaphoresis (ie anxiety), so need to wean this slowly, therefore will resume gabapentin at 300 bid for now       Mechanical fall, \"legs buckled\"   Denies any pre-syncopal symptoms   No indication for syncope workup or imaging   Could check orthostats with PT (though NWB LLE)   Carotid Doppler w/o b/l ICA stenosis, VAs patent, incidental large left thyroid nodule - f/up PCP for thyroid US       HTN   Cont home ARB   IV PRN       DM2, a1c 12.8, uncontrolled   SSI, Lantus, Humalog       H/o hypothyroidism   TSH low at 0.25   A hyperthyroid state can also contribute to the anxiety above   Interestingly, pt admits to taking his 25mcg LT4 only every other day or every 3 days, whenever he remembers   So may not need LT4 at all?  Will hold for now   F/up with PCP for recheck TSH       Hyponatremia, likely SIADH from ankle fracture, stress, pain, anxiety   1500 FR   Monitor       Prophylaxis: Heparin ppx   Anticipated Disposition: Home   Anticipated Discharge: Greater than 48 hours      Medications:   SSI SC 5 units x1  7 units x1  3 units x2, Humalog 10 units SC TID, Losartan 100mg PO qd, Neurontin 300mg PO BID, Heparin 5000 units SC q8h, Lantus 30 units SC hs      Orders:   Elevate extremities - elevate left ankle at all times    Apply ice to specified area    WEIGHT BEARING: SPECIFY strict NWB left leg       CM Notes:   RUR: 12% low   Disposition: likely home when medically ready   Dialysis: MWF at 12:30pm [facility]

## 2022-09-26 NOTE — PROGRESS NOTES
NUTRITION  Reason for Assessment: length of stay      Recommendations/Interventions/Plan:   Continue regular diet as ordered      Will rescreen per policy       Past Medical History:   Diagnosis Date    Anemia     Chronic kidney disease     Diabetes ([address]     Dorsalgia     ESRD on hemodialysis ([address]     Gastro-esophageal reflux     Heart failure ([address]     Hypertension     Obesity     Polyneuropathy     Sciatica          Pt screened for length of stay. Chart/labs/meds reviewed. Admitted with Syncope [R55]  Bimalleolar fracture of left ankle [S82.842A]    He receives HD 3 days/week, awaiting authorization to go to [facility]. Nutrition Related Findings:   Edema: No data recorded    Last BM: 09/19/22, Soft    Skin: dialysis catheter      Current Nutrition Therapies:  Diet: regular  Supplements: none  Meal intake: No data found. Supplement intake: No data found.       Weight Hx:  Wt Readings from Last 10 Encounters:   09/23/22 135.3 kg (298 lb 4.5 oz)         Estimated Nutrition Needs:   Energy:   18-20 kcals/kg Wt used:  135 kg  Protein:   1-1.1 gm pro/kg Wt used:   135 kg  Fluid:   1 ml/kcal      Recent Labs     09/26/22  0140   *   BUN 80*   CREA 8.07*   *   K 4.5   CL 93*   CO2 26   CA 10.1   PHOS 7.6*       Recent Labs     09/26/22  1111 09/26/22  0612 09/25/22  2103 09/25/22  1630 09/25/22  1113 09/25/22  0815 09/24/22  2219 09/24/22  1616 09/24/22  1132 09/24/22  0625 09/23/22  2128 09/23/22  1708   GLUCPOC 151* 173* 254* 145* 193* 132* 141* 169* 152* 155* 149* 232*       Lab Results   Component Value Date/Time    Hemoglobin A1c 12.8 (H) 09/18/2022 01:23 AM    Hemoglobin A1c 7.7 (H) 11/30/2011 09:00 AM         Walt Carlin RD, CSP  Available via [employer]

## 2022-09-26 NOTE — PROGRESS NOTES
[facility] Adult  Hospitalist Group                       Hospitalist Progress Note          Bean Hope MD  Please call  and page for questions. Call physician on-call through the  7pm-7am        Daily Progress Note: 9/26/2022    Primary care provider:Oc Jackson MD    Date of admission: 9/16/2022  9:32 AM    Admission summery and hospital course:  58 y.o. male who presents with fall and ankle fracture. Patient has past medical history of end-stage renal disease on hemodialysis. He said he struck his head he complains of left ankle injury. There was an injury over the medial malleolus . Right now patient complains of severe ankle pain was sedated for left ankle stabilization. Denies any chest pain, shortness of breath, fevers or recent illnesses. No headache. He does receive heparin for dialysis. He was due for dialysis today and typically receives it Monday Wednesday and Friday. Last dialysis was Wednesday. He sees Dr. Delfino Florez. Nephrology consulted for resuming hemodialysis. Subjective:     No active issues awaiting SNF  Dialysis Monday Wednesday Friday at [address] right permacath hemodialysis today as per schedule nephrology following    Assessment/Plan:     Left ankle dislocation, left distal fibula fracture s/p mechanical fall  S/p Closed reduction in ED  ORIF on 9/20/20  Strict ice/elevation left leg for edema control. Weight bearing restriction NWB left leg x 3 months. Pain control . Mild leukocytosis likely reactive 2/2 this- resolved. Repeat labs      ESRD on HD MWF anemia of CKD   MWF at [facility], Nephrology following.      Volume overload:  Improving attempting to pull 4-4.5 kg each HD     Panic attacks, anxiety 9/18-- now resolved   Likely 2/2 the abrupt discontinuation of chronic gabapentin which can cause irritability, tachycardia, diaphoresis (ie anxiety), therefore resumed gabapentin at 300 bid for now     Mechanical fall, \"legs buckled\"  Denies any pre-syncopal symptoms  CT head no acute abnormality  Carotid Doppler w/o b/l ICA stenosis, VA s patent,   Incidental large left thyroid nodule - f/up PCP for thyroid US [facility]      Hypertension  BP on the lower side of normal, decrease the losartan dose with parameter and monitor. DM2, a1c 12.8, uncontrolled  SSI, Lantus, Humalog- cont to adjust PRN     Hypothyroidism  TSH low at 0.25, continue to hold Levothyroxine     Hyponatremia  Likely SIADH from ankle fracture, stress, pain, anxiety, resolving. Monitor. May correct with hemodialysis sodium 129 today     Constipation  -Start with daily polyethylene glycol and monitor with other bowel regimen including Dulcolax. DIET: ADULT DIET Regular; Safety Tray. ISOLATION PRECAUTIONS: There are currently no Active Isolations  CODE STATUS: Full code   DVT PROPHYLAXIS: SCDs  FUNCTIONAL STATUS PRIOR TO HOSPITALIZATION:   Fully active and ambulatory; able to carry on all self-care without restriction. Ambulatory status/function: By self   EARLY MOBILITY ASSESSMENT:   Recommend as per orthopedics, PT OT. Communication: I communicated with patient/family and RN. ANTICIPATED DISCHARGE: When insurance authorization is completed.     ANTICIPATED DISPOSITION: To SNF       Review of Systems:     Review of Systems:  Symptom  Y/N  Comments   Symptom  Y/N  Comments    Fever/Chills  n    Chest Pain  n    Poor Appetite   n   Edema   n    Cough  n   Abdominal Pain  n     Sputum  n   Joint Pain      SOB/DE SANTIAGO  n   Pruritis/Rash      Nausea/vomit  n   Tolerating PT/OT      Diarrhea  n   Tolerating Diet      Constipation  y   Other      Could not obtain due to:         Objective:   Physical Exam:     Visit Vitals  /63   Pulse 62   Temp 97.7 °F (36.5 °C)   Resp 18   Ht 6' (1.829 m)   Wt 135.3 kg (298 lb 4.5 oz)   SpO2 99%   BMI 40.45 kg/m²    O2 Flow Rate (L/min): 0 l/min O2 Device: None (Room air)    Temp (24hrs), Av.1 °F (36.7 °C), Min:97.7 °F (36.5 °C), Max:98.5 °F (36.9 °C)    No intake/output data recorded. 09/24 1901 - 09/26 0700  In: -   Out: 1000 [Urine:1000]    General:  Alert, cooperative, no distress, appears stated age. Patient is comfortable at bed. He is not at any distress. I did not observe him any cough while I was at the room. Lungs:   Clear to auscultation bilaterally. Chest wall:  No tenderness or deformity. Heart:  Regular rate and rhythm, S1, S2 normal, no murmur. Abdomen:   Obese, soft, non-tender. Bowel sounds normal.    Extremities: Left ankle is covered with dry and clean dressing and bandage. Extremities normal, atraumatic, no cyanosis or edema. Pulses: 2+ and symmetric all extremities. Skin: Skin color, texture, turgor normal. No rashes or lesions   Neurologic: Patient has clear voice. He follows command. Data Review:       Recent Days:  Recent Labs     09/26/22  0139   WBC 6.5   HGB 9.7*   HCT 28.1*        Recent Labs     09/26/22  0140   *   K 4.5   CL 93*   CO2 26   *   BUN 80*   CREA 8.07*   CA 10.1   PHOS 7.6*   ALB 2.3*   ALT 6*     No results for input(s): PH, PCO2, PO2, HCO3, FIO2 in the last 72 hours.     24 Hour Results:  Recent Results (from the past 24 hour(s))   GLUCOSE, POC    Collection Time: 09/25/22  4:30 PM   Result Value Ref Range    Glucose (POC) 145 (H) 65 - 117 mg/dL    Performed by Mitch Ortega RN    GLUCOSE, POC    Collection Time: 09/25/22  9:03 PM   Result Value Ref Range    Glucose (POC) 254 (H) 65 - 117 mg/dL    Performed by TAYA Frank    CBC WITH AUTOMATED DIFF    Collection Time: 09/26/22  1:39 AM   Result Value Ref Range    WBC 6.5 4.1 - 11.1 K/uL    RBC 3.12 (L) 4.10 - 5.70 M/uL    HGB 9.7 (L) 12.1 - 17.0 g/dL    HCT 28.1 (L) 36.6 - 50.3 %    MCV 90.1 80.0 - 99.0 FL    MCH 31.1 26.0 - 34.0 PG    MCHC 34.5 30.0 - 36.5 g/dL    RDW 12.0 11.5 - 14.5 %    PLATELET 569 103 - 092 K/uL    MPV 9.7 8.9 - 12.9 FL    NRBC 0.0 0  WBC ABSOLUTE NRBC 0.00 0.00 - 0.01 K/uL    NEUTROPHILS 67 32 - 75 %    LYMPHOCYTES 22 12 - 49 %    MONOCYTES 7 5 - 13 %    EOSINOPHILS 2 0 - 7 %    BASOPHILS 1 0 - 1 %    IMMATURE GRANULOCYTES 1 (H) 0.0 - 0.5 %    ABS. NEUTROPHILS 4.4 1.8 - 8.0 K/UL    ABS. LYMPHOCYTES 1.4 0.8 - 3.5 K/UL    ABS. MONOCYTES 0.5 0.0 - 1.0 K/UL    ABS. EOSINOPHILS 0.2 0.0 - 0.4 K/UL    ABS. BASOPHILS 0.0 0.0 - 0.1 K/UL    ABS. IMM. GRANS. 0.0 0.00 - 0.04 K/UL    DF AUTOMATED     METABOLIC PANEL, COMPREHENSIVE    Collection Time: 09/26/22  1:40 AM   Result Value Ref Range    Sodium 129 (L) 136 - 145 mmol/L    Potassium 4.5 3.5 - 5.1 mmol/L    Chloride 93 (L) 97 - 108 mmol/L    CO2 26 21 - 32 mmol/L    Anion gap 10 5 - 15 mmol/L    Glucose 199 (H) 65 - 100 mg/dL    BUN 80 (H) 6 - 20 MG/DL    Creatinine 8.07 (H) 0.70 - 1.30 MG/DL    BUN/Creatinine ratio 10 (L) 12 - 20      GFR est AA 8 (L) >60 ml/min/1.73m2    GFR est non-AA 7 (L) >60 ml/min/1.73m2    Calcium 10.1 8.5 - 10.1 MG/DL    Bilirubin, total 0.3 0.2 - 1.0 MG/DL    ALT (SGPT) 6 (L) 12 - 78 U/L    AST (SGOT) 16 15 - 37 U/L    Alk.  phosphatase 124 (H) 45 - 117 U/L    Protein, total 7.0 6.4 - 8.2 g/dL    Albumin 2.3 (L) 3.5 - 5.0 g/dL    Globulin 4.7 (H) 2.0 - 4.0 g/dL    A-G Ratio 0.5 (L) 1.1 - 2.2     PHOSPHORUS    Collection Time: 09/26/22  1:40 AM   Result Value Ref Range    Phosphorus 7.6 (H) 2.6 - 4.7 MG/DL   GLUCOSE, POC    Collection Time: 09/26/22  6:12 AM   Result Value Ref Range    Glucose (POC) 173 (H) 65 - 117 mg/dL    Performed by TAYA Frank    GLUCOSE, POC    Collection Time: 09/26/22 11:11 AM   Result Value Ref Range    Glucose (POC) 151 (H) 65 - 117 mg/dL    Performed by Barney Monsivais   PCT        Problem List:  Problem List as of 9/26/2022 Never Reviewed            Codes Class Noted - Resolved    Bimalleolar fracture of left ankle ICD-10-CM: J77.725C  ICD-9-CM: 824.4  9/17/2022 - Present        Syncope ICD-10-CM: R55  ICD-9-CM: 780.2  9/16/2022 - Present Medications reviewed  Current Facility-Administered Medications   Medication Dose Route Frequency    tamsulosin (FLOMAX) capsule 0.8 mg  0.8 mg Oral DAILY    polyethylene glycol (MIRALAX) packet 17 g  17 g Oral DAILY    bisacodyL (DULCOLAX) suppository 10 mg  10 mg Rectal DAILY PRN    losartan (COZAAR) tablet 50 mg  50 mg Oral DAILY    rosuvastatin (CRESTOR) tablet 10 mg  10 mg Oral QHS    furosemide (LASIX) tablet 80 mg  80 mg Oral BID    hydrOXYzine HCL (ATARAX) tablet  mg   mg Oral Q8H PRN    sodium chloride (NS) flush 5-40 mL  5-40 mL IntraVENous Q8H    sodium chloride (NS) flush 5-40 mL  5-40 mL IntraVENous PRN    oxyCODONE IR (ROXICODONE) tablet 5 mg  5 mg Oral Q4H PRN    naloxone (NARCAN) injection 0.4 mg  0.4 mg IntraVENous PRN    heparin (porcine) injection 5,000 Units  5,000 Units SubCUTAneous Q8H    morphine injection 4 mg  4 mg IntraVENous Q3H PRN    gabapentin (NEURONTIN) capsule 300 mg  300 mg Oral DIALYSIS MON, WED & FRI    insulin glargine (LANTUS) injection 40 Units  40 Units SubCUTAneous QHS    senna-docusate (PERICOLACE) 8.6-50 mg per tablet 1 Tablet  1 Tablet Oral DAILY    acetaminophen (TYLENOL) tablet 1,000 mg  1,000 mg Oral BID    insulin lispro (HUMALOG) injection 10 Units  10 Units SubCUTAneous TIDAC    insulin lispro (HUMALOG) injection   SubCUTAneous AC&HS    glucose chewable tablet 16 g  4 Tablet Oral PRN    glucagon (GLUCAGEN) injection 1 mg  1 mg IntraMUSCular PRN    dextrose 10 % infusion 0-250 mL  0-250 mL IntraVENous PRN    hydrALAZINE (APRESOLINE) 20 mg/mL injection 20 mg  20 mg IntraVENous Q4H PRN    albumin human 25% (BUMINATE) solution 25 g  25 g IntraVENous DIALYSIS PRN    sodium chloride (NS) flush 5-40 mL  5-40 mL IntraVENous Q8H    sodium chloride (NS) flush 5-40 mL  5-40 mL IntraVENous PRN    ondansetron (ZOFRAN ODT) tablet 4 mg  4 mg Oral Q8H PRN    Or    ondansetron (ZOFRAN) injection 4 mg  4 mg IntraVENous Q6H PRN    acetaminophen (TYLENOL) tablet 650 mg 650 mg Oral Q4H PRN    Or    acetaminophen (TYLENOL) suppository 650 mg  650 mg Rectal Q6H PRN    heparin (porcine) 1,000 unit/mL injection 2,200 Units  2,200 Units InterCATHeter DIALYSIS PRN    And    heparin (porcine) 1,000 unit/mL injection 2,100 Units  2,100 Units InterCATHeter DIALYSIS PRN       Care Plan discussed with: Patient/Family and Nurse    Total time spent with patient: 30 minutes.     Cornelio Mercado MD

## 2022-09-26 NOTE — PROCEDURES
Hemodialysis / [phone]    Vitals Pre Post Assessment Pre Post   BP BP: (!) 139/58 (09/26/22 0958)   159/74 LOC A&Ox4, cooperative, follows commands A&Ox4, cooperative, follows commands   HR Pulse (Heart Rate): 77 (09/26/22 0958) 90 Lungs CTA bilaterally, denies shortness of breath CTA bilaterally, denies shortness of breath   Resp Resp Rate: 18 (09/26/22 0958) 18 Cardiac Regular, S1, S2, denies chest pain Regular, S1, S2, denies chest pain   Temp Temp: 97.7 °F (36.5 °C) (09/26/22 0958) 97.7   Skin R IJ permcath R IJ permcath   Weight Pre-Dialysis Weight: 135.3 kg (298 lb 4.5 oz) (09/23/22 0820)  Edema Trace  Trace    Tele status N/A N/A Pain Pain Intensity 1: 2 (09/25/22 2005) 0     Orders   Duration: Start: 8034   End: 7364 Total: 4 hours   Dialyzer: Dialyzer/Set Up Inspection: Revaclear (09/26/22 0958)   K Bath: Dialysate K (mEq/L): 2 (09/26/22 0958)   Ca Bath: Dialysate CA (mEq/L): 2.5 (09/26/22 0958)   Na: Dialysate NA (mEq/L): 140 (09/26/22 0958)   Bicarb: Dialysate HCO3 (mEq/L): 35 (09/26/22 0958)   Target Fluid Removal: Goal/Amount of Fluid to Remove (mL): 4000 mL (as tolerated) (09/26/22 0958)     Access   Type & Location: R IJ PC : Dressing clean, dry, loose No s/s of infection. Both lumens aspirate & flush well. New sterile dressing applied today, 09/26/22. Running well at .       Comments:                                        Labs   HBsAg (Antigen) / date: 09/16/22 Negative                                              HBsAb (Antibody) / date: 09/16/22 IMMUNE   Source:    Obtained/Reviewed  Critical Results Called HGB   Date Value Ref Range Status   09/26/2022 9.7 (L) 12.1 - 17.0 g/dL Final     Potassium   Date Value Ref Range Status   09/26/2022 4.5 3.5 - 5.1 mmol/L Final     Calcium   Date Value Ref Range Status   09/26/2022 10.1 8.5 - 10.1 MG/DL Final     BUN   Date Value Ref Range Status   09/26/2022 80 (H) 6 - 20 MG/DL Final     Creatinine   Date Value Ref Range Status   09/26/2022 8.07 (H) 0.70 - 1.30 MG/DL Final     Comment:     INVESTIGATED PER DELTA CHECK PROTOCOL        Meds Given   Name Dose Route   Albumin 25% 25 g  IV   Heparin 1:1000  2200 Units venous  2100 Units arterial  IV hep lock           Adequacy / Fluid    Total Liters Process: 91.9 L   Net Fluid Removed: 1500 mL      Comments   Time Out Done:   (Time) @ 0930   Admitting Diagnosis: Syncope, Ankle fracture    Consent obtained/signed: Informed Consent Verified: Yes (09/26/22 8123)   Machine / Vandana Squibb # Machine Number: [id number] (09/26/22 7739)   Primary Nurse Rpt Pre: Michael Villavicencio RN   Primary Nurse Rpt Post: Michael Villavicencio RN   Pt Education: Ordered Dialysis Treatment    Care Plan: CKD   Pts outpatient clinic: MWF at [facility]     Tx Summary   Comments:          SBAR received from Primary RN. Pt arrived to HD suite A&Ox4. Consent signed & on file. 3521: Each catheter limb disinfected per p&p, caps removed, hubs disinfected per p&p. Each lumen aspirated for blood return and flushed with Normal Saline per policy. VSS. Dialysis Tx initiated. 1005: Lines visible and secure. 1015: SBP 70's, pt states he feels \"not so good\", UF paused, 100 mL NS bolus given, UF turned on with adjusted goal for patient tolerance. 1030: Blood pressure stable. Pt states, \"he feels better\", pt resting with eyes closed, lines visible and intact. 1130: Pt resting quietly, vital signs stable, lines patent and intact. 1200: pt resting quietly, vital signs stable, lines patent and intact. 1300: SBP 80s, pt states he feels \"hot and nauseas\", UF goal modified further for patient tolerance  1358: Tx ended. VSS. Each dialysis catheter limb disinfected per p&p, all possible blood returned per p&p, and each dialysis hub disinfected per p&p. Each lumen flushed, post dialysis catheter Heparin dwell instilled per order, and caps applied. Bed locked and in the lowest position, call bell and belongings in reach. SBAR given to Primary, RN.  Patient is stable at time of their departure. All Dialysis related medications have been reviewed.

## 2022-09-26 NOTE — PROGRESS NOTES
Occupational Therapy    Chart reviewed. Attempted to see pt, however, currently at HD. Discussed with RN. Will follow-up later as able and medically appropriate. Thanks.     Dodie Chris MS, OTR/L

## 2022-09-26 NOTE — PROGRESS NOTES
Patient complained regarding bladder pain and said, \"I want to go pee but it doesn't come out. \" Performed bladder scan which shows >478 cc. Performed Straight cath and received 1000 cc of urine out. Performed post- void bladder scan showed 35 cc. Patient relived and no complain of any pain after done with straight cath. Notified the dr through perfect serve.

## 2022-09-27 ENCOUNTER — ANESTHESIA EVENT (OUTPATIENT)
Dept: SURGERY | Age: 62
DRG: 492 | End: 2022-09-27
Payer: MEDICARE

## 2022-09-27 LAB
GLUCOSE BLD STRIP.AUTO-MCNC: 120 MG/DL (ref 65–117)
GLUCOSE BLD STRIP.AUTO-MCNC: 124 MG/DL (ref 65–117)
GLUCOSE BLD STRIP.AUTO-MCNC: 135 MG/DL (ref 65–117)
GLUCOSE BLD STRIP.AUTO-MCNC: 139 MG/DL (ref 65–117)
SERVICE CMNT-IMP: ABNORMAL

## 2022-09-27 PROCEDURE — 74011250637 HC RX REV CODE- 250/637: Performed by: INTERNAL MEDICINE

## 2022-09-27 PROCEDURE — 97530 THERAPEUTIC ACTIVITIES: CPT

## 2022-09-27 PROCEDURE — 74011250637 HC RX REV CODE- 250/637: Performed by: ORTHOPAEDIC SURGERY

## 2022-09-27 PROCEDURE — 97535 SELF CARE MNGMENT TRAINING: CPT

## 2022-09-27 PROCEDURE — 74011636637 HC RX REV CODE- 636/637: Performed by: ORTHOPAEDIC SURGERY

## 2022-09-27 PROCEDURE — 74011000250 HC RX REV CODE- 250: Performed by: ORTHOPAEDIC SURGERY

## 2022-09-27 PROCEDURE — 82962 GLUCOSE BLOOD TEST: CPT

## 2022-09-27 PROCEDURE — 65270000029 HC RM PRIVATE

## 2022-09-27 PROCEDURE — 74011250637 HC RX REV CODE- 250/637: Performed by: FAMILY MEDICINE

## 2022-09-27 PROCEDURE — 51798 US URINE CAPACITY MEASURE: CPT

## 2022-09-27 PROCEDURE — 74011250636 HC RX REV CODE- 250/636: Performed by: PHYSICIAN ASSISTANT

## 2022-09-27 RX ADMIN — INSULIN GLARGINE 40 UNITS: 100 INJECTION, SOLUTION SUBCUTANEOUS at 22:00

## 2022-09-27 RX ADMIN — ROSUVASTATIN CALCIUM 10 MG: 10 TABLET, FILM COATED ORAL at 22:00

## 2022-09-27 RX ADMIN — OXYCODONE 5 MG: 5 TABLET ORAL at 22:10

## 2022-09-27 RX ADMIN — TAMSULOSIN HYDROCHLORIDE 0.8 MG: 0.4 CAPSULE ORAL at 09:19

## 2022-09-27 RX ADMIN — SODIUM CHLORIDE, PRESERVATIVE FREE 10 ML: 5 INJECTION INTRAVENOUS at 22:00

## 2022-09-27 RX ADMIN — DOCUSATE SODIUM 50MG AND SENNOSIDES 8.6MG 1 TABLET: 8.6; 5 TABLET, FILM COATED ORAL at 09:19

## 2022-09-27 RX ADMIN — SODIUM CHLORIDE, PRESERVATIVE FREE 10 ML: 5 INJECTION INTRAVENOUS at 13:45

## 2022-09-27 RX ADMIN — OXYCODONE 5 MG: 5 TABLET ORAL at 09:19

## 2022-09-27 RX ADMIN — ACETAMINOPHEN 1000 MG: 500 TABLET, FILM COATED ORAL at 09:19

## 2022-09-27 RX ADMIN — SODIUM CHLORIDE, PRESERVATIVE FREE 10 ML: 5 INJECTION INTRAVENOUS at 13:46

## 2022-09-27 RX ADMIN — OXYCODONE 5 MG: 5 TABLET ORAL at 13:45

## 2022-09-27 RX ADMIN — MORPHINE SULFATE 4 MG: 2 INJECTION, SOLUTION INTRAMUSCULAR; INTRAVENOUS at 12:09

## 2022-09-27 RX ADMIN — SODIUM CHLORIDE, PRESERVATIVE FREE 10 ML: 5 INJECTION INTRAVENOUS at 06:00

## 2022-09-27 RX ADMIN — Medication 10 UNITS: at 17:06

## 2022-09-27 RX ADMIN — Medication 10 UNITS: at 12:35

## 2022-09-27 RX ADMIN — POLYETHYLENE GLYCOL 3350 17 G: 17 POWDER, FOR SOLUTION ORAL at 09:19

## 2022-09-27 RX ADMIN — Medication 10 UNITS: at 06:57

## 2022-09-27 RX ADMIN — LOSARTAN POTASSIUM 50 MG: 50 TABLET, FILM COATED ORAL at 09:19

## 2022-09-27 RX ADMIN — FUROSEMIDE 80 MG: 40 TABLET ORAL at 09:19

## 2022-09-27 RX ADMIN — ACETAMINOPHEN 1000 MG: 500 TABLET, FILM COATED ORAL at 17:29

## 2022-09-27 RX ADMIN — FUROSEMIDE 80 MG: 40 TABLET ORAL at 17:29

## 2022-09-27 NOTE — PROGRESS NOTES
Vascular:    Patient with ESRD on dialysis with a tunneled catheter. Left upper arm fistula placed 1 year ago but not usable due to tortuosity. Scheduled multiple times for revision but has had difficulty showing up for procedure. Will plan fistula transposition tomorrow afternoon while he is in hospital. He agrees.

## 2022-09-27 NOTE — PROGRESS NOTES
Bedside shift change report given to [facility] (oncoming nurse) by Kenneth Trujillo RN (offgoing nurse). Report included the following information SBAR, Kardex, ED Summary, OR Summary, Procedure Summary, Intake/Output, MAR, and Med Rec Status.

## 2022-09-27 NOTE — PROGRESS NOTES
Problem: Mobility Impaired (Adult and Pediatric)  Goal: *Acute Goals and Plan of Care (Insert Text)  Description: Description: FUNCTIONAL STATUS PRIOR TO ADMISSION: Patient was modified independent using a rollator for functional mobility. HOME SUPPORT: The patient lived with spouse and required some assistance with self-care tasks. Physical Therapy Goals  Initiated 9/22/2022  1. Patient will move from supine to sit and sit to supine  in bed with supervision/set-up within 7 day(s). 2.  Patient will transfer from bed to chair and chair to bed with minimal assistance/contact guard assist using the least restrictive device within 7 day(s). 3.  Patient will perform sit to stand with moderate assistance  within 7 day(s). 4.  Patient will perform BLE therex (as appropriate) for strengthening with supervision within 7 day(s). 9/27/2022 1208 by Jacque Serna PT  Outcome: Progressing Towards Goal    PHYSICAL THERAPY TREATMENT  Patient: Marium Ayers (68 y.o. male)  Date: 9/27/2022  Diagnosis: Syncope [R55]  Bimalleolar fracture of left ankle [S82.842A] <principal problem not specified>  Procedure(s) (LRB):  LEFT ANKLE OPEN REDUCTION INTERNAL FIXATION SMALL FRAG 3.5 CORTICAL SCREWS (Left) 7 Days Post-Op  Precautions: NWB, Fall (LLE)  Chart, physical therapy assessment, plan of care and goals were reviewed. ASSESSMENT  Patient continues with skilled PT services and is progressing towards goals. Pt tolerates supine to sit with mod A x2, HOB elevated and utilizing rails with patient c/o increased pain and requiring increased reinforcement to not rest L foot on the floor. Pt tolerates sitting EOB with min A to maintain NWB through leg, scooting with supervision forward to prepare to stand. Pt endorses significant fear of falling and anticipation of pain in LLE with all attempts at movement. Pt tolerates standing with bed mildly elevated, mod a x2, pulls up on RW and assist to stabilize RW.  Pt tolerates pivoting with intermittent automatic attempts to weight bear, immediate feedback w therapist foot and assistance to lift at knee. Pt able to pivot on foot to chair with heavy cues and feedback. Propped leg in elevation in recliner chair. Will continue to follow and progress as able. Current Level of Function Impacting Discharge (mobility/balance): min A-mod A x2    Other factors to consider for discharge:          PLAN :  Patient continues to benefit from skilled intervention to address the above impairments. Continue treatment per established plan of care. to address goals. Recommendation for discharge: (in order for the patient to meet his/her long term goals)  Therapy 3 hours per day 5-7 days per week    This discharge recommendation:  Has been made in collaboration with the attending provider and/or case management    IF patient discharges home will need the following DME: to be determined (TBD)       SUBJECTIVE:   Patient stated you guys enjoy hurting me.  Educated patient on role of PT, acknowledging pain however that patient cannot expect no pain. Pt rated pain 10/10 throughout session, PRN meds incoming    OBJECTIVE DATA SUMMARY:   Critical Behavior:  Neurologic State: Alert  Orientation Level: Appropriate for age  Cognition: Appropriate decision making, Appropriate safety awareness, Follows commands     Functional Mobility Training:  Bed Mobility:  Rolling: Moderate assistance;Assist x2  Supine to Sit: Moderate assistance;Assist x2;Bed Modified     Scooting: Supervision        Transfers:  Sit to Stand: Moderate assistance;Assist x2  Stand to Sit: Moderate assistance;Assist x2        Bed to Chair: Moderate assistance;Assist x2                    Balance:  Sitting: Intact  Standing: Impaired; With support  Standing - Static: Poor;Constant support  Standing - Dynamic : Poor;Constant support  Ambulation/Gait Training:           Pain Rating:  Pt reports flat 10/10 every time he is asked.  Pt pain appears stable with use of FLACC objective measurement pain scale: Face 1, Legs 1, Activity 1, Cry 2, Consolability 1 for a score of 6/10 with movement,    Face  0 No particular expression or smile 1 Occasional grimace or frown, withdrawn, uninterested 2 Frequent to constant quivering chin, clenched jaw  Legs  0 Normal position or relaxed  1 Uneasy, restless, tense    2 Kicking, or legs drawn up  Activity  0 Lying quietly/normally, moves easily 1 Squirming, shifting, back and forth, tense  2 Arched, rigid or jerking  Cry  0 No cry (awake or asleep)  1 Moans or whimpers; occasional complaint   2 Crying steadily, screams or sobs, frequent complaints  Consolability  0 Content, relaxed   1 Reassured by touching, being talked to, distractible 2 Difficult to console or comfort     Activity Tolerance:   Fair, requires rest breaks, and observed SOB with activity without desaturation    After treatment patient left in no apparent distress:   Sitting in chair and Call bell within reach    COMMUNICATION/COLLABORATION:   The patients plan of care was discussed with: Registered nurse and Case management.      Stefano Linn, PT   Time Calculation: 24 mins

## 2022-09-27 NOTE — PROGRESS NOTES
Bedside shift change report given to Atif George RN (oncoming nurse) by [facility], RN (offgoing nurse). Report included the following information SBAR, Kardex, ED Summary, OR Summary, Procedure Summary, Intake/Output, MAR, Recent Results, and Med Rec Status.

## 2022-09-27 NOTE — PROGRESS NOTES
Problem: Self Care Deficits Care Plan (Adult)  Goal: *Acute Goals and Plan of Care (Insert Text)  Description: FUNCTIONAL STATUS PRIOR TO ADMISSION: Patient was modified independent using a rollator for functional mobility. HOME SUPPORT: The patient lived with spouse and required some assistance with self-care tasks. Occupational Therapy Goals  Initiated 9/22/2022  1. Patient will perform bathing with minimal assistance/contact guard assist within 7 day(s). 2.  Patient will perform lower body dressing with moderate assistance  within 7 day(s). 3.  Patient will perform toilet transfers with moderate assistance  within 7 day(s). 4.  Patient will perform all aspects of toileting with moderate assistance  within 7 day(s). 5.  Patient will participate in upper extremity therapeutic exercise/activities with supervision/set-up for 10 minutes within 7 day(s). 6.  Patient will utilize energy conservation techniques during functional activities with verbal cues within 7 day(s). Outcome: Progressing Towards Goal   OCCUPATIONAL THERAPY TREATMENT  Patient: Carmen Zacarias (27 y.o. male)  Date: 9/27/2022  Diagnosis: Syncope [R55]  Bimalleolar fracture of left ankle [S82.842A] <principal problem not specified>  Procedure(s) (LRB):  LEFT ANKLE OPEN REDUCTION INTERNAL FIXATION SMALL FRAG 3.5 CORTICAL SCREWS (Left) 7 Days Post-Op  Precautions: NWB, Fall (LLE)  Chart, occupational therapy assessment, plan of care, and goals were reviewed. ASSESSMENT  Patient continues with skilled OT services and is progressing towards goals. [facility] at [facility] assist with bed mobility and functional transfers. Pt achieved transferring to from bed to chair using RW. Increase verbal and tactile cues to manage RW, maneuver R LE and adhere to NWB. Pt limited by fear, pain, generalized weakness and balance. Will con't to follow.         Current Level of Function Impacting Discharge (ADLs): upto total assist with ADLs    Other factors to consider for discharge: NWB L LE, dialysis, pain         PLAN :  Patient continues to benefit from skilled intervention to address the above impairments. Continue treatment per established plan of care to address goals. Recommend with staff:     Recommend next OT session: see goals    Recommendation for discharge: (in order for the patient to meet his/her long term goals)  Therapy 3 hours per day 5-7 days per week    This discharge recommendation:  A follow-up discussion with the attending provider and/or case management is planned    IF patient discharges home will need the following DME: TBD       SUBJECTIVE:   Patient stated I'm sorry to be a big baby.     OBJECTIVE DATA SUMMARY:   Cognitive/Behavioral Status:  Neurologic State: Alert  Orientation Level: Appropriate for age  Cognition: Follows commands        Safety/Judgement: Decreased insight into deficits    Functional Mobility and Transfers for ADLs:  Bed Mobility:  Rolling: Moderate assistance;Assist x2  Supine to Sit: Moderate assistance;Assist x2;Bed Modified  Scooting: Supervision    Transfers:  Sit to Stand: Moderate assistance;Assist x2  Functional Transfers  Toilet Transfer : Moderate assistance;Assist x2  Bed to Chair: Moderate assistance;Assist x2    Balance:  Sitting: Intact  Standing: Impaired; With support  Standing - Static: Poor;Constant support  Standing - Dynamic : Poor;Constant support    ADL Intervention:       Grooming  Washing Face: Set-up         Upper Body [address]: Minimum  assistance    Lower Body Dressing Assistance  Socks: Total assistance (dependent)    Toileting  Bowel Hygiene:  Total assistance (dependent)  Clothing Management: Total assistance (dependent)  Adaptive Equipment: Walker    Cognitive Retraining  Safety/Judgement: Decreased insight into deficits        Pain:  10/10p!, nurse notified    Activity Tolerance:   Fair    After treatment patient left in no apparent distress:   Sitting in chair and Call bell within reach    COMMUNICATION/COLLABORATION:   The patients plan of care was discussed with: Physical therapist and Registered nurse.      Kari Contreras OTR/L  Time Calculation: 24 mins

## 2022-09-27 NOTE — PROGRESS NOTES
Problem: Falls - Risk of  Goal: *Absence of Falls  Description: Document Solomon Columba Fall Risk and appropriate interventions in the flowsheet.   Outcome: Progressing Towards Goal  Note: Fall Risk Interventions:  Mobility Interventions: Patient to call before getting OOB    Mentation Interventions: Door open when patient unattended    Medication Interventions: Patient to call before getting OOB    Elimination Interventions: Call light in reach, Patient to call for help with toileting needs    History of Falls Interventions: Door open when patient unattended         Problem: Patient Education: Go to Patient Education Activity  Goal: Patient/Family Education  Outcome: Progressing Towards Goal     Problem: Pain  Goal: *Control of Pain  Outcome: Progressing Towards Goal  Goal: *PALLIATIVE CARE:  Alleviation of Pain  Outcome: Progressing Towards Goal     Problem: Patient Education: Go to Patient Education Activity  Goal: Patient/Family Education  Outcome: Progressing Towards Goal

## 2022-09-27 NOTE — PROGRESS NOTES
Patient complained regarding bladder pain and discomfort. Performed bladder scan at 22:30 which shows >458 cc. Performed Straight cath and received 550 cc of urine out. Performed post- void bladder scan showed 15 cc. Will notified the dr through perfect [employer] and will pass the report to the morning RN. On call doctor ( Dr. Yolette Herron) acknowledge regarding straight cath through [employer].

## 2022-09-27 NOTE — PROGRESS NOTES
Transition of Care Plan  RUR- Low 14%   DISPOSITION: The disposition plan is SNF:  SNF pending: Only facilities in network with [address] at [facility]     Dialysis: MWF at 12:30pm No[facility] Goss, [address]  Transport needed for HD; The pt does not have transport benefits or the financial resources to cover the transport expense   F/U with PCP/Specialist    Transport: [facility]    Barrier: Updated AUTH Notes Needed, Placement, AUTH, Transport for HD,   Per conversation with the rep Harry Tavera; The facility is unable to accommodate as the pt is affiliated with [facility]. She reported that they were notified when the insurance company requested updated clinicals. This cm informed her that it was not documented that the pt is affiliated with [facility] and the pt had not notified the CM team prior to sending out referrals.         CM: [address] [place],   [phone]

## 2022-09-27 NOTE — PROGRESS NOTES
Problem: Falls - Risk of  Goal: *Absence of Falls  Description: Document Trav Meier Fall Risk and appropriate interventions in the flowsheet. Outcome: Progressing Towards Goal  Note: Fall Risk Interventions:  Mobility Interventions: PT Consult for assist device competence, PT Consult for mobility concerns, Patient to call before getting OOB, OT consult for ADLs, Utilize walker, cane, or other assistive device    Mentation Interventions: Adequate sleep, hydration, pain control, Update white board    Medication Interventions: Teach patient to arise slowly, Patient to call before getting OOB, Evaluate medications/consider consulting pharmacy    Elimination Interventions: Toilet paper/wipes in reach, Stay With Me (per policy), Patient to call for help with toileting needs    History of Falls Interventions:  Investigate reason for fall, Door open when patient unattended, Utilize gait belt for transfer/ambulation

## 2022-09-27 NOTE — PROGRESS NOTES
Nephrology Progress Note  Jasson Moreno  Date of Admission : 9/16/2022    CC:  Follow up for ESRD       Assessment and Plan     ESRD on HD:  - MWF at [facility]  - Access: (R) permacath  - HD tomorrow   - appreciate vascular help with scheduling AVF revision tomorrow     Urinary retention   - continue flomax     HTN:  - cont current meds    Anemia of CKD:  - 9/20 Hgb stable without ADONIS can resume outpatient has his HD clinic    Secondary HPT    L distal fibula fracture:  - s/p ORIF 9/20    DM2  Obesity  Noncompliance       Interval History:  Seen and examined. Discussed AVF revision   He has not voided yet this morning     Current Medications: all current  Medications have been eviewed in EPIC  Review of Systems: Pertinent items are noted in HPI.     Objective:  Vitals:    Vitals:    09/26/22 2020 09/27/22 0020 09/27/22 0412 09/27/22 0812   BP: (!) 171/74 (!) 148/75 (!) 151/78 (!) 160/75   Pulse: 89 85 82 70   Resp: 16 16 18 14   Temp: 97.8 °F (36.6 °C) 97.5 °F (36.4 °C) 97.8 °F (36.6 °C) 97.9 °F (36.6 °C)   SpO2: 97% 98% 95% 97%   Weight:       Height:         Intake and Output:  09/27 0701 - 09/27 1900  In: 500 [P.O.:500]  Out: -   09/25 1901 - 09/27 0700  In: -   Out: 2479 [Urine:1550]    Physical Examination:    General: NAD,Conversant   Neck:  Supple, no mass  Resp:  Lungs CTA B/L, no wheezing , normal respiratory effort  CV:  RRR,  no murmur or rub, 2+ LE edema  GI:  Soft, NT, + Bowel sounds, no hepatosplenomegaly  Neurologic:  Non focal  Psych:             AAO x 3 appropriate affect  Skin:  No Rash- left foot splint dressing D/I  Access:           RIJ PC c/d/i      Recent Labs     09/26/22  0140   *   K 4.5   CL 93*   CO2 26   *   BUN 80*   CREA 8.07*   CA 10.1   PHOS 7.6*   ALB 2.3*   ALT 6*       Recent Labs     09/26/22  0139   WBC 6.5   HGB 9.7*   HCT 28.1*          No results found for: SDES  No results found for: CULT  Recent Results (from the past 24 hour(s))   GLUCOSE, POC Collection Time: 09/26/22 11:11 AM   Result Value Ref Range    Glucose (POC) 151 (H) 65 - 117 mg/dL    Performed by [facility]   PCT    GLUCOSE, POC    Collection Time: 09/26/22  4:33 PM   Result Value Ref Range    Glucose (POC) 196 (H) 65 - 117 mg/dL    Performed by [facility]   PCT    GLUCOSE, POC    Collection Time: 09/26/22 10:08 PM   Result Value Ref Range    Glucose (POC) 150 (H) 65 - 117 mg/dL    Performed by TAYA Frank    GLUCOSE, POC    Collection Time: 09/27/22  6:56 AM   Result Value Ref Range    Glucose (POC) 135 (H) 65 - 117 mg/dL    Performed by TAYA Frank      The above assessment and plan reviewed with Dr. Renetta Michel. Jennifer Nuñez MD  [address]  Phone - [phone]   Fax - [phone]  www. [facility]

## 2022-09-27 NOTE — PROGRESS NOTES
[facility]                       Hospitalist Progress Note          Parris Figueredo MD  Please call  and page for questions. Call physician on-call through the  7pm-7am        Daily Progress Note: 9/27/2022    Primary care provider:Sebastien Jackson MD    Date of admission: 9/16/2022  9:32 AM    Admission summery and hospital course:  58 y.o. male who presents with fall and ankle fracture. Patient has past medical history of end-stage renal disease on hemodialysis. He said he struck his head he complains of left ankle injury. There was an injury over the medial malleolus . Right now patient complains of severe ankle pain was sedated for left ankle stabilization. Denies any chest pain, shortness of breath, fevers or recent illnesses. No headache. He does receive heparin for dialysis. He was due for dialysis today and typically receives it Monday Wednesday and Friday. Last dialysis was Wednesday. He sees Dr. Baltazar Bennett. Nephrology consulted for resuming hemodialysis. Subjective:     No active issues awaiting SNF  Dialysis as per nephrology Monday Wednesday Friday at [facility]  PT OT update note is required by [employer] discussed with CM  Blood pressure also better today    Assessment/Plan:     Left ankle dislocation, left distal fibula fracture s/p mechanical fall  S/p Closed reduction in ED  ORIF on 9/20/20  Strict ice/elevation left leg for edema control. Weight bearing restriction NWB left leg x 3 months. Pain control . Mild leukocytosis likely reactive 2/2 this- resolved. Repeat labs      ESRD on HD MWF anemia of CKD   MWF at [facility], Nephrology following.      Volume overload:  Improving attempting to pull 4-4.5 kg each HD     Panic attacks, anxiety 9/18-- now resolved   Likely 2/2 the abrupt discontinuation of chronic gabapentin which can cause irritability, tachycardia, diaphoresis (ie anxiety), therefore resumed gabapentin at 300 bid for now     Mechanical fall, \"legs buckled\"  Denies any pre-syncopal symptoms  CT head no acute abnormality  Carotid Doppler w/o b/l ICA stenosis, VA s patent,   Incidental large left thyroid nodule - f/up PCP for thyroid US FNAC      Hypertension  BP on the lower side of normal, better today     DM2, a1c 12.8, uncontrolled  SSI, Lantus, Humalog- cont to adjust PRN     Hypothyroidism  TSH low at 0.25, continue to hold Levothyroxine     Hyponatremia  Likely SIADH from ankle fracture, stress, pain, anxiety, resolving. Monitor. May correct with hemodialysis sodium 129 repeat labs     Constipation  -Start with daily polyethylene glycol and monitor with other bowel regimen including Dulcolax. DVT prophylaxis subcu brain  PT OT Case management consult awaiting SNF placement  Medically ready for discharge  Full code       Review of Systems:     Review of Systems:  Symptom  Y/N  Comments   Symptom  Y/N  Comments    Fever/Chills  n    Chest Pain  n    Poor Appetite   n   Edema   n    Cough  n   Abdominal Pain  n     Sputum  n   Joint Pain      SOB/DE SANTIAGO  n   Pruritis/Rash      Nausea/vomit  n   Tolerating PT/OT      Diarrhea  n   Tolerating Diet      Constipation  y   Other      Could not obtain due to:         Objective:   Physical Exam:     Visit Vitals  /70 (BP 1 Location: Right upper arm, BP Patient Position: Sitting)   Pulse 76   Temp 97.8 °F (36.6 °C)   Resp 16   Ht 6' (1.829 m)   Wt 135.3 kg (298 lb 4.5 oz)   SpO2 95%   BMI 40.45 kg/m²    O2 Flow Rate (L/min): 0 l/min O2 Device: None (Room air)    Temp (24hrs), Av.9 °F (36.6 °C), Min:97.5 °F (36.4 °C), Max:98.3 °F (36.8 °C)    701 - 1900  In: 500 [P.O.:500]  Out: -    1901 -  07  In: -   Out: 2647 [Urine:1550]    General:  Alert, cooperative, no distress, appears stated age. Patient is comfortable at bed. He is not at any distress.   I did not observe him any cough while I was at the room. Lungs:   Clear to auscultation bilaterally. Chest wall:  No tenderness or deformity. Heart:  Regular rate and rhythm, S1, S2 normal, no murmur. Abdomen:   Obese, soft, non-tender. Bowel sounds normal.    Extremities: Left ankle is covered with dry and clean dressing and bandage. Extremities normal, atraumatic, no cyanosis or edema. Pulses: 2+ and symmetric all extremities. Skin: Skin color, texture, turgor normal. No rashes or lesions   Neurologic: Patient has clear voice. He follows command. Data Review:       Recent Days:  Recent Labs     09/26/22  0139   WBC 6.5   HGB 9.7*   HCT 28.1*          Recent Labs     09/26/22  0140   *   K 4.5   CL 93*   CO2 26   *   BUN 80*   CREA 8.07*   CA 10.1   PHOS 7.6*   ALB 2.3*   ALT 6*       No results for input(s): PH, PCO2, PO2, HCO3, FIO2 in the last 72 hours.     24 Hour Results:  Recent Results (from the past 24 hour(s))   GLUCOSE, POC    Collection Time: 09/26/22  4:33 PM   Result Value Ref Range    Glucose (POC) 196 (H) 65 - 117 mg/dL    Performed by Erma Essex   PCT    GLUCOSE, POC    Collection Time: 09/26/22 10:08 PM   Result Value Ref Range    Glucose (POC) 150 (H) 65 - 117 mg/dL    Performed by BAINOANH Frank    GLUCOSE, POC    Collection Time: 09/27/22  6:56 AM   Result Value Ref Range    Glucose (POC) 135 (H) 65 - 117 mg/dL    Performed by BAIN [place] Powerjuli    GLUCOSE, POC    Collection Time: 09/27/22 11:07 AM   Result Value Ref Range    Glucose (POC) 124 (H) 65 - 117 mg/dL    Performed by Jack MATOS        Problem List:  Problem List as of 9/27/2022 Never Reviewed            Codes Class Noted - Resolved    Bimalleolar fracture of left ankle ICD-10-CM: R40.279W  ICD-9-CM: 824.4  9/17/2022 - Present        Syncope ICD-10-CM: R55  ICD-9-CM: 780.2  9/16/2022 - Present       Medications reviewed  Current Facility-Administered Medications   Medication Dose Route Frequency    tamsulosin (FLOMAX) capsule 0.8 mg 0.8 mg Oral DAILY    polyethylene glycol (MIRALAX) packet 17 g  17 g Oral DAILY    bisacodyL (DULCOLAX) suppository 10 mg  10 mg Rectal DAILY PRN    losartan (COZAAR) tablet 50 mg  50 mg Oral DAILY    rosuvastatin (CRESTOR) tablet 10 mg  10 mg Oral QHS    furosemide (LASIX) tablet 80 mg  80 mg Oral BID    hydrOXYzine HCL (ATARAX) tablet  mg   mg Oral Q8H PRN    sodium chloride (NS) flush 5-40 mL  5-40 mL IntraVENous Q8H    sodium chloride (NS) flush 5-40 mL  5-40 mL IntraVENous PRN    oxyCODONE IR (ROXICODONE) tablet 5 mg  5 mg Oral Q4H PRN    naloxone (NARCAN) injection 0.4 mg  0.4 mg IntraVENous PRN    [Held by provider] heparin (porcine) injection 5,000 Units  5,000 Units SubCUTAneous Q8H    morphine injection 4 mg  4 mg IntraVENous Q3H PRN    gabapentin (NEURONTIN) capsule 300 mg  300 mg Oral DIALYSIS MON, WED & FRI    insulin glargine (LANTUS) injection 40 Units  40 Units SubCUTAneous QHS    senna-docusate (PERICOLACE) 8.6-50 mg per tablet 1 Tablet  1 Tablet Oral DAILY    acetaminophen (TYLENOL) tablet 1,000 mg  1,000 mg Oral BID    insulin lispro (HUMALOG) injection 10 Units  10 Units SubCUTAneous TIDAC    insulin lispro (HUMALOG) injection   SubCUTAneous AC&HS    glucose chewable tablet 16 g  4 Tablet Oral PRN    glucagon (GLUCAGEN) injection 1 mg  1 mg IntraMUSCular PRN    dextrose 10 % infusion 0-250 mL  0-250 mL IntraVENous PRN    hydrALAZINE (APRESOLINE) 20 mg/mL injection 20 mg  20 mg IntraVENous Q4H PRN    albumin human 25% (BUMINATE) solution 25 g  25 g IntraVENous DIALYSIS PRN    sodium chloride (NS) flush 5-40 mL  5-40 mL IntraVENous Q8H    sodium chloride (NS) flush 5-40 mL  5-40 mL IntraVENous PRN    ondansetron (ZOFRAN ODT) tablet 4 mg  4 mg Oral Q8H PRN    Or    ondansetron (ZOFRAN) injection 4 mg  4 mg IntraVENous Q6H PRN    acetaminophen (TYLENOL) tablet 650 mg  650 mg Oral Q4H PRN    Or    acetaminophen (TYLENOL) suppository 650 mg  650 mg Rectal Q6H PRN    heparin (porcine) 1,000 unit/mL injection 2,200 Units  2,200 Units InterCATHeter DIALYSIS PRN    And    heparin (porcine) 1,000 unit/mL injection 2,100 Units  2,100 Units InterCATHeter DIALYSIS PRN       Care Plan discussed with: Patient/Family and Nurse    Total time spent with patient: 30 minutes.     Maria Guadalupe Landaverde MD

## 2022-09-28 ENCOUNTER — ANESTHESIA (OUTPATIENT)
Dept: SURGERY | Age: 62
DRG: 492 | End: 2022-09-28
Payer: MEDICARE

## 2022-09-28 LAB
CALCULATED R AXIS, ECG10: -67 DEGREES
CALCULATED T AXIS, ECG11: 93 DEGREES
DIAGNOSIS, 93000: NORMAL
GLUCOSE BLD STRIP.AUTO-MCNC: 106 MG/DL (ref 65–117)
GLUCOSE BLD STRIP.AUTO-MCNC: 109 MG/DL (ref 65–117)
GLUCOSE BLD STRIP.AUTO-MCNC: 113 MG/DL (ref 65–117)
GLUCOSE BLD STRIP.AUTO-MCNC: 97 MG/DL (ref 65–117)
Q-T INTERVAL, ECG07: 358 MS
QRS DURATION, ECG06: 172 MS
QTC CALCULATION (BEZET), ECG08: 477 MS
SERVICE CMNT-IMP: NORMAL
VENTRICULAR RATE, ECG03: 107 BPM

## 2022-09-28 PROCEDURE — 93005 ELECTROCARDIOGRAM TRACING: CPT

## 2022-09-28 PROCEDURE — 76010000149 HC OR TIME 1 TO 1.5 HR

## 2022-09-28 PROCEDURE — 74011250636 HC RX REV CODE- 250/636: Performed by: ORTHOPAEDIC SURGERY

## 2022-09-28 PROCEDURE — 74011250636 HC RX REV CODE- 250/636: Performed by: ANESTHESIOLOGY

## 2022-09-28 PROCEDURE — 74011636637 HC RX REV CODE- 636/637

## 2022-09-28 PROCEDURE — 74011250636 HC RX REV CODE- 250/636: Performed by: NURSE ANESTHETIST, CERTIFIED REGISTERED

## 2022-09-28 PROCEDURE — 77030042556 HC PNCL CAUT -B

## 2022-09-28 PROCEDURE — 74011000250 HC RX REV CODE- 250

## 2022-09-28 PROCEDURE — 77030040361 HC SLV COMPR DVT MDII -B

## 2022-09-28 PROCEDURE — 74011250636 HC RX REV CODE- 250/636

## 2022-09-28 PROCEDURE — 74011250637 HC RX REV CODE- 250/637: Performed by: ORTHOPAEDIC SURGERY

## 2022-09-28 PROCEDURE — 76060000033 HC ANESTHESIA 1 TO 1.5 HR

## 2022-09-28 PROCEDURE — 74011250637 HC RX REV CODE- 250/637: Performed by: FAMILY MEDICINE

## 2022-09-28 PROCEDURE — 05SF0ZZ REPOSITION LEFT CEPHALIC VEIN, OPEN APPROACH: ICD-10-PCS

## 2022-09-28 PROCEDURE — 77030002986 HC SUT PROL J&J -A

## 2022-09-28 PROCEDURE — 74011000250 HC RX REV CODE- 250: Performed by: NURSE ANESTHETIST, CERTIFIED REGISTERED

## 2022-09-28 PROCEDURE — P9047 ALBUMIN (HUMAN), 25%, 50ML: HCPCS | Performed by: ORTHOPAEDIC SURGERY

## 2022-09-28 PROCEDURE — 90935 HEMODIALYSIS ONE EVALUATION: CPT

## 2022-09-28 PROCEDURE — 2709999900 HC NON-CHARGEABLE SUPPLY

## 2022-09-28 PROCEDURE — 74011250637 HC RX REV CODE- 250/637

## 2022-09-28 PROCEDURE — 77030008462 HC STPLR SKN PROX J&J -A

## 2022-09-28 PROCEDURE — 77030039825 HC MSK NSL PAP SUPERNO2VA VYRM -B: Performed by: ANESTHESIOLOGY

## 2022-09-28 PROCEDURE — 82962 GLUCOSE BLOOD TEST: CPT

## 2022-09-28 PROCEDURE — 77030031139 HC SUT VCRL2 J&J -A

## 2022-09-28 PROCEDURE — 74011250637 HC RX REV CODE- 250/637: Performed by: INTERNAL MEDICINE

## 2022-09-28 PROCEDURE — 74011000250 HC RX REV CODE- 250: Performed by: ORTHOPAEDIC SURGERY

## 2022-09-28 PROCEDURE — 65270000046 HC RM TELEMETRY

## 2022-09-28 PROCEDURE — 76210000017 HC OR PH I REC 1.5 TO 2 HR

## 2022-09-28 RX ORDER — MORPHINE SULFATE 2 MG/ML
2 INJECTION, SOLUTION INTRAMUSCULAR; INTRAVENOUS
Status: DISCONTINUED | OUTPATIENT
Start: 2022-09-28 | End: 2022-09-28 | Stop reason: HOSPADM

## 2022-09-28 RX ORDER — SODIUM CHLORIDE, SODIUM LACTATE, POTASSIUM CHLORIDE, CALCIUM CHLORIDE 600; 310; 30; 20 MG/100ML; MG/100ML; MG/100ML; MG/100ML
75 INJECTION, SOLUTION INTRAVENOUS CONTINUOUS
Status: DISCONTINUED | OUTPATIENT
Start: 2022-09-28 | End: 2022-09-28 | Stop reason: HOSPADM

## 2022-09-28 RX ORDER — PROPOFOL 10 MG/ML
INJECTION, EMULSION INTRAVENOUS AS NEEDED
Status: DISCONTINUED | OUTPATIENT
Start: 2022-09-28 | End: 2022-09-28 | Stop reason: HOSPADM

## 2022-09-28 RX ORDER — MIDAZOLAM HYDROCHLORIDE 1 MG/ML
1 INJECTION, SOLUTION INTRAMUSCULAR; INTRAVENOUS AS NEEDED
Status: DISCONTINUED | OUTPATIENT
Start: 2022-09-28 | End: 2022-09-28 | Stop reason: HOSPADM

## 2022-09-28 RX ORDER — SODIUM CHLORIDE 9 MG/ML
50 INJECTION, SOLUTION INTRAVENOUS CONTINUOUS
Status: DISCONTINUED | OUTPATIENT
Start: 2022-09-28 | End: 2022-09-28 | Stop reason: HOSPADM

## 2022-09-28 RX ORDER — SODIUM CHLORIDE 0.9 % (FLUSH) 0.9 %
5-40 SYRINGE (ML) INJECTION EVERY 8 HOURS
Status: DISCONTINUED | OUTPATIENT
Start: 2022-09-28 | End: 2022-09-28 | Stop reason: HOSPADM

## 2022-09-28 RX ORDER — DIPHENHYDRAMINE HYDROCHLORIDE 50 MG/ML
12.5 INJECTION, SOLUTION INTRAMUSCULAR; INTRAVENOUS AS NEEDED
Status: DISCONTINUED | OUTPATIENT
Start: 2022-09-28 | End: 2022-09-28 | Stop reason: HOSPADM

## 2022-09-28 RX ORDER — HYDROMORPHONE HYDROCHLORIDE 1 MG/ML
0.2 INJECTION, SOLUTION INTRAMUSCULAR; INTRAVENOUS; SUBCUTANEOUS
Status: DISCONTINUED | OUTPATIENT
Start: 2022-09-28 | End: 2022-09-28 | Stop reason: HOSPADM

## 2022-09-28 RX ORDER — ONDANSETRON 2 MG/ML
4 INJECTION INTRAMUSCULAR; INTRAVENOUS AS NEEDED
Status: DISCONTINUED | OUTPATIENT
Start: 2022-09-28 | End: 2022-09-28 | Stop reason: HOSPADM

## 2022-09-28 RX ORDER — HEPARIN SODIUM 5000 [USP'U]/ML
5000 INJECTION, SOLUTION INTRAVENOUS; SUBCUTANEOUS EVERY 8 HOURS
Status: DISCONTINUED | OUTPATIENT
Start: 2022-09-29 | End: 2022-10-03 | Stop reason: HOSPADM

## 2022-09-28 RX ORDER — ONDANSETRON 2 MG/ML
INJECTION INTRAMUSCULAR; INTRAVENOUS AS NEEDED
Status: DISCONTINUED | OUTPATIENT
Start: 2022-09-28 | End: 2022-09-28 | Stop reason: HOSPADM

## 2022-09-28 RX ORDER — DEXMEDETOMIDINE HYDROCHLORIDE 100 UG/ML
INJECTION, SOLUTION INTRAVENOUS AS NEEDED
Status: DISCONTINUED | OUTPATIENT
Start: 2022-09-28 | End: 2022-09-28 | Stop reason: HOSPADM

## 2022-09-28 RX ORDER — FENTANYL CITRATE 50 UG/ML
50 INJECTION, SOLUTION INTRAMUSCULAR; INTRAVENOUS AS NEEDED
Status: DISCONTINUED | OUTPATIENT
Start: 2022-09-28 | End: 2022-09-28 | Stop reason: HOSPADM

## 2022-09-28 RX ORDER — LIDOCAINE HYDROCHLORIDE 20 MG/ML
INJECTION, SOLUTION EPIDURAL; INFILTRATION; INTRACAUDAL; PERINEURAL AS NEEDED
Status: DISCONTINUED | OUTPATIENT
Start: 2022-09-28 | End: 2022-09-28 | Stop reason: HOSPADM

## 2022-09-28 RX ORDER — FENTANYL CITRATE 50 UG/ML
25 INJECTION, SOLUTION INTRAMUSCULAR; INTRAVENOUS
Status: DISCONTINUED | OUTPATIENT
Start: 2022-09-28 | End: 2022-09-28 | Stop reason: HOSPADM

## 2022-09-28 RX ORDER — SODIUM CHLORIDE 9 MG/ML
INJECTION, SOLUTION INTRAVENOUS
Status: DISCONTINUED | OUTPATIENT
Start: 2022-09-28 | End: 2022-09-28 | Stop reason: HOSPADM

## 2022-09-28 RX ORDER — SODIUM CHLORIDE 0.9 % (FLUSH) 0.9 %
5-40 SYRINGE (ML) INJECTION AS NEEDED
Status: DISCONTINUED | OUTPATIENT
Start: 2022-09-28 | End: 2022-09-28 | Stop reason: HOSPADM

## 2022-09-28 RX ORDER — MIDAZOLAM HYDROCHLORIDE 1 MG/ML
0.5 INJECTION, SOLUTION INTRAMUSCULAR; INTRAVENOUS
Status: DISCONTINUED | OUTPATIENT
Start: 2022-09-28 | End: 2022-09-28 | Stop reason: HOSPADM

## 2022-09-28 RX ORDER — OXYCODONE AND ACETAMINOPHEN 5; 325 MG/1; MG/1
1 TABLET ORAL AS NEEDED
Status: DISCONTINUED | OUTPATIENT
Start: 2022-09-28 | End: 2022-09-28 | Stop reason: HOSPADM

## 2022-09-28 RX ORDER — LIDOCAINE HYDROCHLORIDE 10 MG/ML
0.1 INJECTION, SOLUTION EPIDURAL; INFILTRATION; INTRACAUDAL; PERINEURAL AS NEEDED
Status: DISCONTINUED | OUTPATIENT
Start: 2022-09-28 | End: 2022-09-28 | Stop reason: HOSPADM

## 2022-09-28 RX ORDER — ROPIVACAINE HYDROCHLORIDE 5 MG/ML
INJECTION, SOLUTION EPIDURAL; INFILTRATION; PERINEURAL
Status: COMPLETED | OUTPATIENT
Start: 2022-09-28 | End: 2022-09-28

## 2022-09-28 RX ADMIN — ROPIVACAINE HYDROCHLORIDE 10 ML: 5 INJECTION, SOLUTION EPIDURAL; INFILTRATION; PERINEURAL at 15:06

## 2022-09-28 RX ADMIN — DEXMEDETOMIDINE HYDROCHLORIDE 10 MCG: 100 INJECTION, SOLUTION, CONCENTRATE INTRAVENOUS at 15:28

## 2022-09-28 RX ADMIN — TAMSULOSIN HYDROCHLORIDE 0.8 MG: 0.4 CAPSULE ORAL at 09:08

## 2022-09-28 RX ADMIN — HEPARIN SODIUM 2200 UNITS: 1000 INJECTION INTRAVENOUS; SUBCUTANEOUS at 13:21

## 2022-09-28 RX ADMIN — INSULIN GLARGINE 40 UNITS: 100 INJECTION, SOLUTION SUBCUTANEOUS at 22:34

## 2022-09-28 RX ADMIN — ACETAMINOPHEN 1000 MG: 500 TABLET, FILM COATED ORAL at 09:08

## 2022-09-28 RX ADMIN — ONDANSETRON 4 MG: 2 INJECTION INTRAMUSCULAR; INTRAVENOUS at 10:59

## 2022-09-28 RX ADMIN — MIDAZOLAM HYDROCHLORIDE 0.5 MG: 1 INJECTION, SOLUTION INTRAMUSCULAR; INTRAVENOUS at 16:45

## 2022-09-28 RX ADMIN — SODIUM CHLORIDE, PRESERVATIVE FREE 10 ML: 5 INJECTION INTRAVENOUS at 07:03

## 2022-09-28 RX ADMIN — FUROSEMIDE 80 MG: 40 TABLET ORAL at 09:08

## 2022-09-28 RX ADMIN — ALBUMIN (HUMAN) 50 G: 0.25 INJECTION, SOLUTION INTRAVENOUS at 11:55

## 2022-09-28 RX ADMIN — HEPARIN SODIUM 2100 UNITS: 1000 INJECTION INTRAVENOUS; SUBCUTANEOUS at 13:20

## 2022-09-28 RX ADMIN — LOSARTAN POTASSIUM 50 MG: 50 TABLET, FILM COATED ORAL at 09:08

## 2022-09-28 RX ADMIN — PROPOFOL 50 MG: 10 INJECTION, EMULSION INTRAVENOUS at 15:26

## 2022-09-28 RX ADMIN — ROSUVASTATIN CALCIUM 10 MG: 10 TABLET, FILM COATED ORAL at 22:34

## 2022-09-28 RX ADMIN — DEXMEDETOMIDINE HYDROCHLORIDE 10 MCG: 100 INJECTION, SOLUTION, CONCENTRATE INTRAVENOUS at 15:30

## 2022-09-28 RX ADMIN — OXYCODONE 5 MG: 5 TABLET ORAL at 22:34

## 2022-09-28 RX ADMIN — MEPIVACAINE HYDROCHLORIDE 20 ML: 15 INJECTION, SOLUTION EPIDURAL; INFILTRATION at 15:06

## 2022-09-28 RX ADMIN — FENTANYL CITRATE 50 MCG: 50 INJECTION, SOLUTION INTRAMUSCULAR; INTRAVENOUS at 15:02

## 2022-09-28 RX ADMIN — SODIUM CHLORIDE: 900 INJECTION, SOLUTION INTRAVENOUS at 15:04

## 2022-09-28 RX ADMIN — LIDOCAINE HYDROCHLORIDE 50 MG: 20 INJECTION, SOLUTION EPIDURAL; INFILTRATION; INTRACAUDAL; PERINEURAL at 15:26

## 2022-09-28 RX ADMIN — MIDAZOLAM HYDROCHLORIDE 3 MG: 1 INJECTION, SOLUTION INTRAMUSCULAR; INTRAVENOUS at 15:02

## 2022-09-28 RX ADMIN — Medication 3 G: at 15:32

## 2022-09-28 RX ADMIN — PROPOFOL 25 MG: 10 INJECTION, EMULSION INTRAVENOUS at 15:25

## 2022-09-28 RX ADMIN — DOCUSATE SODIUM 50MG AND SENNOSIDES 8.6MG 1 TABLET: 8.6; 5 TABLET, FILM COATED ORAL at 09:08

## 2022-09-28 RX ADMIN — OXYCODONE 5 MG: 5 TABLET ORAL at 12:29

## 2022-09-28 RX ADMIN — SODIUM CHLORIDE, PRESERVATIVE FREE 10 ML: 5 INJECTION INTRAVENOUS at 22:00

## 2022-09-28 RX ADMIN — DEXMEDETOMIDINE HYDROCHLORIDE 10 MCG: 100 INJECTION, SOLUTION, CONCENTRATE INTRAVENOUS at 15:26

## 2022-09-28 RX ADMIN — FENTANYL CITRATE 25 MCG: 50 INJECTION, SOLUTION INTRAMUSCULAR; INTRAVENOUS at 17:09

## 2022-09-28 RX ADMIN — ONDANSETRON HYDROCHLORIDE 4 MG: 2 INJECTION, SOLUTION INTRAMUSCULAR; INTRAVENOUS at 15:47

## 2022-09-28 RX ADMIN — PROPOFOL 50 MCG/KG/MIN: 10 INJECTION, EMULSION INTRAVENOUS at 15:26

## 2022-09-28 RX ADMIN — PROPOFOL 25 MG: 10 INJECTION, EMULSION INTRAVENOUS at 15:24

## 2022-09-28 NOTE — ANESTHESIA PROCEDURE NOTES
Peripheral Block    Performed by: Alyson Rivera DO  Authorized by: Alyson Rivera DO       Pre-procedure:    Indications: at surgeon's request and post-op pain management    Preanesthetic Checklist: patient identified, risks and benefits discussed, site marked, timeout performed, anesthesia consent given and patient being monitored      Block Type:   Block Type:  Supraclavicular  Laterality:  Left  Monitoring:  Standard ASA monitoring, continuous pulse ox, frequent vital sign checks, heart rate, responsive to questions and oxygen  Injection Technique:  Single shot  Procedures: ultrasound guided    Patient Position: supine  Prep: chlorhexidine    Location:  Supraclavicular  Needle Type:  Stimuplex  Needle Gauge:  22 G  Needle Localization:  Nerve stimulator and ultrasound guidance  Medication Injected:  Mepivacaine PF (CARBOCAINE) 1.5 % injection - Peripheral Nerve Block   20 mL - 9/28/2022 3:06:00 PM  ropivacaine (PF) (NAROPIN)(0.5%) 5 mg/mL injection - Peripheral Nerve Block   10 mL - 9/28/2022 3:06:00 PM  Med Admin Time: 9/28/2022 3:06 PM    Assessment:  Number of attempts:  1  Injection Assessment:  Incremental injection every 5 mL, local visualized surrounding nerve on ultrasound, negative aspiration for blood, no intravascular symptoms, negative aspiration for CSF and no paresthesia  Patient tolerance:  Patient tolerated the procedure well with no immediate complications

## 2022-09-28 NOTE — ANESTHESIA PREPROCEDURE EVALUATION
Relevant Problems   No relevant active problems       Anesthetic History   No history of anesthetic complications            Review of Systems / Medical History  Patient summary reviewed, nursing notes reviewed and pertinent labs reviewed    Pulmonary          Undiagnosed apnea         Neuro/Psych             Comments: Sciatica (M54.30)    Dorsalgia (M54.9)    Polyneuropathy (G62.9) Cardiovascular    Hypertension      CHF        Exercise tolerance: <4 METS  Comments: Sinus rhythm with 1st degree AV block with occasional and consecutive   premature ventricular complexes   Right bundle branch block   Left anterior fascicular block    GI/Hepatic/Renal         Renal disease: ESRD and dialysis       Endo/Other    Diabetes    Morbid obesity and anemia     Other Findings   Comments: Bimalleolar fracture of left ankle repaired.   Denies other injuries           Physical Exam    Airway  Mallampati: III  TM Distance: > 6 cm  Neck ROM: short neck   Mouth opening: Diminished (comment)     Cardiovascular  Regular rate and rhythm,  S1 and S2 normal,  no murmur, click, rub, or gallop  Rhythm: regular  Rate: normal         Dental    Dentition: Poor dentition  Comments: broken and decayed    Pulmonary  Breath sounds clear to auscultation               Abdominal  GI exam deferred       Other Findings            Anesthetic Plan    ASA: 3  Anesthesia type: regional and general - backup - interscalene block          Induction: Intravenous  Anesthetic plan and risks discussed with: Patient

## 2022-09-28 NOTE — PROGRESS NOTES
Nephrology Progress Note  Ani Smith  Date of Admission : 9/16/2022    CC:  Follow up for ESRD       Assessment and Plan     ESRD on HD:  - MWF at [facility]  - Access: (R) permacath  - HD today  - appreciate vascular help with  AVF revision     Urinary retention   - continue flomax     HTN:  - cont current meds    Anemia of CKD:  - 9/20 Hgb stable without ADONIS can resume outpatient has his HD clinic    Secondary HPT    L distal fibula fracture:  - s/p ORIF 9/20    DM2  Obesity  Noncompliance       Interval History:  Seen and examined. For AVF revision and HD today   No new sx     Current Medications: all current  Medications have been eviewed in EPIC  Review of Systems: Pertinent items are noted in HPI. Objective:  Vitals:    Vitals:    09/27/22 1927 09/28/22 0245 09/28/22 0515 09/28/22 0849   BP: 120/69 (!) 149/68  138/72   Pulse: 82 77  78   Resp: 16 16 17   Temp: 98.1 °F (36.7 °C) 98.2 °F (36.8 °C)  98.2 °F (36.8 °C)   SpO2: 95% 95%  95%   Weight:   137.2 kg (302 lb 7.5 oz)    Height:   6' (1.829 m)      Intake and Output:  No intake/output data recorded. 09/26 1901 - 09/28 0700  In: 700 [P.O.:500;  I.V.:200]  Out: 1200 [Urine:1200]    Physical Examination:    General: NAD,Conversant   Neck:  Supple, no mass  Resp:  Lungs CTA B/L, no wheezing , normal respiratory effort  CV:  RRR,  no murmur or rub, 2+ LE edema  GI:  Soft, NT, + Bowel sounds, no hepatosplenomegaly  Neurologic:  Non focal  Psych:             AAO x 3 appropriate affect  Skin:  No Rash- left foot splint dressing D/I  Access:           RIJ PC c/d/i      Recent Labs     09/26/22  0140   *   K 4.5   CL 93*   CO2 26   *   BUN 80*   CREA 8.07*   CA 10.1   PHOS 7.6*   ALB 2.3*   ALT 6*       Recent Labs     09/26/22  0139   WBC 6.5   HGB 9.7*   HCT 28.1*          No results found for: SDES  No results found for: CULT  Recent Results (from the past 24 hour(s))   GLUCOSE, POC    Collection Time: 09/27/22 11:07 AM   Result Value Ref Range    Glucose (POC) 124 (H) 65 - 117 mg/dL    Performed by Kylah Lewis    GLUCOSE, POC    Collection Time: 09/27/22  4:44 PM   Result Value Ref Range    Glucose (POC) 120 (H) 65 - 117 mg/dL    Performed by [address] [place] [address] [place], POC    Collection Time: 09/27/22  9:21 PM   Result Value Ref Range    Glucose (POC) 139 (H) 65 - 117 mg/dL    Performed by Valery Hollis, POC    Collection Time: 09/28/22  6:40 AM   Result Value Ref Range    Glucose (POC) 113 65 - 117 mg/dL    Performed by Carlos Heredia      The above assessment and plan reviewed with Dr. Anuja Clemons. Compa Ochoa MD  [address]  Phone - [phone]   Fax - [phone]  [email or web address]

## 2022-09-28 NOTE — PROGRESS NOTES
Chart reviewed and spoke with nursing. Pt currently in dialysis and going for a procedure after. Will follow-up with OT tomorrow.

## 2022-09-28 NOTE — PROGRESS NOTES
2000 Received patient from [facility].    1624 Bedside shift change report given to Cody Delatorre RN (oncoming nurse) by Matias Alarcon RN. Report included the following information SBAR, Kardex, MAR, Recent Results. Problem: Falls - Risk of  Goal: *Absence of Falls  Description: Document Vernia Colder Fall Risk and appropriate interventions in the flowsheet. Outcome: Progressing Towards Goal  Note: Fall Risk Interventions:  Mobility Interventions: Patient to call before getting OOB    Mentation Interventions: Door open when patient unattended    Medication Interventions: Patient to call before getting OOB    Elimination Interventions: Call light in reach, Patient to call for help with toileting needs    History of Falls Interventions: Door open when patient unattended         Problem: Pain  Goal: *Control of Pain  Outcome: Progressing Towards Goal  Goal: *PALLIATIVE CARE:  Alleviation of Pain  Outcome: Progressing Towards Goal     Problem: Pressure Injury - Risk of  Goal: *Prevention of pressure injury  Description: Document See Scale and appropriate interventions in the flowsheet. Outcome: Progressing Towards Goal  Note: Pressure Injury Interventions:       Moisture Interventions: Contain wound drainage, Check for incontinence Q2 hours and as needed    Activity Interventions: PT/OT evaluation    Mobility Interventions: HOB 30 degrees or less    Nutrition Interventions: Document food/fluid/supplement intake    Friction and Shear Interventions: HOB 30 degrees or less                Problem: Diabetes Self-Management  Goal: *Disease process and treatment process  Description: Define diabetes and identify own type of diabetes; list 3 options for treating diabetes. Outcome: Progressing Towards Goal  Goal: *Incorporating nutritional management into lifestyle  Description: Describe effect of type, amount and timing of food on blood glucose; list 3 methods for planning meals.   Outcome: Progressing Towards Goal  Goal: *Incorporating physical activity into lifestyle  Description: State effect of exercise on blood glucose levels. Outcome: Progressing Towards Goal  Goal: *Developing strategies to promote health/change behavior  Description: Define the ABC's of diabetes; identify appropriate screenings, schedule and personal plan for screenings. Outcome: Progressing Towards Goal  Goal: *Using medications safely  Description: State effect of diabetes medications on diabetes; name diabetes medication taking, action and side effects. Outcome: Progressing Towards Goal  Goal: *Monitoring blood glucose, interpreting and using results  Description: Identify recommended blood glucose targets  and personal targets. Outcome: Progressing Towards Goal  Goal: *Prevention, detection, treatment of acute complications  Description: List symptoms of hyper- and hypoglycemia; describe how to treat low blood sugar and actions for lowering  high blood glucose level. Outcome: Progressing Towards Goal  Goal: *Prevention, detection and treatment of chronic complications  Description: Define the natural course of diabetes and describe the relationship of blood glucose levels to long term complications of diabetes.   Outcome: Progressing Towards Goal  Goal: *Developing strategies to address psychosocial issues  Description: Describe feelings about living with diabetes; identify support needed and support network  Outcome: Progressing Towards Goal  Goal: *Insulin pump training  Outcome: Progressing Towards Goal  Goal: *Sick day guidelines  Outcome: Progressing Towards Goal  Goal: *Patient Specific Goal (EDIT GOAL, INSERT TEXT)  Outcome: Progressing Towards Goal

## 2022-09-28 NOTE — PROGRESS NOTES
Problem: Falls - Risk of  Goal: *Absence of Falls  Description: Document Gen Martini Fall Risk and appropriate interventions in the flowsheet.   Outcome: Progressing Towards Goal  Note: Fall Risk Interventions:  Mobility Interventions: PT Consult for assist device competence, PT Consult for mobility concerns    Mentation Interventions: Reorient patient, More frequent rounding, Door open when patient unattended    Medication Interventions: Teach patient to arise slowly, Patient to call before getting OOB, Evaluate medications/consider consulting pharmacy    Elimination Interventions: Patient to call for help with toileting needs, Call light in reach, Stay With Me (per policy)    History of Falls Interventions: Room close to nurse's station, Utilize gait belt for transfer/ambulation, Evaluate medications/consider consulting pharmacy, Door open when patient unattended

## 2022-09-28 NOTE — PROGRESS NOTES
09/28/22 1601   Bowel Bundle Checklist   Wound Protector Used? N/A   Closing instruments isolated and used for closing fascia and skin? N/A   Sterile glove change by all team members prior to close? N/A   Gown changed by all team members prior to close?  N/A

## 2022-09-28 NOTE — PROGRESS NOTES
Pt in HD this am, will continue to follow and see as able. Pt typically fairly fatigued post HD.     Myra Rahman, DPT, PT

## 2022-09-28 NOTE — BRIEF OP NOTE
Brief Postoperative Note    Patient: Rosy Marin  YOB: 1960  MRN: [id number]    Date of Procedure: 9/28/2022     Pre-Op Diagnosis: END STAGE RENAL DISEASE    Post-Op Diagnosis: Same as preoperative diagnosis.       Procedure(s):  LEFT UPPER ARM CEPHALIC VEIN TRANSPOSITION FISTULA    Surgeon(s):  Berhane Herrera MD    Surgical Assistant: Surg Asst-1: Jennie EAST    Anesthesia: Regional     Estimated Blood Loss (mL): less than 50     Complications: None    Specimens: * No specimens in log *     Implants: * No implants in log *    Drains: * No LDAs found *    Findings: none    Electronically Signed by Adeel Han MD on 9/28/2022 at 4:29 PM

## 2022-09-28 NOTE — ROUTINE PROCESS
TRANSFER - OUT REPORT:    Verbal report given to [facility] RN(name) on [facility]  being transferred to (unit) for routine post - op       Report consisted of patients Situation, Background, Assessment and   Recommendations(SBAR). Time Pre op antibiotic given:1532  Anesthesia Stop time: 2498    Information from the following report(s) SBAR, Kardex, Procedure Summary, Intake/Output, MAR, and Recent Results was reviewed with the receiving nurse. Opportunity for questions and clarification was provided. Is the patient on 02? YES       L/Min 2       Other none    Is the patient on a monitor? NO    Is the nurse transporting with the patient? YES    Surgical Waiting Area notified of patient's transfer from PACU?  NO      The following personal items collected during your admission accompanied patient upon transfer:   Dental Appliance: Dental Appliances: None  Vision: Visual Aid: Glasses  Hearing Aid: Hearing Aid: None  Jewelry:    Clothing:    Other Valuables:    Valuables sent to safe:

## 2022-09-28 NOTE — PROGRESS NOTES
[facility] [facility] Adult  Hospitalist Group                       Hospitalist Progress Note          Cornelio Mercado MD  Please call  and page for questions. Call physician on-call through the  7pm-7am        Daily Progress Note: 9/28/2022    Primary care provider:Good Jackson MD    Date of admission: 9/16/2022  9:32 AM    Admission summery and hospital course:  58 y.o. male who presents with fall and ankle fracture. Patient has past medical history of end-stage renal disease on hemodialysis. He said he struck his head he complains of left ankle injury. There was an injury over the medial malleolus . Right now patient complains of severe ankle pain was sedated for left ankle stabilization. Denies any chest pain, shortness of breath, fevers or recent illnesses. No headache. He does receive heparin for dialysis. He was due for dialysis today and typically receives it Monday Wednesday and Friday. Last dialysis was Wednesday. He sees Dr. Toño Conley. Nephrology consulted for resuming hemodialysis. Subjective:     No active issue medically cleared for discharge patient is followed by care more  working for getting authorization    Assessment/Plan:     Left ankle dislocation, left distal fibula fracture s/p mechanical fall  S/p Closed reduction in ED  ORIF on 9/20/20  Strict ice/elevation left leg for edema control. Weight bearing restriction NWB left leg x 3 months. Pain control . Mild leukocytosis likely reactive 2/2 this- resolved. Repeat labs      ESRD on HD MWF anemia of CKD   MWF at [facility], Nephrology following.      Volume overload:  Improving attempting to pull 4-4.5 kg each HD     Panic attacks, anxiety 9/18-- now resolved   Likely 2/2 the abrupt discontinuation of chronic gabapentin which can cause irritability, tachycardia, diaphoresis (ie anxiety), therefore resumed gabapentin at 300 bid for now     Mechanical fall, \"legs buckled\"  Denies any pre-syncopal symptoms  CT head no acute abnormality  Carotid Doppler w/o b/l ICA stenosis, VA s patent,   Incidental large left thyroid nodule - f/up PCP for thyroid US FNAC      Hypertension  BP on the lower side of normal, better today     DM2, a1c 12.8, uncontrolled  SSI, Lantus, Humalog- cont to adjust PRN     Hypothyroidism  TSH low at 0.25, continue to hold Levothyroxine     Hyponatremia  Likely SIADH from ankle fracture, stress, pain, anxiety, resolving. Monitor. May correct with hemodialysis sodium 129 repeat labs     Constipation  -Start with daily polyethylene glycol and monitor with other bowel regimen including Dulcolax. DVT prophylaxis subcu brain  PT OT Case management consult awaiting SNF placement  Medically ready for discharge  Full code       Review of Systems:     Review of Systems:  Symptom  Y/N  Comments   Symptom  Y/N  Comments    Fever/Chills  n    Chest Pain  n    Poor Appetite   n   Edema   n    Cough  n   Abdominal Pain  n     Sputum  n   Joint Pain      SOB/DE SANTIAGO  n   Pruritis/Rash      Nausea/vomit  n   Tolerating PT/OT      Diarrhea  n   Tolerating Diet      Constipation  y   Other      Could not obtain due to:         Objective:   Physical Exam:     Visit Vitals  /67   Pulse 99   Temp 98.2 °F (36.8 °C)   Resp 14   Ht 6' (1.829 m)   Wt 137.2 kg (302 lb 7.5 oz)   SpO2 95%   BMI 41.02 kg/m²    O2 Flow Rate (L/min): 0 l/min O2 Device: None (Room air)    Temp (24hrs), Av.2 °F (36.8 °C), Min:98.1 °F (36.7 °C), Max:98.2 °F (36.8 °C)    No intake/output data recorded. 1901 -  0700  In: 700 [P.O.:500; I.V.:200]  Out: 1200 [Urine:1200]    General:  Alert, cooperative, no distress, appears stated age. Patient is comfortable at bed. He is not at any distress. I did not observe him any cough while I was at the room. Lungs:   Clear to auscultation bilaterally. Chest wall:  No tenderness or deformity. Heart:  Regular rate and rhythm, S1, S2 normal, no murmur. Abdomen:   Obese, soft, non-tender. Bowel sounds normal.    Extremities: Left ankle is covered with dry and clean dressing and bandage. Extremities normal, atraumatic, no cyanosis or edema. Pulses: 2+ and symmetric all extremities. Skin: Skin color, texture, turgor normal. No rashes or lesions   Neurologic: Patient has clear voice. He follows command. Data Review:       Recent Days:  Recent Labs     09/26/22  0139   WBC 6.5   HGB 9.7*   HCT 28.1*          Recent Labs     09/26/22  0140   *   K 4.5   CL 93*   CO2 26   *   BUN 80*   CREA 8.07*   CA 10.1   PHOS 7.6*   ALB 2.3*   ALT 6*       No results for input(s): PH, PCO2, PO2, HCO3, FIO2 in the last 72 hours.     24 Hour Results:  Recent Results (from the past 24 hour(s))   GLUCOSE, POC    Collection Time: 09/27/22  4:44 PM   Result Value Ref Range    Glucose (POC) 120 (H) 65 - 117 mg/dL    Performed by [employer], POC    Collection Time: 09/27/22  9:21 PM   Result Value Ref Range    Glucose (POC) 139 (H) 65 - 117 mg/dL    Performed by Nannette Jorge    GLUCOSE, POC    Collection Time: 09/28/22  6:40 AM   Result Value Ref Range    Glucose (POC) 113 65 - 117 mg/dL    Performed by Ananda Carroll        Problem List:  Problem List as of 9/28/2022 Never Reviewed            Codes Class Noted - Resolved    Bimalleolar fracture of left ankle ICD-10-CM: S82.842A  ICD-9-CM: 824.4  9/17/2022 - Present        Syncope ICD-10-CM: R55  ICD-9-CM: 780.2  9/16/2022 - Present       Medications reviewed  Current Facility-Administered Medications   Medication Dose Route Frequency    tamsulosin (FLOMAX) capsule 0.8 mg  0.8 mg Oral DAILY    polyethylene glycol (MIRALAX) packet 17 g  17 g Oral DAILY    bisacodyL (DULCOLAX) suppository 10 mg  10 mg Rectal DAILY PRN    losartan (COZAAR) tablet 50 mg  50 mg Oral DAILY    rosuvastatin (CRESTOR) tablet 10 mg  10 mg Oral QHS    furosemide (LASIX) tablet 80 mg  80 mg Oral BID    hydrOXYzine HCL (ATARAX) tablet  mg   mg Oral Q8H PRN    sodium chloride (NS) flush 5-40 mL  5-40 mL IntraVENous Q8H    sodium chloride (NS) flush 5-40 mL  5-40 mL IntraVENous PRN    oxyCODONE IR (ROXICODONE) tablet 5 mg  5 mg Oral Q4H PRN    naloxone (NARCAN) injection 0.4 mg  0.4 mg IntraVENous PRN    [Held by provider] heparin (porcine) injection 5,000 Units  5,000 Units SubCUTAneous Q8H    morphine injection 4 mg  4 mg IntraVENous Q3H PRN    gabapentin (NEURONTIN) capsule 300 mg  300 mg Oral DIALYSIS MON, WED & FRI    insulin glargine (LANTUS) injection 40 Units  40 Units SubCUTAneous QHS    senna-docusate (PERICOLACE) 8.6-50 mg per tablet 1 Tablet  1 Tablet Oral DAILY    acetaminophen (TYLENOL) tablet 1,000 mg  1,000 mg Oral BID    insulin lispro (HUMALOG) injection 10 Units  10 Units SubCUTAneous TIDAC    insulin lispro (HUMALOG) injection   SubCUTAneous AC&HS    glucose chewable tablet 16 g  4 Tablet Oral PRN    glucagon (GLUCAGEN) injection 1 mg  1 mg IntraMUSCular PRN    dextrose 10 % infusion 0-250 mL  0-250 mL IntraVENous PRN    hydrALAZINE (APRESOLINE) 20 mg/mL injection 20 mg  20 mg IntraVENous Q4H PRN    albumin human 25% (BUMINATE) solution 25 g  25 g IntraVENous DIALYSIS PRN    sodium chloride (NS) flush 5-40 mL  5-40 mL IntraVENous Q8H    sodium chloride (NS) flush 5-40 mL  5-40 mL IntraVENous PRN    ondansetron (ZOFRAN ODT) tablet 4 mg  4 mg Oral Q8H PRN    Or    ondansetron (ZOFRAN) injection 4 mg  4 mg IntraVENous Q6H PRN    acetaminophen (TYLENOL) tablet 650 mg  650 mg Oral Q4H PRN    Or    acetaminophen (TYLENOL) suppository 650 mg  650 mg Rectal Q6H PRN    heparin (porcine) 1,000 unit/mL injection 2,200 Units  2,200 Units InterCATHeter DIALYSIS PRN    And    heparin (porcine) 1,000 unit/mL injection 2,100 Units  2,100 Units InterCATHeter DIALYSIS PRN       Care Plan discussed with: Patient/Family and Nurse    Total time spent with patient: 30 minutes.     Gonzalez Braga MD

## 2022-09-28 NOTE — PROCEDURES
Hemodialysis / [phone]    Vitals Pre Post Assessment Pre Post   BP BP: (!) 144/62 (09/28/22 0952)   107/53 LOC axox4 No change   HR Pulse (Heart Rate): 78 (09/28/22 0952) 92 Lungs clear No change   Resp Resp Rate: 15 (09/28/22 0952) 16 Cardiac nsr No change   Temp Temp: 98.2 °F (36.8 °C) (09/28/22 0849) 98.5 Skin wdi No change   Weight Pre-Dialysis Weight: 135.3 kg (298 lb 4.5 oz) (09/23/22 0820)  Edema generalized No change   Tele status bedside bedside Pain Pain Intensity 1: 0 (09/28/22 0909) 0     Orders   Duration: Start: 1000 End: 1340 Total: 3.6   Dialyzer: Dialyzer/Set Up Inspection: Sophia Brumfield ([id number]) (09/28/22 0952)   K Bath: Dialysate K (mEq/L): 2 (09/28/22 0952)   Ca Bath: Dialysate CA (mEq/L): 2.5 (09/28/22 0952)   Na: Dialysate NA (mEq/L): 140 (09/28/22 0952)   Bicarb: Dialysate HCO3 (mEq/L): 35 (09/28/22 0952)   Target Fluid Removal: Goal/Amount of Fluid to Remove (mL): 4000 mL (09/28/22 0952)     [facility]   [facility] D[facility] CD[facility]. No s/s of infection. Both lumens aspirate & flush well. Running well at .    Comments:                                        Labs   HBsAg (Antigen) / date:                            254214 neg                   HBsAb (Antibody) / date: 605518 immune   Source:    Obtained/Reviewed  Critical Results Called HGB   Date Value Ref Range Status   09/26/2022 9.7 (L) 12.1 - 17.0 g/dL Final     Potassium   Date Value Ref Range Status   09/26/2022 4.5 3.5 - 5.1 mmol/L Final     Calcium   Date Value Ref Range Status   09/26/2022 10.1 8.5 - 10.1 MG/DL Final     BUN   Date Value Ref Range Status   09/26/2022 80 (H) 6 - 20 MG/DL Final     Creatinine   Date Value Ref Range Status   09/26/2022 8.07 (H) 0.70 - 1.30 MG/DL Final     Comment:     INVESTIGATED PER DELTA CHECK PROTOCOL        Meds Given   Name Dose Route   heparin 2.1 Art lie lock    2.2  Armando line lock   albumin 200 ml per md Cvc line     Adequacy / Fluid    Total Liters Process: 78   Net Fluid Removed: 155      Comments   Time Out Done:   (Time) 8402   Admitting Diagnosis:    Consent obtained/signed: Informed Consent Verified: Yes (09/28/22 3829)   Machine / RO # Machine Number: [id number] (09/28/22 5780)   Primary Nurse Rpt Pre: Mal Olson RN   Primary Nurse Rpt Post: Mal Olson RN   Pt Education:    Care Plan: FOLLOW MD [address]   Pts outpatient clinic:      Tx Summary    SBAR received from Primary RN. Pt arrived to HD [address]. Consent signed & on file. 0945: Each catheter limb disinfected per p&p, caps removed, hubs disinfected per p&p. Each lumen aspirated for blood return and flushed with Normal Saline per policy. 1000 VSS. Dialysis Tx initiated. 1015, pt blood pressure low, and complaint of n/v. Reached out to rn for RX.  1030 pt moaning, states he feels weird, lowered uf and tried to redirect pt.   1045 RN with RX,   1130 PT bp still low, and pt feels \" off\" he says,2 bottles of albumin already given. 1135 spoke to MD, No more uf, 2 more bottle of albumin given  1200 pt doing better, but still complaint of discomfort  1330 Rn asked when pt would be done, to go down to OR , Informed her he could be ready as soon as they come up, just let me know. 1340 transport shows up without calling or being sent by OR.     1345 Pt rinsed back, lines lock and ready to go. 1340: Tx ended. VSS. Each dialysis catheter limb disinfected per p&p, all possible blood returned per p&p, and each dialysis hub disinfected per p&p. Each lumen flushed, post dialysis catheter Heparin dwell instilled per order, and caps applied. Bed locked and in the lowest position, call bell and belongings in reach. SBAR given to Primary, RN. Patient is stable at time of their departure. All Dialysis related medications have been reviewed.          Comments:

## 2022-09-28 NOTE — ANESTHESIA POSTPROCEDURE EVALUATION
Procedure(s):  LEFT UPPER ARM CEPHALIC VEIN TRANSPOSITION FISTULA.    regional, general - backup, MAC    Anesthesia Post Evaluation      Multimodal analgesia: multimodal analgesia used between 6 hours prior to anesthesia start to PACU discharge  Patient location during evaluation: PACU  Patient participation: complete - patient participated  Level of consciousness: awake and alert  Pain management: adequate  Airway patency: patent  Anesthetic complications: no  Cardiovascular status: acceptable  Respiratory status: acceptable  Hydration status: acceptable  Comments: Pt seen, no complaints, afib in PACU, rate controlled and normotensive, cardiology consult and monitored bed requested. Discussed with RN and patient. Post anesthesia nausea and vomiting:  none  Final Post Anesthesia Temperature Assessment:  Normothermia (36.0-37.5 degrees C)      INITIAL Post-op Vital signs:   Vitals Value Taken Time   /63 09/28/22 1730   Temp 36.7 °C (98 °F) 09/28/22 1639   Pulse 101 09/28/22 1734   Resp 11 09/28/22 1734   SpO2 96 % 09/28/22 1734   Vitals shown include unvalidated device data.

## 2022-09-28 NOTE — PERIOP NOTES
1639: Pt arrived to PACU in afib, rate in low 100s. Pt asymptomatic. BP stable. Per CRNA pt in afib throughout OR case. 1700: Orders received for 12 lead EKG per Dr. Zonia Rosales. 1720: EKG confirms afib. Cardiology consult placed. 1730: S/w Dr. Star Perez regarding new afib, now rate controlled. BP stable. Dr. Star Perez to complete full consult in AM. Pt okay to go to remote tele bed.

## 2022-09-29 ENCOUNTER — APPOINTMENT (OUTPATIENT)
Dept: NON INVASIVE DIAGNOSTICS | Age: 62
DRG: 492 | End: 2022-09-29
Attending: NURSE PRACTITIONER
Payer: MEDICARE

## 2022-09-29 LAB
ANION GAP SERPL CALC-SCNC: 6 MMOL/L (ref 5–15)
BUN SERPL-MCNC: 33 MG/DL (ref 6–20)
BUN/CREAT SERPL: 6 (ref 12–20)
CALCIUM SERPL-MCNC: 10.4 MG/DL (ref 8.5–10.1)
CHLORIDE SERPL-SCNC: 98 MMOL/L (ref 97–108)
CO2 SERPL-SCNC: 30 MMOL/L (ref 21–32)
CREAT SERPL-MCNC: 5.23 MG/DL (ref 0.7–1.3)
ERYTHROCYTE [DISTWIDTH] IN BLOOD BY AUTOMATED COUNT: 12.2 % (ref 11.5–14.5)
GLUCOSE BLD STRIP.AUTO-MCNC: 122 MG/DL (ref 65–117)
GLUCOSE BLD STRIP.AUTO-MCNC: 190 MG/DL (ref 65–117)
GLUCOSE BLD STRIP.AUTO-MCNC: 77 MG/DL (ref 65–117)
GLUCOSE BLD STRIP.AUTO-MCNC: 99 MG/DL (ref 65–117)
GLUCOSE SERPL-MCNC: 94 MG/DL (ref 65–100)
HCT VFR BLD AUTO: 29 % (ref 36.6–50.3)
HGB BLD-MCNC: 9.5 G/DL (ref 12.1–17)
MAGNESIUM SERPL-MCNC: 2.1 MG/DL (ref 1.6–2.4)
MCH RBC QN AUTO: 30.7 PG (ref 26–34)
MCHC RBC AUTO-ENTMCNC: 32.8 G/DL (ref 30–36.5)
MCV RBC AUTO: 93.9 FL (ref 80–99)
NRBC # BLD: 0 K/UL (ref 0–0.01)
NRBC BLD-RTO: 0 PER 100 WBC
PLATELET # BLD AUTO: 225 K/UL (ref 150–400)
PMV BLD AUTO: 9.5 FL (ref 8.9–12.9)
POTASSIUM SERPL-SCNC: 3.8 MMOL/L (ref 3.5–5.1)
RBC # BLD AUTO: 3.09 M/UL (ref 4.1–5.7)
SERVICE CMNT-IMP: ABNORMAL
SERVICE CMNT-IMP: ABNORMAL
SERVICE CMNT-IMP: NORMAL
SERVICE CMNT-IMP: NORMAL
SODIUM SERPL-SCNC: 134 MMOL/L (ref 136–145)
T4 FREE SERPL-MCNC: 1 NG/DL (ref 0.8–1.5)
WBC # BLD AUTO: 7.3 K/UL (ref 4.1–11.1)

## 2022-09-29 PROCEDURE — 84439 ASSAY OF FREE THYROXINE: CPT

## 2022-09-29 PROCEDURE — 74011250637 HC RX REV CODE- 250/637

## 2022-09-29 PROCEDURE — 74011250637 HC RX REV CODE- 250/637: Performed by: NURSE PRACTITIONER

## 2022-09-29 PROCEDURE — 65270000046 HC RM TELEMETRY

## 2022-09-29 PROCEDURE — 83735 ASSAY OF MAGNESIUM: CPT

## 2022-09-29 PROCEDURE — 74011250636 HC RX REV CODE- 250/636

## 2022-09-29 PROCEDURE — 74011000250 HC RX REV CODE- 250

## 2022-09-29 PROCEDURE — 93306 TTE W/DOPPLER COMPLETE: CPT

## 2022-09-29 PROCEDURE — 97535 SELF CARE MNGMENT TRAINING: CPT

## 2022-09-29 PROCEDURE — 82962 GLUCOSE BLOOD TEST: CPT

## 2022-09-29 PROCEDURE — 97530 THERAPEUTIC ACTIVITIES: CPT

## 2022-09-29 PROCEDURE — 99223 1ST HOSP IP/OBS HIGH 75: CPT | Performed by: SPECIALIST

## 2022-09-29 PROCEDURE — 85027 COMPLETE CBC AUTOMATED: CPT

## 2022-09-29 PROCEDURE — 36415 COLL VENOUS BLD VENIPUNCTURE: CPT

## 2022-09-29 PROCEDURE — 80048 BASIC METABOLIC PNL TOTAL CA: CPT

## 2022-09-29 PROCEDURE — 74011636637 HC RX REV CODE- 636/637

## 2022-09-29 PROCEDURE — 74011250637 HC RX REV CODE- 250/637: Performed by: PHYSICIAN ASSISTANT

## 2022-09-29 RX ORDER — METOPROLOL TARTRATE 25 MG/1
25 TABLET, FILM COATED ORAL EVERY 12 HOURS
Status: DISCONTINUED | OUTPATIENT
Start: 2022-09-29 | End: 2022-10-03 | Stop reason: HOSPADM

## 2022-09-29 RX ORDER — HYDRALAZINE HYDROCHLORIDE 20 MG/ML
10 INJECTION INTRAMUSCULAR; INTRAVENOUS
Status: DISCONTINUED | OUTPATIENT
Start: 2022-09-29 | End: 2022-10-03 | Stop reason: HOSPADM

## 2022-09-29 RX ORDER — LORAZEPAM 0.5 MG/1
0.5 TABLET ORAL
Status: DISCONTINUED | OUTPATIENT
Start: 2022-09-29 | End: 2022-10-03 | Stop reason: HOSPADM

## 2022-09-29 RX ADMIN — DOCUSATE SODIUM 50MG AND SENNOSIDES 8.6MG 1 TABLET: 8.6; 5 TABLET, FILM COATED ORAL at 09:51

## 2022-09-29 RX ADMIN — ACETAMINOPHEN 1000 MG: 500 TABLET, FILM COATED ORAL at 09:51

## 2022-09-29 RX ADMIN — LACTULOSE 15 ML: 20 SOLUTION ORAL at 09:50

## 2022-09-29 RX ADMIN — Medication 2 UNITS: at 11:30

## 2022-09-29 RX ADMIN — HEPARIN SODIUM 5000 UNITS: 5000 INJECTION INTRAVENOUS; SUBCUTANEOUS at 07:29

## 2022-09-29 RX ADMIN — SODIUM CHLORIDE, PRESERVATIVE FREE 10 ML: 5 INJECTION INTRAVENOUS at 22:00

## 2022-09-29 RX ADMIN — OXYCODONE 5 MG: 5 TABLET ORAL at 22:41

## 2022-09-29 RX ADMIN — METOPROLOL TARTRATE 25 MG: 25 TABLET, FILM COATED ORAL at 22:27

## 2022-09-29 RX ADMIN — TAMSULOSIN HYDROCHLORIDE 0.8 MG: 0.4 CAPSULE ORAL at 09:51

## 2022-09-29 RX ADMIN — ROSUVASTATIN CALCIUM 10 MG: 10 TABLET, FILM COATED ORAL at 22:27

## 2022-09-29 RX ADMIN — LOSARTAN POTASSIUM 50 MG: 50 TABLET, FILM COATED ORAL at 09:51

## 2022-09-29 RX ADMIN — FUROSEMIDE 80 MG: 40 TABLET ORAL at 09:50

## 2022-09-29 RX ADMIN — FUROSEMIDE 80 MG: 40 TABLET ORAL at 17:28

## 2022-09-29 RX ADMIN — Medication 10 UNITS: at 12:32

## 2022-09-29 RX ADMIN — METOPROLOL TARTRATE 25 MG: 25 TABLET, FILM COATED ORAL at 11:44

## 2022-09-29 RX ADMIN — POLYETHYLENE GLYCOL 3350 17 G: 17 POWDER, FOR SOLUTION ORAL at 09:50

## 2022-09-29 RX ADMIN — ACETAMINOPHEN 1000 MG: 500 TABLET, FILM COATED ORAL at 17:29

## 2022-09-29 RX ADMIN — HEPARIN SODIUM 5000 UNITS: 5000 INJECTION INTRAVENOUS; SUBCUTANEOUS at 22:27

## 2022-09-29 RX ADMIN — LORAZEPAM 0.5 MG: 0.5 TABLET ORAL at 11:44

## 2022-09-29 RX ADMIN — HEPARIN SODIUM 5000 UNITS: 5000 INJECTION INTRAVENOUS; SUBCUTANEOUS at 14:54

## 2022-09-29 NOTE — PROGRESS NOTES
Called about pt with new afib, post fistula placement. HR/BP ok and for now, no rate control agent needed. Can give 5 mg IV metoprolol if HR sustained >110 bpm overnight.  Dr. Chace Lea to follow with full consult in AM.

## 2022-09-29 NOTE — PROGRESS NOTES
Problem: Falls - Risk of  Goal: *Absence of Falls  Description: Document Usha Tyson Fall Risk and appropriate interventions in the flowsheet.   Outcome: Progressing Towards Goal  Note: Fall Risk Interventions:  Mobility Interventions: PT Consult for assist device competence, PT Consult for mobility concerns, Patient to call before getting OOB, OT consult for ADLs, Bed/chair exit alarm    Mentation Interventions: Door open when patient unattended    Medication Interventions: Teach patient to arise slowly, Patient to call before getting OOB, Evaluate medications/consider consulting pharmacy    Elimination Interventions: Patient to call for help with toileting needs, Call light in reach, Stay With Me (per policy)    History of Falls Interventions: Door open when patient unattended, Room close to nurse's station

## 2022-09-29 NOTE — PROGRESS NOTES
Problem: Falls - Risk of  Goal: *Absence of Falls  Description: Document Kassie Dinh Fall Risk and appropriate interventions in the flowsheet.   Outcome: Progressing Towards Goal  Note: Fall Risk Interventions:  Mobility Interventions: PT Consult for assist device competence, PT Consult for mobility concerns    Mentation Interventions: Reorient patient, More frequent rounding, Door open when patient unattended    Medication Interventions: Teach patient to arise slowly, Patient to call before getting OOB, Evaluate medications/consider consulting pharmacy    Elimination Interventions: Patient to call for help with toileting needs, Call light in reach, Stay With Me (per policy)    History of Falls Interventions: Room close to nurse's station, Utilize gait belt for transfer/ambulation, Evaluate medications/consider consulting pharmacy, Door open when patient unattended         Problem: Patient Education: Go to Patient Education Activity  Goal: Patient/Family Education  Outcome: Progressing Towards Goal     Problem: Pain  Goal: *Control of Pain  Outcome: Progressing Towards Goal     Problem: Patient Education: Go to Patient Education Activity  Goal: Patient/Family Education  Outcome: Progressing Towards Goal     Problem: Pain  Goal: *Control of Pain  Outcome: Progressing Towards Goal

## 2022-09-29 NOTE — PROGRESS NOTES
Problem: Mobility Impaired (Adult and Pediatric)  Goal: *Acute Goals and Plan of Care (Insert Text)  Description: Description: FUNCTIONAL STATUS PRIOR TO ADMISSION: Patient was modified independent using a rollator for functional mobility. HOME SUPPORT: The patient lived with spouse and required some assistance with self-care tasks. Physical Therapy Goals  Reviewed/ revised 9/29/2022  1. Patient will move from supine to sit and sit to supine  in bed with minimal assistance/contact guard assist within 7 day(s). 2.  Patient will transfer from bed to chair and chair to bed NWB L LE with moderate assistance using lateral technique and  least restrictive device within 7 day(s). 3.  Patient will perform sit to stand with moderate assistance NWB L LE within 7 day(s). 4.  Patient will perform HEP for LE strength with supervision within 7 day(s). Physical Therapy Goals  Initiated 9/22/2022  1. Patient will move from supine to sit and sit to supine  in bed with supervision/set-up within 7 day(s). 2.  Patient will transfer from bed to chair and chair to bed with minimal assistance/contact guard assist using the least restrictive device within 7 day(s). 3.  Patient will perform sit to stand with moderate assistance  within 7 day(s). 4.  Patient will perform BLE therex (as appropriate) for strengthening with supervision within 7 day(s). Outcome: Progressing Towards Goal   PHYSICAL THERAPY TREATMENT: WEEKLY REASSESSMENT  Patient: Dejon Alcazar (89 y.o. male)  Date: 9/29/2022  Primary Diagnosis: Syncope [R55]  Bimalleolar fracture of left ankle [S82.842A]  Procedure(s) (LRB):  LEFT UPPER ARM CEPHALIC VEIN TRANSPOSITION FISTULA (Left) 1 Day Post-Op   Precautions:   NWB, Fall (LLE)      ASSESSMENT  Patient continues with skilled PT services and is slowly progressing towards goals.  Barriers to function with mobility include NWB status L LE, decreased activity tolerance, impaired standing balance, increased risk for fall. Focus of session on transfer training bed<->drop arm chair using lateral technique due to pt difficulty maintaining NWB L LE with standing. Pt required increased time and mod A x 2 for scooting, cues for sequence. Noted improved ability to scoot with return to bed from chair. Nursing students and PCT requested assist with pt transfer back to bed. Instructed in lateral technique and NWB L LE, observed pt transfer chair to bed with therapy. Pt will benefit from con't PT for mobility progression as tolerated. Recommend IPR at d/c to facilitate pt return to max level of functional indep. Patient's progression toward goals since last assessment: increasing function with bed mob and lateral transfer technique    Current Level of Function Impacting Discharge (mobility/balance): bed mob mod A of 1 and SBA of another, bed to chair mod A x 2 for lateral transfer      Other factors to consider for discharge: NWB L LE         PLAN :  Goals have been updated based on progression since last assessment. Patient continues to benefit from skilled intervention to address the above impairments. Recommendations and Planned Interventions: bed mobility training, gait training, therapeutic exercises, patient and family training/education, and therapeutic activities      Frequency/Duration: Patient will be followed by physical therapy:  5 times a week to address goals.     Recommendation for discharge: (in order for the patient to meet his/her long term goals)  Therapy 3 hours per day 5-7 days per week    This discharge recommendation:  Has been made in collaboration with the attending provider and/or case management    IF patient discharges home will need the following DME: to be determined (TBD)         SUBJECTIVE:   Patient reported abdominal cramping related to gas    OBJECTIVE DATA SUMMARY:   HISTORY:    Past Medical History:   Diagnosis Date    Anemia     Chronic kidney disease     Diabetes ([facility] [address] Dorsalgia     ESRD on hemodialysis ([facility] [address]     Gastro-esophageal reflux     Heart failure (HCC)     Hypertension     Obesity     Polyneuropathy     Sciatica    History reviewed. No pertinent surgical history. Personal factors and/or comorbidities impacting plan of care: NWB L LE    Home Situation  Home Environment: Independent living [facility])  # Steps to Enter: 0  One/Two Story Residence: One story  Living Alone: No  Support Systems: Spouse/Significant Other  Patient Expects to be Discharged to[de-identified] Skilled nursing facility  Current DME Used/Available at Home: attila Ceballos    EXAMINATION/PRESENTATION/DECISION MAKING:   Critical Behavior:  Neurologic State: Alert  Orientation Level: Oriented X4  Cognition: Follows commands  Safety/Judgement: Decreased insight into deficits  Hearing: Auditory  Auditory Impairment: None    Range Of Motion:  AROM: Generally decreased, functional (L foot/ ankle immobilized)                       Strength:    Strength: Generally decreased, functional                    Tone & Sensation:   Tone: Normal              Sensation: Intact               Coordination:  Coordination: Within functional limits  Vision:      Functional Mobility:  Bed Mobility:     Supine to Sit: Moderate assistance (+ SBA of another)  Sit to Supine: Moderate assistance (for LEs, + CGA of another at trunk)  Scooting: Assist x2; Additional time  Transfers:              Bed to Chair: Moderate assistance;Assist x2 (lateral transfer to/ from drop arm chair)              Balance:   Sitting: Intact  Standing: Impaired; With support  Standing - Static: Poor;Constant support  Ambulation/Gait Training:                       Left Side Weight Bearing: Non-weight bearing (L LE)            Pain Rating:  Pt reported pain L UE    Activity Tolerance:   Good    After treatment patient left in no apparent distress:   Supine in bed, Heels elevated for pressure relief, Call bell within reach, Bed / chair alarm activated, and Side rails x 3    COMMUNICATION/EDUCATION:   The patients plan of care was discussed with: Registered nurse. Fall prevention education was provided and the patient/caregiver indicated understanding., Patient/family have participated as able in goal setting and plan of care. , and Patient/family agree to work toward stated goals and plan of care.     Thank you for this referral.  Gillian Walden, PT   Time Calculation: 20 mins

## 2022-09-29 NOTE — CONSULTS
[facility]  Cardiology Care Note                  [x]Initial visit     []Established visit     Patient Name: Marium Munroe - K:[id number]  Primary Cardiologist: none  Consulting Cardiologist: Marcela Murry MD     Reason for initial visit: afib    HPI:   Mr. Rosana Arteaga is a 58 y.o. male with PMH of HTN, DM type II, ESRD on HD MWF, hypothyroidism, morbid obesity. He also reports prior history of \"enlarged heart\" and possible CHF. for which he saw a cardiologist couple years ago but the cardiologist left the practice and he never followed up. He presented 22 with chief c/o fall when his legs \"gave out\" and sustained left ankle fractureand subsequently underwent surgical repair. He underwent new LUE AV fistula placement yesterday as prior AV fistula was tortuous. During surgery, anesthesia noted that pt was in afib throughout surgery. Initial EKG on presentation showed NSR) and subsequent telemetry strips available through 22 showed NSR. His heart rate has been controlled without rate control medications. He denies any prior history of afib, palpitations. Denies any history of chest pain. Has occasional SOB which he has attributed to smoking >1PPD. He states he ambulates around house, walks dog with occasional SOB but not limiting. Has orthopnea but denies BLE edema except if he missed dialysis. Has been on HD for couple of years. States he had heart catheterization years ago but no MI or stents. Reports he has been told he has GONZALO but has never had a sleep study. He reports he has a lot of anxiety, wants to go home. He on the verge of tears frequently during conversation. SH: smokes at least 1 ppd, no ETOH use, no illicit drugs. Lives with his wife. FH:  Mom  of DM and CHF (age 80), Dad had alzheimers.       Assessment and Plan      Afib: new diagnosis  -Uncertain time of onset, however he was not in afib on arrival to ER 9/16/22. No afib noted on recorded tele strips through 9/19/22. Pre-procedure anesthesia assessment did indicate RRR. He wasOff tele 9/19/22. He has no history of palpitations so asymptomatic.   -Heart rate is overall controlled (<110- 80's. ) with no rate control medications.   -Start  metoprolol 25 mg BID  -TSH is 0.25, Free T4 NL  Check electrolytes. -Check echocardiogram- there is a question of possible history of CHF but unclear. -[id number]=oy least 3. Recommend start on IV heparin for stroke ppx. Contacted Dr. Yifan Horvath who recommends delay starting heparin until tomorrow due to LUE bleed risk post procedure. Will start therapeutic IV heparin tomorrow WITHOUT heparin bolus. 2.  History of HTN:  -BP currently controlled. -Continue losartan 50 mg daily (home dose 100 mg but BP soft at times)    3. Hypothyroidism, Thyroid nodule  On synthroid. TSH is 0.25, Free T4 is Nl  Thyroid nodule noted on carotid US- defer to primary team.    4.  Suspected GONZALO:    -Needs outpatient sleep study    5. RBBB, LAFB: unclear if new or old    10. DM type II: uncontrolled  -Hgb A1C=12.8    7. Anemia, chronic   Lab Results   Component Value Date/Time    HGB 9.5 (L) 09/29/2022 09:47 AM     8. Tobacco abuse: advise cessation. 9.   L distal fibula fracture: s/p ORIF 9/20/22    Further recommendations after echocardiogram. Unclear if he has history of CHF in past. aWill review echo. Assessment/Plan/Discussion:Cardiology Attending:     Patient seen on the day of progress note, examined and reviewed  with Nurse Practitioner.   Mari Quiñones is a 58 y.o. male with unclear past cardiac hx  Now with fall and fracture, not syncope, came in and after few weeks had surgery now on fistula, had intra op AFib, smoker with father with CAD, hx of ESRD, DM,HTN  O: VS reviewed   Patient Vitals for the past 4 hrs:   Temp Pulse Resp BP SpO2   09/29/22 2039 98 °F (36.7 °C) 85 20 134/63 93 %   09/29/22 1948 -- 90 -- -- --   09/29/22 1800 -- 82 -- -- --        NC AT no JVD, clear lungsIRIR smrg, leg in dressing    L:  Lab Results   Component Value Date/Time    Creatinine 5.23 (H) 09/29/2022 09:47 AM     No results found for: BNP, BNPP, BNPPPOC, XBNPT, BNPNT  Lab Results   Component Value Date/Time    HGB 9.5 (L) 09/29/2022 09:47 AM         A/P:  Afib, check echo, went over needs for rate/rhythm control and eventual  OAC  Will start heparin for now, surgery asked to wait until am which is fine since we are in the first 24 -48 hours    Luis Jorgensen MD   This note was created using voice recognition software. Despite editing, there may be syntax errors. ____________________________________________________________    Cardiac testing        Carotid doppler:   no carotid artery stenosis, Vertebral arteries patent. Left thyroid nodule        Most recent HS troponins:  No results for input(s): TROPHS in the last 72 hours. No lab exists for component:  CKMB  ECG: afib with RBBB, LAFB,  Review of Systems    [x]All other systems reviewed and all negative except as written in HPI    [] Patient unable to provide secondary to condition    Past Medical History:   Diagnosis Date    Anemia     Chronic kidney disease     Diabetes ([facility] [address]     Dorsalgia     ESRD on hemodialysis ([facility] [address]     Gastro-esophageal reflux     Heart failure ([facility] [address]     Hypertension     Obesity     Polyneuropathy     Sciatica      History reviewed. No pertinent surgical history. Social Hx:  reports that he has been smoking cigarettes. He has been smoking an average of .5 packs per day. He has never used smokeless tobacco.  Family Hx: family history is not on file.   Allergies   Allergen Reactions    Adhesive Rash     Paper tape ok    Bactrim [Sulfamethoprim] Rash          OBJECTIVE:  Wt Readings from Last 3 Encounters:   09/29/22 139.8 kg (308 lb 3.3 oz)       Intake/Output Summary (Last 24 hours) at 9/29/2022 [id number] filed at 9/29/2022 0701  Gross per 24 hour   Intake 250 ml   Output 1330 ml   Net -1080 ml         Physical Exam    Vitals:   Vitals:    09/29/22 0416 09/29/22 0537 09/29/22 0607 09/29/22 0810   BP:    133/78   Pulse: 99  (P) 89 88   Resp:    20   Temp:    97.6 °F (36.4 °C)   SpO2:    98%   Weight:  139.8 kg (308 lb 3.3 oz)     Height:  6' (1.829 m)       Telemetry: afib, HR controlled  General:    Alert, cooperative, no distress, appears stated age. Neck:   Supple,    Back:    Not examind    Lungs:     clear to auscultation bilaterally. Heart[de-identified]    Regular rate and rhythm, S1, S2 normal, no murmur, click, rub or gallop. Abdomen:     Soft, non-tender. Bowel sounds normal.    Extremities:   Extremities normal, atraumatic, no cyanosis or edema. Vascular:   Pulses - 2+ Rt DP. Left LE in splint, toes warm. Skin:   Skin color normal. No rashes or lesions on visible areas   Neurologic:   Alert, Moves all extremities. Data Review:     Radiology:   XR Results (most recent):  Results from Hospital Encounter encounter on 09/16/22    XR ANKLE LT MIN 3 V    Narrative  INTERPRETATION PROVIDED FOR COMPLIANCE ONLY AT NO CHARGE    FINDINGS: Intraoperative spot radiographs of the left ankle were obtained during  open reduction and internal fixation of a trimalleolar fracture. Alignment is  nearly anatomic. No operative complication is evident. Impression  Left ankle ORIF in progress. CT Results (most recent):  Results from Hospital Encounter encounter on 09/16/22    CT HEAD WO CONT    Narrative  EXAM: CT HEAD WO CONT    INDICATION: fall, head injury, anticoagulated    COMPARISON: None. CONTRAST: None. TECHNIQUE: Unenhanced CT of the head was performed using 5 mm images. Brain and  bone windows were generated. Coronal and sagittal reformats.  CT dose reduction  was achieved through use of a standardized protocol tailored for this  examination and automatic exposure control for dose modulation. FINDINGS:  The ventricles and sulci are normal in size, shape and configuration. . There is  no significant white matter disease. There is no intracranial hemorrhage,  extra-axial collection, or mass effect. The basilar cisterns are open. No CT  evidence of acute infarct. The bone windows demonstrate no abnormalities. The visualized portions of the  paranasal sinuses and mastoid air cells are clear. Vascular calcification is  noted. Impression  No acute abnormality. MRI Results (most recent):  No results found for this or any previous visit. No results for input(s): CPK, TROIQ in the last 72 hours. No lab exists for component: CKQMB, CPKMB, BMPP  No results for input(s): NA, K, CL, CO2, BUN, CREA, GLU, PHOS, CA in the last 72 hours. No results for input(s): WBC, HGB, HCT, PLT, HGBEXT, HCTEXT, PLTEXT in the last 72 hours. No results for input(s): PTP, INR, AP, INREXT in the last 72 hours. No lab exists for component: PTTP, GPT, SGOT  No results for input(s): CHOL, LDLC in the last 72 hours. No lab exists for component: TGL, HDLC,  HBA1C  No results for input(s): CRP, TSH, TSHEXT in the last 72 hours.     No lab exists for component: ESR        Current meds:    Current Facility-Administered Medications:     lactulose (CHRONULAC) 10 gram/15 mL solution 15 mL, 15 mL, Oral, ONCE, Lampugnale, Cy A, PA-C    LORazepam (ATIVAN) tablet 0.5 mg, 0.5 mg, Oral, BID PRN, Lampugnale, Cy A, PA-C    heparin (porcine) injection 5,000 Units, 5,000 Units, SubCUTAneous, Q8H, Tariq Lee MD, 5,000 Units at 09/29/22 0729    tamsulosin (FLOMAX) capsule 0.8 mg, 0.8 mg, Oral, DAILY, Shyrl Sven PUGA MD, 0.8 mg at 09/28/22 0908    polyethylene glycol (MIRALAX) packet 17 g, 17 g, Oral, DAILY, Tariq Lee MD, 17 g at 09/27/22 0919    bisacodyL (DULCOLAX) suppository 10 mg, 10 mg, Rectal, DAILY PRN, Tariq Lee MD    losartan (COZAAR) tablet 50 mg, 50 mg, Oral, DAILY, Tariq Lee MD, 50 mg at 09/28/22 0908    rosuvastatin (CRESTOR) tablet 10 mg, 10 mg, Oral, QHS, Rose Mary Lombardo MD, 10 mg at 09/28/22 2234    furosemide (LASIX) tablet 80 mg, 80 mg, Oral, BID, Rose Mary Lombardo MD, 80 mg at 09/28/22 0908    hydrOXYzine HCL (ATARAX) tablet  mg,  mg, Oral, Q8H PRN, Rose Mary Lombardo MD, 25 mg at 09/26/22 2257    sodium chloride (NS) flush 5-40 mL, 5-40 mL, IntraVENous, Q8H, Bernardo PUGA MD, 10 mL at 09/28/22 2200    sodium chloride (NS) flush 5-40 mL, 5-40 mL, IntraVENous, PRN, Rose Mary Lombardo MD    oxyCODONE IR (ROXICODONE) tablet 5 mg, 5 mg, Oral, Q4H PRN, Rose Mary Lombardo MD, 5 mg at 09/28/22 2234    naloxone [facility]) injection 0.4 mg, 0.4 mg, IntraVENous, PRN, Rose Mary Lombardo MD    morphine injection 4 mg, 4 mg, IntraVENous, Q3H PRN, Rose Mary Lombardo MD, 4 mg at 09/27/22 1209    [COMPLETED] gabapentin (NEURONTIN) capsule 300 mg, 300 mg, Oral, BID, 300 mg at 09/20/22 2242 **FOLLOWED BY** [COMPLETED] gabapentin (NEURONTIN) capsule 300 mg, 300 mg, Oral, ACD, 300 mg at 09/24/22 1711 **FOLLOWED BY** gabapentin (NEURONTIN) capsule 300 mg, 300 mg, Oral, DIALYSIS MON, WED & Thakur Beto, Rose Mary Lombardo MD, 300 mg at 09/26/22 1500    insulin glargine (LANTUS) injection 40 Units, 40 Units, SubCUTAneous, QHS, Rose Mary Lombardo MD, 40 Units at 09/28/22 2234    senna-docusate (PERICOLACE) 8.6-50 mg per tablet 1 Tablet, 1 Tablet, Oral, DAILY, Rose Mary Lombardo MD, 1 Tablet at 09/28/22 0908    acetaminophen (TYLENOL) tablet 1,000 mg, 1,000 mg, Oral, BID, Rose Mary Lombardo MD, 1,000 mg at 09/28/22 0908    insulin lispro (HUMALOG) injection 10 Units, 10 Units, SubCUTAneous, TIDAC, Rose Mary Lombardo MD, 10 Units at 09/27/22 1706    insulin lispro (HUMALOG) injection, , SubCUTAneous, AC&HS, Rose Mary Lombardo MD, 2 Units at 09/26/22 1726    glucose chewable tablet 16 g, 4 Tablet, Oral, PRN, Rose Mary Lombardo MD    glucagon (GLUCAGEN) injection 1 mg, 1 mg, IntraMUSCular, PRN, Rose Mary Lombardo MD dextrose 10 % infusion 0-250 mL, 0-250 mL, IntraVENous, PRN, Yasmine Nunez MD    hydrALAZINE (APRESOLINE) 20 mg/mL injection 20 mg, 20 mg, IntraVENous, Q4H PRN, Yasmine Nunez MD, 20 mg at 09/26/22 2220    albumin human 25% (BUMINATE) solution 25 g, 25 g, IntraVENous, DIALYSIS PRN, Yasmine Nunez MD, 50 g at 09/28/22 1155    sodium chloride (NS) flush 5-40 mL, 5-40 mL, IntraVENous, Q8H, Radha PUGA MD, 10 mL at 09/28/22 2200    sodium chloride (NS) flush 5-40 mL, 5-40 mL, IntraVENous, PRN, Yasmine Nunez MD    ondansetron (ZOFRAN ODT) tablet 4 mg, 4 mg, Oral, Q8H PRN **OR** ondansetron (ZOFRAN) injection 4 mg, 4 mg, IntraVENous, Q6H PRN, Yasmine Nunez MD, 4 mg at 09/28/22 1059    acetaminophen (TYLENOL) tablet 650 mg, 650 mg, Oral, Q4H PRN, 650 mg at 09/23/22 2125 **OR** acetaminophen (TYLENOL) suppository 650 mg, 650 mg, Rectal, Q6H PRN, Yasmine Nunez MD    heparin (porcine) 1,000 unit/mL injection 2,200 Units, 2,200 Units, InterCATHeter, DIALYSIS PRN, 2,200 Units at 09/28/22 1321 **AND** heparin (porcine) 1,000 unit/mL injection 2,100 Units, 2,100 Units, InterCATHeter, DIALYSIS PRN, Yasmine Nunez MD, 2,100 Units at 09/28/22 [address] Marek NP  [facility] [address] [facility] [address]  [facility]  [phone]      Patrick Moura MD

## 2022-09-29 NOTE — PROGRESS NOTES
Problem: Self Care Deficits Care Plan (Adult)  Goal: *Acute Goals and Plan of Care (Insert Text)  Description: FUNCTIONAL STATUS PRIOR TO ADMISSION: Patient was modified independent using a rollator for functional mobility. HOME SUPPORT: The patient lived with spouse and required some assistance with self-care tasks. Occupational Therapy Goals  Initiated 9/22/2022, Reviewed 9/29/22, goals remain appropriate  1. Patient will perform bathing with minimal assistance/contact guard assist within 7 day(s). 2.  Patient will perform lower body dressing with moderate assistance  within 7 day(s). 3.  Patient will perform toilet transfers with moderate assistance  within 7 day(s). 4.  Patient will perform all aspects of toileting with moderate assistance  within 7 day(s). 5.  Patient will participate in upper extremity therapeutic exercise/activities with supervision/set-up for 10 minutes within 7 day(s). 6.  Patient will utilize energy conservation techniques during functional activities with verbal cues within 7 day(s). Outcome: Progressing Towards Goal   OCCUPATIONAL THERAPY TREATMENT/Weekly Assessment  Patient: Ric Garcia (31 y.o. male)  Date: 9/29/2022  Diagnosis: Syncope [R55]  Bimalleolar fracture of left ankle [S82.842A] <principal problem not specified>  Procedure(s) (LRB):  LEFT UPPER ARM CEPHALIC VEIN TRANSPOSITION FISTULA (Left) 1 Day Post-Op  Precautions: NWB, Fall (LLE)  Chart, occupational therapy assessment, plan of care, and goals were reviewed. ASSESSMENT  Patient continues with skilled OT services and is progressing towards goals. Pt received supine in bed, sleeping, but easy to arouse. Pt willing to participate. Pt presents at mod assist x 1 with bed mobility and mod to max assist x 2 with lateral transfers. Pt s/p POD#1, L UE AV fistula placement and reporting increase pain.   Pt not able to use RW at this time d/t pain L arm and performed lateral transfer from bed to chair, 30 min later, assisted with chair to bed. Less assistance noted with chair to bed. Overall, pt making slow progress. Limited by pain, NWB L LE, endurance, strength, and morbid obesity. Continue to recommend further rehab at d/c d/t pt being far below his baseline. Current Level of Function Impacting Discharge (ADLs): upto total assist    Other factors to consider for discharge: NWB L LE, a-fib         PLAN :  Patient continues to benefit from skilled intervention to address the above impairments. Continue treatment per established plan of care to address goals. Recommend with staff:     Recommend next OT session: BSC transfers    Recommendation for discharge: (in order for the patient to meet his/her long term goals)  Therapy 3 hours per day 5-7 days per week    This discharge recommendation:  A follow-up discussion with the attending provider and/or case management is planned    IF patient discharges home will need the following DME: TBD       SUBJECTIVE:   Patient stated This arm really hurts.     OBJECTIVE DATA SUMMARY:   Cognitive/Behavioral Status:  Neurologic State: Alert  Orientation Level: Oriented X4  Cognition: Follows commands             Functional Mobility and Transfers for ADLs:  Bed Mobility:  Supine to Sit: Moderate assistance (+ SBA of another)  Sit to Supine: Moderate assistance (for LEs, + CGA of another at trunk)  Scooting: Assist x2; Additional time    Transfers:        Bed to Chair: Moderate assistance;Assist x2 (lateral transfer to/ from drop arm chair)    Balance:  Sitting: Intact  Standing: Impaired; With support  Standing - Static: Poor;Constant support    ADL Intervention:  Feeding  Feeding Assistance: Independent              Type of Bath: Chlorhexidine (CHG)         Upper Body [address]: Set-up    Lower Body Dressing Assistance  Socks: Total assistance (dependent)    Toileting  Toileting Assistance:  Total assistance(dependent)         Pain:  Increase pain L UE, no verbal score given    Activity Tolerance:   Fair    After treatment patient left in no apparent distress:   Sitting in chair, Supine in bed, and Call bell within reach    COMMUNICATION/COLLABORATION:   The patients plan of care was discussed with: Physical therapist and Registered nurse.      Enrique Cano OTR/L  Time Calculation: 25 mins

## 2022-09-29 NOTE — OP NOTES
[address]  OPERATIVE REPORT    Name:  Chapo Quevedo  MR#:  [id number]  :  1960  ACCOUNT #:  [de-identified]  DATE OF SERVICE:  2022    PREOPERATIVE DIAGNOSIS:  Renal failure. POSTOPERATIVE DIAGNOSIS:  Renal failure. PROCEDURE PERFORMED:  Left upper arm cephalic vein transposition fistula. SURGEON:  Griselda Arias MD    ASSISTANT:  Jason Brown. ANESTHESIA:  Regional block. COMPLICATIONS:  None. SPECIMENS REMOVED:  None. IMPLANTS:  None. ESTIMATED BLOOD LOSS:  25 mL. INDICATIONS:  The patient is a 58-year-old male with end-stage renal disease, on hemodialysis. He had a left brachial cephalic fistula placed one year ago. The fistula has developed well but is quite tortuous and the dialysis units are not able to cannulate it. It will be transposed and straightened. PROCEDURE:  The patient's left arm was prepped and draped. A transverse incision was made at the antecubital level. The fistula was dissected free circumferentially. Two additional longitudinal incisions were made in an interrupted fashion in the upper arm overlying the fistula. The fistula was further dissected free from the antecubital level to the proximal upper arm. It was then clamped and divided at the antecubital level and removed from its previous anatomic location. It was then tunneled just medial to the previously made incisions just beneath the skin surface. The fistula was straightened as best possible. A 2- to 3-cm segment was excised. The fistula was then reapproximated end-to-end using running 5-0 Prolene. At the completion, there was a good thrill in the fistula. The incisions were closed with Vicryl subcutaneous suture and skin staples. Dressings were applied, and the patient was returned to the recovery room in stable condition.     Reginaldo Del Valle MD      [id number]  D:  2022 16:35  T:  2022 20:11  JOB #:  [id number]

## 2022-09-29 NOTE — PROGRESS NOTES
Vascular:    Stable post left arm fistula revision. Dressing intact, thrill palpable.     Will arrange follow up in 2 weeks for staple removal.

## 2022-09-29 NOTE — PROGRESS NOTES
[address] [facility] Adult  Hospitalist Group                                                                                          Hospitalist Progress Note  Ric Franco PA-C  Answering service: [phone] from in house phone        Date of Service:  2022  NAME:  Gerardo Chandra  :  1960  MRN:  [id number]      Admission Summary:   Gerardo Chandra is a 58 y.o. male with PMHx of ESRD on HD MWF, HTN, uncontrolled T2DM, and hypothyroidism who presented to the ED s/p L ankle injury and underwent L ankle ORIF with I&D of L ankle on 22 with Dr. Jessica Meadows. In regards to his ESRD, his LUE fistula was placed ~ 1 year ago but not usable due to tortuosity and now s/p AVF transposition on 22 with Vascular Surgeon, Dr. Yifan Horvath. Post-operative course c/b A-Fib with controlled rates for which cardiology consulted. Interval history / Subjective:   Mr. Cyn Lombardo reports feeling unwell at the time of the visit. He is extremely anxious about the many things occurring and is very worried that \"I will die. \" Significant reassurance provided. He notes he is extremely anxious and agreeable to try low dose Ativan to try and help his nerves. He wishes his wife was here now, offered to call her during rounds but he does not know her number. We reviewed his hospitalization to date, the accomplishments he has had, and ongoing things team is working on. He notes no bowel movement for several days, + flatus. Reviewed escalation of bowel regimen. Assessment & Plan:     Left ankle dislocation, left distal fibula fracture s/p mechanical fall  S/p Closed reduction in ED  ORIF on 20 with Dr. Jessica Meadows  Mechanical fall, \"legs buckled\"  -- Appreciate Orthopedic Surgery involvement. Signed off on 22.   -- Per Ortho Sign off note:  - DVT ppx - Heparin daily in hospital; defer to medicine based on co-morbidities; needs coverage until follow up appointment; SCDs  - PT/OT - NWB LLE  - Pain - continue current Meds; Ice, Elevation  - Dressing - Leave in place if it remains dry and intact; change as needed for saturation with dry sterile island dressing  -- Fall: No syncopal symptoms. Work-up with CT Head 9/16 with no acute abnormality and carotid duplex with no stenosis. -- Pain: Tylenol 1000mg BID standing with additional 650mg q4h PRN. Remove IV Morphine. Continue Oxycodone 5mg q4h PRN for breakthrough pain     ESRD on HD MWF   Anemia of CKD  Tortuosity of LUE fistula, s/p AVF transposition on 9/28/22 with Dr. Moe Gabriel  -- Nephrology following  -- Anemia: Can resume ADONIS at outpatient HD clinic  -- [address] [facility] involvement. Signed off 9/29. Will need follow-up in 2 weeks for staple removal.   -- Continues Lasix 80mg BID    Atrial Fibrillation  -- Onset on 9/28 after OR with vascular surgery  -- Cardiology consulted  -- Rate Control: Start Metoprolol Tartrate 25mg BID  -- TTE ordered  -- CHADS2 VASC score is 3. Defer to cardiology regarding anticoagulation for stroke prevention. Their note discusses IV Heparin gtt to start 9/30     Panic attacks, anxiety   -- Felt due to abrupt discontinuation of chronic gabapentin and therefore was resumed at 300mg MWF dosing  -- Ongoing significant anxiety/fear around medical procedures and chronic conditions. Will benefit from long-term follow-up with counselor/psychiatry. Start Ativan 0.5mg BID PRN with hold parameters     Incidental large left thyroid nodule on Carotic Duplex 9/16  -- Needs to follow-up with PCP and completed dedicated imaging      Hypertension  -- Overall stable. Continues on home Losartan 50mg daily     DM2, a1c 12.8, uncontrolled  -- Continues on Lantus 40 units nightly, Lispro 10 units with meals, and ISS     Hypothyroidism  -- TSH 0.25 on 9/18/22. Team has been holding his Levothyroxine this admission. BPH  -- Continue home Flomax 0.8mg daily     Constipation  -- Daily Miralax and Pericolace. Add Lactulose PRN. Encouraged use. If fails will try suppository     Code status: Full Code  Prophylaxis: [place] [address] discussed with: Pt, Cardiology, RN  Anticipated Disposition: TBD. PT/OT recommends IPR     Hospital Problems  Date Reviewed: 9/28/2022            Codes Class Noted POA    Bimalleolar fracture of left ankle ICD-10-CM: S82.842A  ICD-9-CM: 824.4  9/17/2022 Unknown        Syncope ICD-10-CM: R55  ICD-9-CM: 780.2  9/16/2022 Unknown             Review of Systems:   A comprehensive review of systems was negative except for that written in the HPI. Vital Signs:    Last 24hrs VS reviewed since prior progress note. Most recent are:  Visit Vitals  /70 (BP 1 Location: Right upper arm, BP Patient Position: At rest;Lying)   Pulse 85   Temp 98.2 °F (36.8 °C)   Resp 20   Ht 6' (1.829 m)   Wt 139.7 kg (308 lb)   SpO2 94%   BMI 41.77 kg/m²         Intake/Output Summary (Last 24 hours) at 9/29/2022 1626  Last data filed at 9/29/2022 1510  Gross per 24 hour   Intake 150 ml   Output 550 ml   Net -400 ml        Physical Examination:     I had a face to face encounter with this patient and independently examined them on 9/29/2022 as outlined below:          Constitutional:  Anxious, tearful, but cooperative, pleasant    ENT:  Oral mucosa moist, oropharynx benign. Resp:  CTA bilaterally. No wheezing/rhonchi/rales. No accessory muscle use. 2L NC in place   CV:  Distant heart sounds. Pulse irregularly irregular. No appreciable murmurs    GI:  Soft, non distended, non tender. Musculoskeletal:  No edema, warm and well perfused. The LLE has a clean dressing/ACE wrap overlying    Neurologic:  Moves all extremities spontaneously.   AAOx3            Data Review:    Review and/or order of clinical lab test  Review and/or order of tests in the radiology section of CPT  Review and/or order of tests in the medicine section of CPT      Labs:     Recent Labs     09/29/22  0947   WBC 7.3   HGB 9.5*   HCT 29.0*        Recent Labs 09/29/22  0947   *   K 3.8   CL 98   CO2 30   BUN 33*   CREA 5.23*   GLU 94   CA 10.4*   MG 2.1       Lab Results   Component Value Date/Time    Glucose (POC) 190 (H) 09/29/2022 11:26 AM    Glucose (POC) 99 09/29/2022 06:21 AM    Glucose (POC) 109 09/28/2022 10:05 PM    Glucose (POC) 106 09/28/2022 06:18 PM    Glucose (POC) 97 09/28/2022 02:09 PM           Medications Reviewed:     Current Facility-Administered Medications   Medication Dose Route Frequency    LORazepam (ATIVAN) tablet 0.5 mg  0.5 mg Oral BID PRN    metoprolol tartrate (LOPRESSOR) tablet 25 mg  25 mg Oral Q12H    hydrALAZINE (APRESOLINE) 20 mg/mL injection 10 mg  10 mg IntraVENous Q4H PRN    heparin (porcine) injection 5,000 Units  5,000 Units SubCUTAneous Q8H    tamsulosin (FLOMAX) capsule 0.8 mg  0.8 mg Oral DAILY    polyethylene glycol (MIRALAX) packet 17 g  17 g Oral DAILY    bisacodyL (DULCOLAX) suppository 10 mg  10 mg Rectal DAILY PRN    losartan (COZAAR) tablet 50 mg  50 mg Oral DAILY    rosuvastatin (CRESTOR) tablet 10 mg  10 mg Oral QHS    furosemide (LASIX) tablet 80 mg  80 mg Oral BID    hydrOXYzine HCL (ATARAX) tablet  mg   mg Oral Q8H PRN    sodium chloride (NS) flush 5-40 mL  5-40 mL IntraVENous Q8H    sodium chloride (NS) flush 5-40 mL  5-40 mL IntraVENous PRN    oxyCODONE IR (ROXICODONE) tablet 5 mg  5 mg Oral Q4H PRN    naloxone (NARCAN) injection 0.4 mg  0.4 mg IntraVENous PRN    morphine injection 4 mg  4 mg IntraVENous Q3H PRN    gabapentin (NEURONTIN) capsule 300 mg  300 mg Oral DIALYSIS MON, WED & FRI    insulin glargine (LANTUS) injection 40 Units  40 Units SubCUTAneous QHS    senna-docusate (PERICOLACE) 8.6-50 mg per tablet 1 Tablet  1 Tablet Oral DAILY    acetaminophen (TYLENOL) tablet 1,000 mg  1,000 mg Oral BID    insulin lispro (HUMALOG) injection 10 Units  10 Units SubCUTAneous TIDAC    insulin lispro (HUMALOG) injection   SubCUTAneous AC&HS    glucose chewable tablet 16 g  4 Tablet Oral PRN glucagon (GLUCAGEN) injection 1 mg  1 mg IntraMUSCular PRN    dextrose 10 % infusion 0-250 mL  0-250 mL IntraVENous PRN    albumin human 25% (BUMINATE) solution 25 g  25 g IntraVENous DIALYSIS PRN    sodium chloride (NS) flush 5-40 mL  5-40 mL IntraVENous Q8H    sodium chloride (NS) flush 5-40 mL  5-40 mL IntraVENous PRN    ondansetron (ZOFRAN ODT) tablet 4 mg  4 mg Oral Q8H PRN    Or    ondansetron (ZOFRAN) injection 4 mg  4 mg IntraVENous Q6H PRN    acetaminophen (TYLENOL) tablet 650 mg  650 mg Oral Q4H PRN    Or    acetaminophen (TYLENOL) suppository 650 mg  650 mg Rectal Q6H PRN    heparin (porcine) 1,000 unit/mL injection 2,200 Units  2,200 Units InterCATHeter DIALYSIS PRN    And    heparin (porcine) 1,000 unit/mL injection 2,100 Units  2,100 Units InterCATH[facility] DIALYSIS PRN     ______________________________________________________________________  EXPECTED LENGTH OF STAY: 6d 4h  ACTUAL LENGTH OF STAY:          12                 Ric Franco PA-C

## 2022-09-29 NOTE — PROGRESS NOTES
Nephrology Progress Note  Rosalia Robert  Date of Admission : 9/16/2022    CC:  Follow up for ESRD       Assessment and Plan     ESRD on HD:  - MWF at [facility]  - Access: (R) permacath  - HD tomorrow per routine  - s/p AVF transposition 9/28    Urinary retention   - continue flomax     HTN:  - cont current meds    Anemia of CKD:  - 9/20 Hgb stable without ADONIS can resume outpatient has his HD clinic    Secondary HPT    L distal fibula fracture:  - s/p ORIF 9/20    DM2  Obesity  Noncompliance       Interval History:  Seen and examined. Resting in bed. No cp, sob, n/v/d. Hemo yesterday. About 1 L UF. Current Medications: all current  Medications have been eviewed in EPIC  Review of Systems: Pertinent items are noted in HPI. Objective:  Vitals:    Vitals:    09/29/22 0416 09/29/22 0537 09/29/22 0607 09/29/22 0810   BP:    133/78   Pulse: 99  (P) 89 88   Resp:    20   Temp:    97.6 °F (36.4 °C)   SpO2:    98%   Weight:  139.8 kg (308 lb 3.3 oz)     Height:  6' (1.829 m)       Intake and Output:  09/29 0701 - 09/29 1900  In: -   Out: 100 [Urine:100]  09/27 1901 - 09/29 0700  In: 450 [I.V.:450]  Out: 1880 [Urine:900]    Physical Examination:    General: NAD,Conversant   Neck:  Supple, no mass  Resp:  Lungs CTA B/L, no wheezing , normal respiratory effort  CV:  RRR,  no murmur or rub, 2+ LE edema  GI:  Soft, NT, + Bowel sounds, no hepatosplenomegaly  Neurologic:  Non focal  Psych:             AAO x 3 appropriate affect  Skin:  No Rash- left foot splint dressing D/I  Access:           [facility] PC c/d/i      No results for input(s): NA, K, CL, CO2, GLU, BUN, CREA, CA, MG, PHOS, ALB, TBIL, ALT, INR, INREXT, INREXT in the last 72 hours. No lab exists for component: SGOT    No results for input(s): WBC, HGB, HCT, PLT, HGBEXT, HCTEXT, PLTEXT, HGBEXT, HCTEXT, PLTEXT in the last 72 hours.     No results found for: SDES  No results found for: CULT  Recent Results (from the past 24 hour(s))   GLUCOSE, POC Collection Time: 09/28/22  2:09 PM   Result Value Ref Range    Glucose (POC) 97 65 - 117 mg/dL    Performed by [facility] April  RN    EKG, 12 LEAD, INITIAL    Collection Time: 09/28/22  5:21 PM   Result Value Ref Range    Ventricular Rate 107 BPM    QRS Duration 172 ms    Q-T Interval 358 ms    QTC Calculation (Bezet) 477 ms    Calculated R Axis -67 degrees    Calculated T Axis 93 degrees    Diagnosis       Atrial fibrillation with rapid ventricular response  Right bundle branch block  Left anterior fascicular block  ** Bifascicular block **  T wave abnormality, consider lateral ischemia  Abnormal ECG  When compared with ECG of 16-SEP-2022 09:53,  Atrial fibrillation has replaced Sinus rhythm  Confirmed by Willie Smith ([id number]) on 9/28/2022 11:06:19 PM     GLUCOSE, POC    Collection Time: 09/28/22  6:18 PM   Result Value Ref Range    Glucose (POC) 106 65 - 117 mg/dL    Performed by Mei Mcdermott, POC    Collection Time: 09/28/22 10:05 PM   Result Value Ref Range    Glucose (POC) 109 65 - 117 mg/dL    Performed by Joseline George    GLUCOSE, POC    Collection Time: 09/29/22  6:21 AM   Result Value Ref Range    Glucose (POC) 99 65 - 117 mg/dL    Performed by Miller Mckeon      The above assessment and plan reviewed with Dr. Pino Hancock. Syl Rodriguez MD  [address]  Phone - [phone]   Fax - [phone]  [email or web address]

## 2022-09-30 LAB
ANION GAP SERPL CALC-SCNC: 10 MMOL/L (ref 5–15)
BASOPHILS # BLD: 0 K/UL (ref 0–0.1)
BASOPHILS NFR BLD: 0 % (ref 0–1)
BUN SERPL-MCNC: 43 MG/DL (ref 6–20)
BUN/CREAT SERPL: 7 (ref 12–20)
CALCIUM SERPL-MCNC: 9.9 MG/DL (ref 8.5–10.1)
CHLORIDE SERPL-SCNC: 97 MMOL/L (ref 97–108)
CHOLEST SERPL-MCNC: 80 MG/DL
CO2 SERPL-SCNC: 25 MMOL/L (ref 21–32)
CREAT SERPL-MCNC: 6.17 MG/DL (ref 0.7–1.3)
DIFFERENTIAL METHOD BLD: ABNORMAL
ECHO AO ROOT DIAM: 4 CM
ECHO AO ROOT INDEX: 1.56 CM/M2
ECHO EST RA PRESSURE: 3 MMHG
ECHO LA DIAMETER INDEX: 2.19 CM/M2
ECHO LA DIAMETER: 5.6 CM
ECHO LA TO AORTIC ROOT RATIO: 1.4
ECHO LV E' LATERAL VELOCITY: 10 CM/S
ECHO LV E' SEPTAL VELOCITY: 6 CM/S
ECHO LV FRACTIONAL SHORTENING: 34 % (ref 28–44)
ECHO LV INTERNAL DIMENSION DIASTOLE INDEX: 2.5 CM/M2
ECHO LV INTERNAL DIMENSION DIASTOLIC: 6.4 CM (ref 4.2–5.9)
ECHO LV INTERNAL DIMENSION SYSTOLIC INDEX: 1.64 CM/M2
ECHO LV INTERNAL DIMENSION SYSTOLIC: 4.2 CM
ECHO LV IVSD: 1.2 CM (ref 0.6–1)
ECHO LV MASS 2D: 389 G (ref 88–224)
ECHO LV MASS INDEX 2D: 152 G/M2 (ref 49–115)
ECHO LV POSTERIOR WALL DIASTOLIC: 1.4 CM (ref 0.6–1)
ECHO LV RELATIVE WALL THICKNESS RATIO: 0.44
ECHO LVOT AREA: 3.8 CM2
ECHO LVOT DIAM: 2.2 CM
ECHO LVOT PEAK GRADIENT: 5 MMHG
ECHO LVOT PEAK VELOCITY: 1.1 M/S
ECHO MV E VELOCITY: 1.32 M/S
ECHO MV E/E' LATERAL: 13.2
ECHO MV E/E' RATIO (AVERAGED): 17.6
ECHO MV E/E' SEPTAL: 22
ECHO RV INTERNAL DIMENSION: 4.9 CM
ECHO RV TAPSE: 1.6 CM (ref 1.7–?)
EOSINOPHIL # BLD: 0.1 K/UL (ref 0–0.4)
EOSINOPHIL NFR BLD: 2 % (ref 0–7)
ERYTHROCYTE [DISTWIDTH] IN BLOOD BY AUTOMATED COUNT: 12.2 % (ref 11.5–14.5)
GLUCOSE BLD STRIP.AUTO-MCNC: 109 MG/DL (ref 65–117)
GLUCOSE BLD STRIP.AUTO-MCNC: 125 MG/DL (ref 65–117)
GLUCOSE BLD STRIP.AUTO-MCNC: 165 MG/DL (ref 65–117)
GLUCOSE BLD STRIP.AUTO-MCNC: 84 MG/DL (ref 65–117)
GLUCOSE SERPL-MCNC: 131 MG/DL (ref 65–100)
HCT VFR BLD AUTO: 28.1 % (ref 36.6–50.3)
HDLC SERPL-MCNC: 20 MG/DL
HDLC SERPL: 4 {RATIO} (ref 0–5)
HGB BLD-MCNC: 9.4 G/DL (ref 12.1–17)
IMM GRANULOCYTES # BLD AUTO: 0 K/UL (ref 0–0.04)
IMM GRANULOCYTES NFR BLD AUTO: 0 % (ref 0–0.5)
LDLC SERPL CALC-MCNC: 36.2 MG/DL (ref 0–100)
LYMPHOCYTES # BLD: 1.2 K/UL (ref 0.8–3.5)
LYMPHOCYTES NFR BLD: 16 % (ref 12–49)
MAGNESIUM SERPL-MCNC: 2.1 MG/DL (ref 1.6–2.4)
MCH RBC QN AUTO: 31.2 PG (ref 26–34)
MCHC RBC AUTO-ENTMCNC: 33.5 G/DL (ref 30–36.5)
MCV RBC AUTO: 93.4 FL (ref 80–99)
MONOCYTES # BLD: 0.6 K/UL (ref 0–1)
MONOCYTES NFR BLD: 8 % (ref 5–13)
NEUTS SEG # BLD: 5.2 K/UL (ref 1.8–8)
NEUTS SEG NFR BLD: 74 % (ref 32–75)
NRBC # BLD: 0 K/UL (ref 0–0.01)
NRBC BLD-RTO: 0 PER 100 WBC
PHOSPHATE SERPL-MCNC: 5.8 MG/DL (ref 2.6–4.7)
PLATELET # BLD AUTO: 211 K/UL (ref 150–400)
PMV BLD AUTO: 9.4 FL (ref 8.9–12.9)
POTASSIUM SERPL-SCNC: 4.1 MMOL/L (ref 3.5–5.1)
RBC # BLD AUTO: 3.01 M/UL (ref 4.1–5.7)
SERVICE CMNT-IMP: ABNORMAL
SERVICE CMNT-IMP: ABNORMAL
SERVICE CMNT-IMP: NORMAL
SERVICE CMNT-IMP: NORMAL
SODIUM SERPL-SCNC: 132 MMOL/L (ref 136–145)
TRIGL SERPL-MCNC: 119 MG/DL (ref ?–150)
VLDLC SERPL CALC-MCNC: 23.8 MG/DL
WBC # BLD AUTO: 7.1 K/UL (ref 4.1–11.1)

## 2022-09-30 PROCEDURE — 74011250637 HC RX REV CODE- 250/637

## 2022-09-30 PROCEDURE — 65270000046 HC RM TELEMETRY

## 2022-09-30 PROCEDURE — 90935 HEMODIALYSIS ONE EVALUATION: CPT

## 2022-09-30 PROCEDURE — 82962 GLUCOSE BLOOD TEST: CPT

## 2022-09-30 PROCEDURE — 84100 ASSAY OF PHOSPHORUS: CPT

## 2022-09-30 PROCEDURE — 74011000250 HC RX REV CODE- 250

## 2022-09-30 PROCEDURE — 74011250636 HC RX REV CODE- 250/636

## 2022-09-30 PROCEDURE — 83735 ASSAY OF MAGNESIUM: CPT

## 2022-09-30 PROCEDURE — 97530 THERAPEUTIC ACTIVITIES: CPT

## 2022-09-30 PROCEDURE — 80061 LIPID PANEL: CPT

## 2022-09-30 PROCEDURE — 80048 BASIC METABOLIC PNL TOTAL CA: CPT

## 2022-09-30 PROCEDURE — 36415 COLL VENOUS BLD VENIPUNCTURE: CPT

## 2022-09-30 PROCEDURE — 85025 COMPLETE CBC W/AUTO DIFF WBC: CPT

## 2022-09-30 PROCEDURE — 74011250637 HC RX REV CODE- 250/637: Performed by: NURSE PRACTITIONER

## 2022-09-30 PROCEDURE — 97535 SELF CARE MNGMENT TRAINING: CPT

## 2022-09-30 PROCEDURE — 74011636637 HC RX REV CODE- 636/637

## 2022-09-30 RX ORDER — INSULIN LISPRO 100 [IU]/ML
10 INJECTION, SOLUTION INTRAVENOUS; SUBCUTANEOUS
Qty: 1 EACH | Status: CANCELLED | COMMUNITY
Start: 2022-09-30

## 2022-09-30 RX ORDER — METOPROLOL TARTRATE 25 MG/1
25 TABLET, FILM COATED ORAL EVERY 12 HOURS
Qty: 60 TABLET | Refills: 0 | Status: CANCELLED | COMMUNITY
Start: 2022-09-30

## 2022-09-30 RX ORDER — GABAPENTIN 300 MG/1
300 CAPSULE ORAL
Qty: 30 CAPSULE | Refills: 0 | Status: CANCELLED | OUTPATIENT
Start: 2022-09-30

## 2022-09-30 RX ORDER — LOSARTAN POTASSIUM 50 MG/1
50 TABLET ORAL DAILY
Qty: 30 TABLET | Refills: 0 | Status: CANCELLED | OUTPATIENT
Start: 2022-10-01

## 2022-09-30 RX ORDER — ACETAMINOPHEN 325 MG/1
650 TABLET ORAL
Status: CANCELLED | COMMUNITY
Start: 2022-09-30

## 2022-09-30 RX ORDER — OXYCODONE HYDROCHLORIDE 5 MG/1
5 TABLET ORAL
Qty: 20 TABLET | Refills: 0 | Status: CANCELLED | OUTPATIENT
Start: 2022-09-30 | End: 2022-10-07

## 2022-09-30 RX ORDER — LORAZEPAM 0.5 MG/1
0.5 TABLET ORAL
Qty: 15 TABLET | Refills: 0 | Status: CANCELLED | OUTPATIENT
Start: 2022-09-30

## 2022-09-30 RX ORDER — INSULIN GLARGINE 100 [IU]/ML
40 INJECTION, SOLUTION SUBCUTANEOUS
Qty: 1 ML | Refills: 0 | Status: CANCELLED | COMMUNITY
Start: 2022-09-30

## 2022-09-30 RX ORDER — TAMSULOSIN HYDROCHLORIDE 0.4 MG/1
0.8 CAPSULE ORAL DAILY
Qty: 60 CAPSULE | Refills: 0 | Status: CANCELLED | OUTPATIENT
Start: 2022-10-01

## 2022-09-30 RX ADMIN — METOPROLOL TARTRATE 25 MG: 25 TABLET, FILM COATED ORAL at 09:13

## 2022-09-30 RX ADMIN — FUROSEMIDE 80 MG: 40 TABLET ORAL at 09:13

## 2022-09-30 RX ADMIN — POLYETHYLENE GLYCOL 3350 17 G: 17 POWDER, FOR SOLUTION ORAL at 09:13

## 2022-09-30 RX ADMIN — SODIUM CHLORIDE, PRESERVATIVE FREE 10 ML: 5 INJECTION INTRAVENOUS at 21:50

## 2022-09-30 RX ADMIN — SODIUM CHLORIDE, PRESERVATIVE FREE 10 ML: 5 INJECTION INTRAVENOUS at 16:16

## 2022-09-30 RX ADMIN — LOSARTAN POTASSIUM 50 MG: 50 TABLET, FILM COATED ORAL at 09:13

## 2022-09-30 RX ADMIN — Medication 2 UNITS: at 12:42

## 2022-09-30 RX ADMIN — FUROSEMIDE 80 MG: 40 TABLET ORAL at 17:29

## 2022-09-30 RX ADMIN — SODIUM CHLORIDE, PRESERVATIVE FREE 10 ML: 5 INJECTION INTRAVENOUS at 06:10

## 2022-09-30 RX ADMIN — ROSUVASTATIN CALCIUM 10 MG: 10 TABLET, FILM COATED ORAL at 21:49

## 2022-09-30 RX ADMIN — HEPARIN SODIUM 5000 UNITS: 5000 INJECTION INTRAVENOUS; SUBCUTANEOUS at 16:15

## 2022-09-30 RX ADMIN — ACETAMINOPHEN 1000 MG: 500 TABLET, FILM COATED ORAL at 09:12

## 2022-09-30 RX ADMIN — Medication 10 UNITS: at 12:42

## 2022-09-30 RX ADMIN — HEPARIN SODIUM 5000 UNITS: 5000 INJECTION INTRAVENOUS; SUBCUTANEOUS at 06:12

## 2022-09-30 RX ADMIN — DOCUSATE SODIUM 50MG AND SENNOSIDES 8.6MG 1 TABLET: 8.6; 5 TABLET, FILM COATED ORAL at 09:13

## 2022-09-30 RX ADMIN — OXYCODONE 5 MG: 5 TABLET ORAL at 04:51

## 2022-09-30 RX ADMIN — GABAPENTIN 300 MG: 300 CAPSULE ORAL at 16:14

## 2022-09-30 RX ADMIN — ACETAMINOPHEN 1000 MG: 500 TABLET, FILM COATED ORAL at 17:29

## 2022-09-30 RX ADMIN — METOPROLOL TARTRATE 25 MG: 25 TABLET, FILM COATED ORAL at 21:49

## 2022-09-30 RX ADMIN — TAMSULOSIN HYDROCHLORIDE 0.8 MG: 0.4 CAPSULE ORAL at 09:13

## 2022-09-30 NOTE — ADT AUTH CERT NOTES
Cardiovascular Surgery or Procedure GRG - Care Day 13 (9/29/2022) by Gely Mcclain       Review Status Review Entered   Completed 9/30/2022 0816       Created By   [id number], [address] [facility] Day: 13 Care Date: 9/29/2022 Level of Care: Telemetry    Guideline Day 2    Level Of Care    (X) Floor    9/30/2022 08:16:44 EDT by Sri Garcia      inpatient remote telemetry    Clinical Status    (X) * No ICU or intermediate care needs    9/30/2022 08:16:44 EDT by Sri Garcia      now in remote telemetry  VS: 98.2 °F, 107, 151/70, 20, 93% non invasive cannula    Interventions    (X) Inpatient interventions continue    9/30/2022 08:16:44 EDT by Sri Garcia      POC GLUCOSE, ALSTON CATHETER CARE, BLADDER CHECKS    (X) Vascular and neurologic checks as appropriate    9/30/2022 08:16:44 EDT by Huber Tavera    * Milestone   Additional Notes    DATE: 09/29/2022      Pertinent Updates:   -extremely anxious about the many things occurring and is very worried that \"I will die. \" Significant reassurance provided. He notes he is extremely anxious and agreeable to try low dose Ativan to try and help his nerves   -Fall: No syncopal symptoms. Work-up with CT Head 9/16 with no acute abnormality and carotid duplex with no stenosis. -- Pain: Tylenol 1000mg BID standing with additional 650mg q4h PRN. Remove IV Morphine. Continue Oxycodone 5mg q4h PRN for breakthrough pain      Abnl/Pertinent Labs/Radiology/Diagnostic Studies:   RBC: 3.09 (L)   HGB: 9.5 (L)   HCT: 29.0 (L)   Sodium: 134 (L)   BUN: 33 (H)   Creatinine: 5.23 (H)   BUN/Creatinine ratio: 6 (L)   Calcium: 10.4 (H)   GFR est non-AA: 11 (L)   GLUCOSE,FAST - POC:  (H)      ECHO ADULT    Left Ventricle: Normal left ventricular systolic function with a visually estimated EF of 50 - 55%. Left ventricle size is normal. Mildly increased wall thickness. Normal wall motion.    Right Ventricle: Not well visualized. Right ventricle size is normal.   Left Atrium: Left atrium is mildly dilated. Physical Exam:   Lungs:  clear to auscultation bilaterally. Heart[de-identified] Regular rate and rhythm, S1, S2 normal, no murmur, click, rub or gallop. Abdomen: Soft, non-tender. Bowel sounds normal.    Extremities:Extremities normal, atraumatic, no cyanosis or edema. Vascular:Pulses - 2+ Rt DP. Left LE in splint, toes warm. MD Consults/Assessments & Plans:   **Vascular Surgery**   Stable post left arm fistula revision. Dressing intact, thrill palpable. Will arrange follow up in 2 weeks for staple removal.      **Nephrology**   Assessment and Plan   ESRD on HD:   - MWF at [facility] [address]: (R) permacath   - HD tomorrow per routine   - s/p AVF transposition 9/28       Urinary retention    - continue flomax        HTN:   - cont current meds       Anemia of CKD:   - 9/20 Hgb stable without ADONIS can resume outpatient has his HD clinic       Secondary HPT       L distal fibula fracture:   - s/p ORIF 9/20       DM2   Obesity   Noncompliance      **Cardiology**   Assessment and Plan   1. Afib: new diagnosis   -Uncertain time of onset, however he was not in afib on arrival to ER 9/16/22. No afib noted on recorded tele strips through 9/19/22. Pre-procedure anesthesia assessment did indicate RRR. He wasOff tele 9/19/22. He has no history of palpitations so asymptomatic.    -Heart rate is overall controlled (<110- 80's. ) with no rate control medications.    -Start  metoprolol 25 mg BID   -TSH is 0.25, Free T4 NL  Check electrolytes. -Check echocardiogram- there is a question of possible history of CHF but unclear. -[id number] least 3. Recommend start on IV heparin for stroke ppx. Contacted Dr. Eladio Vega who recommends delay starting heparin until tomorrow due to LUE bleed risk post procedure. Will start therapeutic IV heparin tomorrow WITHOUT heparin bolus. 2.  History of HTN:   -BP currently controlled. -Continue losartan 50 mg daily (home dose 100 mg but BP soft at times)   3. Hypothyroidism, Thyroid nodule   On synthroid. TSH is 0.25, Free T4 is Nl   Thyroid nodule noted on carotid US- defer to primary team.   4.  Suspected GONZALO:     -Needs outpatient sleep study   5. RBBB, LAFB: unclear if new or old   10. DM type II: uncontrolled   -Hgb A1C=12.8   7. Anemia, chronic    8. Tobacco abuse: advise cessation. 9.   L distal fibula fracture: s/p ORIF 9/20/22      **Hospitalist**   Assessment & Plan:   Left ankle dislocation, left distal fibula fracture s/p mechanical fall   S/p Closed reduction in ED   ORIF on 9/20/20 with Dr. Katelyn Cook   Mechanical fall, \"legs buckled\"   -- Appreciate Orthopedic Surgery involvement. Signed off on 9/23/22. -- Per Ortho Sign off note:   - DVT ppx - Heparin daily in hospital; defer to medicine based on co-morbidities; needs coverage until follow up appointment; SCDs   - PT/OT - NWB LLE   - Pain - continue current Meds; Ice, Elevation   - Dressing - Leave in place if it remains dry and intact; change as needed for saturation with dry sterile island dressing   -- Fall: No syncopal symptoms. Work-up with CT Head 9/16 with no acute abnormality and carotid duplex with no stenosis. -- Pain: Tylenol 1000mg BID standing with additional 650mg q4h PRN. Remove IV Morphine. Continue Oxycodone 5mg q4h PRN for breakthrough pain   ESRD on HD MWF    Anemia of CKD   Tortuosity of LUE fistula, s/p AVF transposition on 9/28/22 with Dr. Daljit Freeman   -- Nephrology following   -- Anemia: Can resume ADONIS at outpatient HD clinic   -- [address] Vascular Surgery involvement. Signed off 9/29. Will need follow-up in 2 weeks for staple removal.    -- Continues Lasix 80mg BID   Atrial Fibrillation   -- Onset on 9/28 after OR with vascular surgery   -- Cardiology consulted   -- Rate Control: Start Metoprolol Tartrate 25mg BID   -- TTE ordered   -- CHADS2 VASC score is 3.  Defer to cardiology regarding anticoagulation for stroke prevention. Their note discusses IV Heparin gtt to start 9/30   Panic attacks, anxiety    -- Felt due to abrupt discontinuation of chronic gabapentin and therefore was resumed at 300mg MWF dosing   -- Ongoing significant anxiety/fear around medical procedures and chronic conditions. Will benefit from long-term follow-up with counselor/psychiatry. Start Ativan 0.5mg BID PRN with hold parameters   Incidental large left thyroid nodule on Carotic Duplex 9/16   -- Needs to follow-up with PCP and completed dedicated imaging    Hypertension   -- Overall stable. Continues on home Losartan 50mg daily   DM2, a1c 12.8, uncontrolled   -- Continues on Lantus 40 units nightly, Lispro 10 units with meals, and ISS   Hypothyroidism   -- TSH 0.25 on 9/18/22. Team has been holding his Levothyroxine this admission. BPH   -- Continue home Flomax 0.8mg daily   Constipation   -- Daily Miralax and Pericolace. Add Lactulose PRN. Encouraged use. If fails will try suppository          Medications:   Tylenol 1000mg BID PO   Lasix 80mg BID PO   Heparin 5000unit q8 SQ   Humalog QID SSIN SQ 2unit 1x   Humalog 10unit TID SQ 1x   Cozaar 50mg every day PO   Lopressor 25mg q12 PO   Roxicodone 5mg q4 prn PO 1x   Miralax 17g every day PO   Crestor 10mg HS PO   Pericolace 1tab every day PO   Flomax 0.8mg every day PO   Chronulac 15ml PO 1x      PT/OT/SLP/CM Assessments or Notes:   **Physical therapy**   ASSESSMENT   Focus of session on transfer training bed<->drop arm chair using lateral technique due to pt difficulty maintaining NWB L LE with standing. Pt required increased time and mod A x 2 for scooting, cues for sequence. Noted improved ability to scoot with return to bed from chair. Nursing students and PCT requested assist with pt transfer back to bed. Instructed in lateral technique and NWB L LE, observed pt transfer chair to bed with therapy.        PLAN :   Goals have been updated based on progression since last assessment. Patient continues to benefit from skilled intervention to address the above impairments      **Occupational Therapy**   ASSESSMENT   Pt presents at mod assist x 1 with bed mobility and mod to max assist x 2 with lateral transfers. Pt s/p POD#1, L UE AV fistula placement and reporting increase pain. Pt not able to use RW at this time d/t pain L arm and performed lateral transfer from bed to chair, 30 min later, assisted with chair to bed. Less assistance noted with chair to bed. Overall, pt making slow progress. Limited by pain, NWB L LE, endurance, strength, and morbid obesity. PLAN :   Patient continues to benefit from skilled intervention to address the above impairments. Continue treatment per established plan of care to address goals.                   Cardiovascular Surgery or Procedure GRG - Care Day 12 (9/28/2022) by Katie Lara       Review Status Review Entered   Completed 9/29/2022 1046       Created By   [id number] Day: 12 Care Date: 9/28/2022 Level of Care: Telemetry    Guideline Day 1    Level Of Care    ( ) OR to ICU or intermediate care    9/29/2022 10:46:52 EDT by Ellen Herrera      inpatient remote telemetry    Clinical Status    (X) * Clinical Indications met    9/29/2022 10:46:52 EDT by Ellen Herrera      Diagnosis: END STAGE RENAL DISEASE  VS: 98 F, 108, 99/51, 23, 95%    (X) * Procedure completed    9/29/2022 10:46:52 EDT by Radhames, [address] 9/28    Interventions    (X) Inpatient interventions as needed    9/29/2022 10:46:52 EDT by Ellen Herrera      POC GLUCOSE, ALSTON CATHETER CARE, BLADDER CHECKS    (X) Vascular and neurologic checks    9/29/2022 10:46:52 EDT by Barrie Verdin CHECKS ORDERED    * Milestone   Additional Notes    DATE: 09/28/2022      Pertinent Updates:   -S/P LEFT UPPER ARM CEPHALIC VEIN TRANSPOSITION FISTULA today Abnl/Pertinent Labs/Radiology/Diagnostic Studies:   GLUCOSE,FAST - POC:       Physical Exam:   Lungs: Clear to auscultation bilaterally. Chest wall: No tenderness or deformity. Heart: Regular rate and rhythm, S1, S2 normal, no murmur. Abdomen: Obese, soft, non-tender. Bowel sounds normal.    Extremities:Left ankle is covered with dry and clean dressing and bandage. Extremities normal, atraumatic, no cyanosis or edema. MD Consults/Assessments & Plans:   **nephrology**   Assessment and Plan   ESRD on HD:   - MWF at [facility]   - Access: (R) permacath   - HD today   - appreciate vascular help with  AVF revision        Urinary retention    - continue flomax        HTN:   - cont current meds       Anemia of CKD:   - 9/20 Hgb stable without ADONIS can resume outpatient has his HD clinic       Secondary HPT       L distal fibula fracture:   - s/p ORIF 9/20       DM2   Obesity   Noncompliance      **Hospitalist**   Assessment/Plan:   Left ankle dislocation, left distal fibula fracture s/p mechanical fall   S/p Closed reduction in ED   ORIF on 9/20/20   Strict ice/elevation left leg for edema control. Weight bearing restriction NWB left leg x 3 months. Pain control . Mild leukocytosis likely reactive 2/2 this- resolved. Repeat labs        ESRD on HD MWF anemia of CKD    MWF at [facility], Nephrology following.        Volume overload:   Improving attempting to pull 4-4.5 kg each HD       Panic attacks, anxiety 9/18-- now resolved    Likely 2/2 the abrupt discontinuation of chronic gabapentin which can cause irritability, tachycardia, diaphoresis (ie anxiety), therefore resumed gabapentin at 300 bid for now       Mechanical fall, \"legs buckled\"   Denies any pre-syncopal symptoms   CT head no acute abnormality   Carotid Doppler w/o b/l ICA stenosis, VA s patent,    Incidental large left thyroid nodule - f/up PCP for thyroid US FNAC        Hypertension   BP on the lower side of normal, better today       DM2, a1c 12.8, uncontrolled   SSI, Lantus, Humalog- cont to adjust PRN       Hypothyroidism   TSH low at 0.25, continue to hold Levothyroxine       Hyponatremia   Likely SIADH from ankle fracture, stress, pain, anxiety, resolving. Monitor. May correct with hemodialysis sodium 129 repeat labs       Constipation   -Start with daily polyethylene glycol and monitor with other bowel regimen including Dulcolax. **Cardiology**   Called about pt with new afib, post fistula placement. HR/BP ok and for now, no rate control agent needed. Can give 5 mg IV metoprolol if HR sustained >110 bpm overnight      **OP NOTE**   DATE OF SERVICE:  09/28/2022   PREOPERATIVE DIAGNOSIS:  Renal failure. POSTOPERATIVE DIAGNOSIS:  Renal failure. PROCEDURE PERFORMED:  Left upper arm cephalic vein transposition fistula. ANESTHESIA:  Regional block. COMPLICATIONS:  None. SPECIMENS REMOVED:  None. IMPLANTS:  None. ESTIMATED BLOOD LOSS:  25 mL. INDICATIONS:  The patient is a 44-year-old male with end-stage renal disease, on hemodialysis. He had a left brachial cephalic fistula placed one year ago. The fistula has developed well but is quite tortuous and the dialysis units are not able to cannulate it. It will be transposed and straightened. PROCEDURE:  The patient's left arm was prepped and draped. A transverse incision was made at the antecubital level. The fistula was dissected free circumferentially. Two additional longitudinal incisions were made in an interrupted fashion in the upper arm overlying the fistula. The fistula was further dissected free from the antecubital level to the proximal upper arm. It was then clamped and divided at the antecubital level and removed from its previous anatomic location. It was then tunneled just medial to the previously made incisions just beneath the skin surface. The fistula was straightened as best possible. A 2- to 3-cm segment was excised.   The fistula was then reapproximated end-to-end using running 5-0 Prolene. At the completion, there was a good thrill in the fistula. The incisions were closed with Vicryl subcutaneous suture and skin staples. Dressings were applied, and the patient was returned to the recovery room in stable condition. Medications:   Tylenol 1000mg BID PO 1x   Buminate 50g prn IV 1x   Lasix 80mg BID PO 1x   Heparin 2200unit prn IK 1x and 2100unit prn IK 1x   Lantus 40unit HS SQ   Cozaar 50mg every day PO   Zofran 4mg q6 prn IV 1x   Roxicodone 5mg q4 prn PO 2x   Crestor 10mg HS PO   Pericolace 1tab every day PO   Flomax 0.8mg every day PO   Ancef 3g IV 1x   Heparin 2000unit continues prn IV 1x   Fentanyl citrate 25mcg IV 1x and 50mcg IV 1x   Versed 0.5mg IV 1x and 3mg IV 1x         PT/OT/SLP/CM Assessments or Notes:   ****   DISPOSITION: The disposition plan is SNF:   SNF pending: Only facilities in network with [address]: Updated [address] Notes Needed, Placement, [address], Transport for HD,    Per conversation with the [facility] Marissa Lambert; The facility is unable to accommodate as the pt is affiliated with [facility]. She reported that they were notified when the insurance company requested updated clinicals.  This cm informed her that it was not documented that the pt is affiliated with [facility] and the pt had not notified the CM team prior to sending out referrals              Ankle Fracture, Closed, Open Reduction, Internal Fixation (ORIF) - Care Day 11 (9/27/2022) by Tonja Arreaga       Review Status Review Entered   Completed 9/27/2022 1550       Created By   [address] [place] [address] Day: 11 Care Date: 9/27/2022 Level of Care: Inpatient Floor    Guideline Day 1    Level Of Care    ( ) OR to recovery room to discharge    9/27/2022 15:46:01 EDT by Tonja Arreaga      Orthopedis    Clinical Status    (X) * Procedure completed    9/27/2022 15:46:01 EDT by Tonja Arreaga      Left ankle trimalleolar ankle fracture open reduction and internal fixation, Left ankle syndesmosis fixation, Left ankle fluoroscopic inversion stress evaluation intraoperatively    (X) * Adequate supported ambulation    9/27/2022 15:46:01 EDT by Arcenio Patel      -Patient continues with skilled PT/OT services and is progressing towards goals.  -Utilize gait belt for transfer/ambulation, walker, cane, or other assistive device    (X) * Pain absent or managed    9/27/2022 15:46:01 EDT by Arcenio Patel      Post-op, left ankle pain 9-8/10    (X) * Other injuries requiring inpatient care absent    9/27/2022 15:46:01 EDT by Arcenio Patel      No other injuries reported    (X) * No evidence of postoperative or surgical site infection    9/27/2022 15:46:01 EDT by Arcenio Patel      Infection not noted, Left ankle is covered with dry and clean dressing and bandage. ( ) * Discharge plans and education understood    Activity    (X) * Ambulatory or acceptable for next level of care    9/27/2022 15:46:01 EDT by Arcenio Patel      PT Eval: Activity tolerance is Fair, requires rest breaks, and observed SOB with activity without desaturation. OT Eval:  [place] [facility] [place] mobility and functional transfers. Routes    (X) * Oral hydration    9/27/2022 15:46:01 EDT by Arcenio Patel      Patient can tolerate oral hydration without issue or fluid restriction noted    (X) * Oral medications or regimen acceptable for next level of care    9/27/2022 15:46:01 EDT by Arcenio Patel      Lasix 80mg PO BID, Cozaar 50mg PO qd, Crestor 10mg PO hs    (X) * Oral diet or acceptable for next level of care    9/27/2022 15:46:01 EDT by Arcenio Patel      Regular adult diet.   NPO effective midnight with sips of water with meds for fistula tranposition tomorrow    Interventions    (X) Cast, splint, or brace    9/27/2022 15:46:01 EDT by Arcenio Patel      left foot splint, dressing D/I    (X) Gait training    9/27/2022 15:46:01 EDT by Jose Hermosillo      With PT/OT    Medications    (X) Multimodal analgesia    9/27/2022 15:46:01 EDT by Jose Hermosillo      Tylenol 1000mg PO BID, Morphine 4mg IV q3h PRN x1, Roxicodone 5mg PO q4h PRN x2    * Milestone   Additional Notes   DATE: 09/27/22      Pertinent Updates:   Per Vascular Surgery, plan for fistula transposition tomorrow afternoon. PT Update: Pt reports flat pain rating of 10/10 every time he is asked. Pt pain with use of FLACC objective measurement pain scale: Face 1, Legs 1, Activity 1, Cry 2, Consolability 1 for a score of 6/10 with movement. OT Update: Patient's ADLs functioning upto total assist still limited by fear, pain 10/10, generalized weakness and balance. Vitals:   97.9 °F (36.6 °C) 70 160/75 14 97% RA       Abnl/Pertinent Labs/Radiology/Diagnostic Studies:   GLUCOSE,FAST - POC: 135 (H),  124 (H)      Physical Exam:   General: Alert, cooperative, no distress, appears stated age. Patient is comfortable at bed. He is not at any distress. I did not observe him any cough while I was at the room. Lungs: Clear to auscultation bilaterally. Chest wall: No tenderness or deformity. Heart: Regular rate and rhythm, S1, S2 normal, no murmur. Abdomen: Obese, soft, non-tender. Bowel sounds normal.    Extremities: Left ankle is covered with dry and clean dressing and bandage. Extremities normal, atraumatic, no cyanosis or edema. Pulses: 2+ and symmetric all extremities. Skin: Skin color, texture, turgor normal. No rashes or lesions   Neurologic: Patient has clear voice. He follows command. MD Consults/Assessments & Plans:   HOSPITALIST:   Left ankle dislocation, left distal fibula fracture s/p mechanical fall   S/p Closed reduction in ED   ORIF on 9/20/20   Strict ice/elevation left leg for edema control. Weight bearing restriction NWB left leg x 3 months. Pain control .       ESRD on HD MWF anemia of CKD    MWF at [facility] Volume overload:   Attempting to pull 4-4.5 kg each HD       Panic attacks, anxiety   Likely 2/2 the abrupt discontinuation of chronic gabapentin which can cause irritability, tachycardia, diaphoresis (ie anxiety), therefore resumed gabapentin at 300 bid for now       DM2, a1c 12.8, uncontrolled   SSI, Lantus, Humalog- cont to adjust PRN       Hypothyroidism   TSH low at 0.25, continue to hold Levothyroxine       Hyponatremia   Likely SIADH from ankle fracture, stress, pain, anxiety,  Monitor. May correct with hemodialysis sodium 129 repeat labs       Constipation   -Start with daily polyethylene glycol and monitor with other bowel regimen including Dulcolax.        DVT prophylaxis subcu brain   PT OT Case management consult awaiting SNF placement      NEPHROLOGY:   ESRD on HD:   - MWF at [facility]   - Access: (R) permacath   - HD tomorrow    - AVF revision tomorrow        Urinary retention    - continue flomax        HTN:   - cont current meds       Anemia of CKD:   - 9/20 Hgb stable without ADONIS can resume outpatient has his HD clinic       Secondary HPT       L distal fibula fracture:   - s/p ORIF 9/20       DM2   Obesity   Noncompliance      Medications:   Lantus 40 units SC hs, Humalog 10 units SC TID       PT Assessment:    Current Level of Function Impacting Discharge (mobility/balance): min A-mod A x2   Recommendation for discharge: Therapy 3 hours per day 5-7 days per week      OT Assessment:   Current Level of Function Impacting Discharge (ADLs): upto total assist with ADLs, NWB L LE, dialysis, pain   Recommendation for discharge: Therapy 3 hours per day 5-7 days per week      CM Notes:    RUR- Low 14%    [facility]

## 2022-09-30 NOTE — PROGRESS NOTES
Physician Progress Note      PATIENT:               Melissa Campbell  CSN #:                  [id number]  :                       1960  ADMIT DATE:       2022 9:32 AM  DISCH DATE:  RESPONDING  PROVIDER #:        CLAY LYONS PA-C          QUERY TEXT:    Pt admitted with ankle dislocation. Pt noted to have CHF. If possible, please document in progress notes and discharge summary if you are evaluating and/or treating any of the following: The medical record reflects the following:  Risk Factors: CHF  Clinical Indicators:  card consult on   He also reports prior history of \"enlarged heart\" and possible CHF    ECHO  -  Left? Ventricle: Normal left ventricular systolic function with a visually estimated EF of 50 - 55%. Left ventricle size is normal. Mildly increased wall thickness. Normal wall motion.  -  Right? Ventricle: Not well visualized. Right ventricle size is normal.  -  Left? Atrium: Left atrium is mildly dilated. Treatment:  no treatment    Thank you,  Estefania Connor RN [facility]  Clinical Documentation  [phone] or via [address] provided:  -- Chronic Systolic CHF/HFrEF  -- Chronic Diastolic CHF/HFpEF  -- Chronic Systolic and Diastolic CHF  -- Other - I will add my own diagnosis  -- Disagree - Not applicable / Not valid  -- Disagree - Clinically unable to determine / Unknown  -- Refer to Clinical Documentation Reviewer    PROVIDER RESPONSE TEXT:    Provider disagreed with this query.     Query created by: Zonia Adkins on 2022 4:30 PM      Electronically signed by:  Arpit Mitchell PA-C 2022 4:33 PM

## 2022-09-30 NOTE — PROGRESS NOTES
[facility]  Cardiology Care Note                  []Initial visit     [x]Established visit     Patient Name: Sofie Quezada - RUB:[id number] - BSP:[id number]  Primary Cardiologist: none  Consulting Cardiologist:      Reason for initial visit: afib    HPI:   Mr. Nikos Lezama is a 58 y.o. male with PMH of HTN, DM type II, ESRD on HD MWF, hypothyroidism, morbid obesity. He also reports prior history of \"enlarged heart\". for which he saw a cardiologist couple years ago but the cardiologist left the practice and he never followed up. He presented 9/16/22 with chief c/o fall when his legs \"gave out\" and sustained left ankle fractureand subsequently underwent surgical repair. He underwent new LUE AV fistula placement yesterday as prior AV fistula was tortuous. During surgery, anesthesia noted that pt was in afib throughout surgery. Initial EKG on presentation showed NSR) and subsequent telemetry strips available through 9/19/22 showed NSR. His heart rate has been controlled without rate control medications. No history of afib or palpitations. Denies any history of chest pain. Has occasional SOB which he has attributed to smoking >1PPD. He states he ambulates around house, walks dog with occasional SOB but not limiting. Has orthopnea but denies BLE edema except if he missed dialysis. Has been on HD for couple of years. States he had heart catheterization years ago but no MI or stents. Reports he has been told he has GONZALO but has never had a sleep study. He reports he has a lot of anxiety, wants to go home. He on the verge of tears frequently during conversation. Subjective: remains in afib, HR controlled. Several pauses but none greater than 2.21 seconds. He denies chest pain, SOB. Denies dizziness at rest or when he gets OOB. He states he feels fatigued and has not been sleeping well.       Assessment and Plan Afib: Presumed new diagnosis: Currently rate controlled with metoprolol  -Echocardiogram overall normal EF and no regional wall motion normalities or valvular heart disease. LA mildly dilated suggesting possible intermittent episodes of A. fib in the past as well. -[id number]=dd least 3. Oral anticoagulation at discharge. . D/w Dr. Janki Gonzalez who recommends Warfarin given pt's BMI. Please start warfarin at discharge. 2.  History of HTN:  -BP overall controlled. -Continue losartan 50 mg daily (home dose 100 mg but BP soft at times)    3. Bifascicular block    4. Hypothyroidism, Thyroid nodule  -On synthroid. Free T4 NLThyroid nodule noted on carotid US- defer to primary team.    5.  Suspected GONZALO:    -Needs outpatient sleep study. Will order for outpatient. Other comorbids:  6. DM type II: uncontrolled  -Hgb A1C=12.8  7. Anemia, chronic. Stable at 9.4  8. Tobacco abuse: advise cessation. 9.  L distal fibula fracture: s/p ORIF 22    He should have a follow-up with cardiology as an outpatient. We will set it up with Dr. Deo Atkins in about 2 weeks. Can be discharged to rehab from a cardiac standpoint. Defer to primary team.  Cardiology will see as needed. ____________________________________________________________    Cardiac testing        22    ECHO ADULT COMPLETE 2022    Interpretation Summary    Left Ventricle: Normal left ventricular systolic function with a visually estimated EF of 50 - 55%. Left ventricle size is normal. Mildly increased wall thickness. Normal wall motion. Right Ventricle: Not well visualized. Right ventricle size is normal.    Left Atrium: Left atrium is mildly dilated. Signed by: Sohan Barrientos MD on 2022  7:18 AM          Carotid doppler:   no carotid artery stenosis, Vertebral arteries patent. Left thyroid nodule      SH: smokes at least 1 ppd, no ETOH use, no illicit drugs. Lives with his wife.   FH:  Mom  of DM and CHF (age 80), Dad had alzheimers. Most recent HS troponins:  No results for input(s): TROPHS in the last 72 hours. No lab exists for component:  CKMB  ECG: afib with RBBB, LAFB,  Review of Systems    [x]All other systems reviewed and all negative except as written in HPI    [] Patient unable to provide secondary to condition    Past Medical History:   Diagnosis Date    Anemia     Chronic kidney disease     Diabetes ([facility] [address]     Dorsalgia     ESRD on hemodialysis ([facility] [address]     Gastro-esophageal reflux     Heart failure ([facility] [address]     Hypertension     Obesity     Polyneuropathy     Sciatica      History reviewed. No pertinent surgical history. Social Hx:  reports that he has been smoking cigarettes. He has been smoking an average of .5 packs per day. He has never used smokeless tobacco.  Family Hx: family history is not on file. Allergies   Allergen Reactions    Adhesive Rash     Paper tape ok    Bactrim [Sulfamethoprim] Rash          OBJECTIVE:  Wt Readings from Last 3 Encounters:   09/29/22 308 lb (139.7 kg)       Intake/Output Summary (Last 24 hours) at 9/30/2022 2359  Last data filed at 9/30/2022 0200  Gross per 24 hour   Intake --   Output 600 ml   Net -600 ml         Physical Exam    Vitals:   Vitals:    09/30/22 2245 09/30/22 2300 09/30/22 2330 09/30/22 2345   BP: 113/76 116/69 (!) 140/71 (!) 146/77   Pulse: 78 68 76 76   Resp: 18 18 18 18   Temp:       TempSrc:       SpO2:       Weight:       Height:         Telemetry: afib, HR controlled  General:    Alert, cooperative, no distress, appears stated age. Neck:   Supple,    Back:    Not examined   Lungs:     clear to auscultation bilaterally. Heart[de-identified]  Irregular rate and rhythm, S1 and S2 are variable. No rubs murmurs or gallops. Abdomen:     Soft, non-tender. Bowel sounds normal.    Extremities:   Extremities normal, atraumatic, no cyanosis or edema. Vascular:   Pulses - 2+ Rt DP. His left arm is in splint.    Skin:   Skin color normal. No rashes or lesions on visible areas   Neurologic:   Alert, Moves all extremities. Data Review:     Radiology:   XR Results (most recent):  Results from Hospital Encounter encounter on 09/16/22    XR ANKLE LT MIN 3 V    Narrative  INTERPRETATION PROVIDED FOR COMPLIANCE ONLY AT NO CHARGE    FINDINGS: Intraoperative spot radiographs of the left ankle were obtained during  open reduction and internal fixation of a trimalleolar fracture. Alignment is  nearly anatomic. No operative complication is evident. Impression  Left ankle ORIF in progress. CT Results (most recent):  Results from Hospital Encounter encounter on 09/16/22    CT HEAD WO CONT    Narrative  EXAM: CT HEAD WO CONT    INDICATION: fall, head injury, anticoagulated    COMPARISON: None. CONTRAST: None. TECHNIQUE: Unenhanced CT of the head was performed using 5 mm images. Brain and  bone windows were generated. Coronal and sagittal reformats. CT dose reduction  was achieved through use of a standardized protocol tailored for this  examination and automatic exposure control for dose modulation. FINDINGS:  The ventricles and sulci are normal in size, shape and configuration. . There is  no significant white matter disease. There is no intracranial hemorrhage,  extra-axial collection, or mass effect. The basilar cisterns are open. No CT  evidence of acute infarct. The bone windows demonstrate no abnormalities. The visualized portions of the  paranasal sinuses and mastoid air cells are clear. Vascular calcification is  noted. Impression  No acute abnormality. MRI Results (most recent):  No results found for this or any previous visit. No results for input(s): CPK, TROIQ in the last 72 hours.     No lab exists for component: CKQMB, CPKMB, BMPP  Recent Labs     09/30/22  0100 09/29/22  0947   * 134*   K 4.1 3.8   CL 97 98   CO2 25 30   BUN 43* 33*   CREA 6.17* 5.23*   * 94   PHOS 5.8*  --    CA 9.9 10.4*     Recent Labs     09/30/22  0100 09/29/22  0947   WBC 7.1 7.3   HGB 9.4* 9.5*   HCT 28.1* 29.0*    225     No results for input(s): PTP, INR, AP, INREXT, INREXT in the last 72 hours. No lab exists for component: PTTP, GPT, SGOT  Recent Labs     09/30/22  0100   CHOL 80   LDLC 36.2     No results for input(s): CRP, TSH, TSHEXT, TSHEXT in the last 72 hours.     No lab exists for component: ESR        Current meds:    Current Facility-Administered Medications:     epoetin rico-epbx (RETACRIT) injection 20,000 Units, 20,000 Units, SubCUTAneous, Q MON, WED & FRI, Aaron Oneil MD    LORazepam (ATIVAN) tablet 0.5 mg, 0.5 mg, Oral, BID PRN, Lampugnale, Cy A, PA-C, 0.5 mg at 09/29/22 1144    metoprolol tartrate (LOPRESSOR) tablet 25 mg, 25 mg, Oral, Q12H, Marek Cathryne Soda., NP, 25 mg at 09/30/22 2149    hydrALAZINE (APRESOLINE) 20 mg/mL injection 10 mg, 10 mg, IntraVENous, Q4H PRN, Lampugnale, Cy A, PA-C    heparin (porcine) injection 5,000 Units, 5,000 Units, SubCUTAneous, Q8H, Pierre Maya MD, 5,000 Units at 09/30/22 1615    tamsulosin (FLOMAX) capsule 0.8 mg, 0.8 mg, Oral, DAILY, Meghna PUGA MD, 0.8 mg at 09/30/22 0913    polyethylene glycol (MIRALAX) packet 17 g, 17 g, Oral, DAILY, Pierre Maya MD, 17 g at 09/30/22 0913    bisacodyL (DULCOLAX) suppository 10 mg, 10 mg, Rectal, DAILY PRN, Pierre Maya MD    losartan (COZAAR) tablet 50 mg, 50 mg, Oral, DAILY, Pierre Maya MD, 50 mg at 09/30/22 0913    rosuvastatin (CRESTOR) tablet 10 mg, 10 mg, Oral, QHS, Pierre Maya MD, 10 mg at 09/30/22 2149    furosemide (LASIX) tablet 80 mg, 80 mg, Oral, BID, Pierre Maya MD, 80 mg at 09/30/22 1729    hydrOXYzine HCL (ATARAX) tablet  mg,  mg, Oral, Q8H PRN, Pierre Maya MD, 25 mg at 09/26/22 2257    sodium chloride (NS) flush 5-40 mL, 5-40 mL, IntraVENous, Q8H, Meghna PUGA MD, 10 mL at 09/30/22 2150    sodium chloride (NS) flush 5-40 mL, 5-40 mL, IntraVENous, PRN, Pierre Maya MD oxyCODONE IR (ROXICODONE) tablet 5 mg, 5 mg, Oral, Q4H PRN, Asher Messer MD, 5 mg at 09/30/22 0451    naloxone [facility]) injection 0.4 mg, 0.4 mg, IntraVENous, PRN, Asher Messer MD    [COMPLETED] gabapentin (NEURONTIN) capsule 300 mg, 300 mg, Oral, BID, 300 mg at 09/20/22 2242 **FOLLOWED BY** [COMPLETED] gabapentin (NEURONTIN) capsule 300 mg, 300 mg, Oral, ACD, 300 mg at 09/24/22 1711 **FOLLOWED BY** gabapentin (NEURONTIN) capsule 300 mg, 300 mg, Oral, DIALYSIS MON, WED & Carren Embarrass, Asher Messer MD, 300 mg at 09/30/22 1614    insulin glargine (LANTUS) injection 40 Units, 40 Units, SubCUTAneous, QHS, Asher Messer MD, 40 Units at 09/28/22 2234    senna-docusate (PERICOLACE) 8.6-50 mg per tablet 1 Tablet, 1 Tablet, Oral, DAILY, Asher Messer MD, 1 Tablet at 09/30/22 0913    acetaminophen (TYLENOL) tablet 1,000 mg, 1,000 mg, Oral, BID, Asher Messer MD, 1,000 mg at 09/30/22 1729    insulin lispro (HUMALOG) injection 10 Units, 10 Units, SubCUTAneous, TIDAC, Asher Messer MD, 10 Units at 09/30/22 1242    insulin lispro (HUMALOG) injection, , SubCUTAneous, AC&HS, Asher Messer MD, 2 Units at 09/30/22 1242    glucose chewable tablet 16 g, 4 Tablet, Oral, PRN, Asher Messer MD    glucagon (GLUCAGEN) injection 1 mg, 1 mg, IntraMUSCular, PRN, Asher Messer MD    dextrose 10 % infusion 0-250 mL, 0-250 mL, IntraVENous, PRN, Asher Messer MD    albumin human 25% (BUMINATE) solution 25 g, 25 g, IntraVENous, DIALYSIS PRN, Asher Messer MD, 50 g at 09/28/22 1155    sodium chloride (NS) flush 5-40 mL, 5-40 mL, IntraVENous, Q8H, Trinity PUGA MD, 10 mL at 09/30/22 1616    sodium chloride (NS) flush 5-40 mL, 5-40 mL, IntraVENous, PRN, Asher Messer MD    ondansetron (ZOFRAN ODT) tablet 4 mg, 4 mg, Oral, Q8H PRN **OR** ondansetron (ZOFRAN) injection 4 mg, 4 mg, IntraVENous, Q6H PRN, Asher Messer MD, 4 mg at 09/28/22 1059    acetaminophen (TYLENOL) tablet 650 mg, 650 mg, Oral, Q4H PRN, 650 mg at 09/23/22 2125 **OR** acetaminophen (TYLENOL) suppository 650 mg, 650 mg, Rectal, Q6H PRN, Karla Hidalgo MD    heparin (porcine) 1,000 unit/mL injection 2,200 Units, 2,200 Units, InterCATHeter, DIALYSIS PRN, 2,200 Units at 09/28/22 1321 **AND** heparin (porcine) 1,000 unit/mL injection 2,100 Units, 2,100 Units, InterCATHeter, DIALYSIS PRN, Karla Hidalgo MD, 2,100 Units at 09/28/22 [id number] [address], MD  [facility] [address] [place] [address] 13, [address] [facility] [address]  [facility]  [phone]      Ruthann Conde MD

## 2022-09-30 NOTE — PROGRESS NOTES
Problem: Self Care Deficits Care Plan (Adult)  Goal: *Acute Goals and Plan of Care (Insert Text)  Description: FUNCTIONAL STATUS PRIOR TO ADMISSION: Patient was modified independent using a rollator for functional mobility. HOME SUPPORT: The patient lived with spouse and required some assistance with self-care tasks. Occupational Therapy Goals  Initiated 9/22/2022, Reviewed 9/29/22, goals remain appropriate  1. Patient will perform bathing with minimal assistance/contact guard assist within 7 day(s). 2.  Patient will perform lower body dressing with moderate assistance  within 7 day(s). 3.  Patient will perform toilet transfers with moderate assistance  within 7 day(s). 4.  Patient will perform all aspects of toileting with moderate assistance  within 7 day(s). 5.  Patient will participate in upper extremity therapeutic exercise/activities with supervision/set-up for 10 minutes within 7 day(s). 6.  Patient will utilize energy conservation techniques during functional activities with verbal cues within 7 day(s). Outcome: Progressing Towards Goal   OCCUPATIONAL THERAPY TREATMENT  Patient: Kenneth De Leon (31 y.o. male)  Date: 9/30/2022  Diagnosis: Syncope [R55]  Bimalleolar fracture of left ankle [S82.842A] <principal problem not specified>  Procedure(s) (LRB):  LEFT UPPER ARM CEPHALIC VEIN TRANSPOSITION FISTULA (Left) 2 Days Post-Op  Precautions: NWB, Fall (LLE)  Chart, occupational therapy assessment, plan of care, and goals were reviewed. ASSESSMENT  Patient continues with skilled OT services and is progressing towards goals. Nursing cleared for therapy. Patient in bed agreeable to therapy with encouragement. He reports of pressure feeling of pain need to urinate, saldivar intact. Supine > sit with min A and good static sitting balance with participation of UB and thigh bathing with set up and supervision. Sit > stand with mod AX2 at RW level with good adherence to L LE NWB.   Unable to participate in standing ADL tasks secondary to need for BUE support on RW. Attempted hopping laterally to [facility], unable to complete. Returned to supine and continued c/o bladder pressure and need to urinate, alerted nurse. Current Level of Function Impacting Discharge (ADLs): UB care set up and supervision, sit <> stand mod A to prepare for transfers    Other factors to consider for discharge: Patient is below baseline for ADL tasks and currently LLE NWB. Recommend IPR once medically stable. PLAN :  Patient continues to benefit from skilled intervention to address the above impairments. Continue treatment per established plan of care to address goals. Recommend with staff: bed level toileting, Ginette Teodoro or lateral transfer with x2 assist to toilet    Recommend next OT session: per POC    Recommendation for discharge: (in order for the patient to meet his/her long term goals)  Therapy 3 hours per day 5-7 days per week    This discharge recommendation:  Has been made in collaboration with the attending provider and/or case management    IF patient discharges home will need the following DME: AE: long handled bathing, AE: long handled dressing, bedside commode, walker: rolling, and wheelchair       SUBJECTIVE:   Patient stated It feels like I need to pee.     OBJECTIVE DATA SUMMARY:   Cognitive/Behavioral Status:  Neurologic State: Alert  Orientation Level: Oriented X4  Cognition: Follows commands             Functional Mobility and Transfers for ADLs:  Bed Mobility:  Rolling: Contact guard assistance;Minimum assistance  Supine to Sit: Contact guard assistance;Minimum assistance  Sit to Supine: Minimum assistance  Scooting: Contact guard assistance;Minimum assistance; Additional time    Transfers:  Sit to Stand: Moderate assistance;Assist x2          Balance:  Sitting: Intact  Standing: Impaired; With support  Standing - Static: Poor;Fair;Constant support  Standing - Dynamic : Poor;Fair;Constant support    ADL Intervention:     Patient instructed and indicated understanding the benefits of maintaining activity tolerance, functional mobility, and independence with self care tasks during acute stay  to ensure safe return home and to baseline. Encouraged patient to increase frequency and duration OOB, be out of bed for all meals, perform daily ADLs (as approved by RN/MD regarding bathing etc), and preparing to perform functional mobility to/from [facility]. Upper Body Bathing  Bathing Assistance: Set-up; Supervision  Position Performed: Seated edge of bed    Type of Bath: Chlorhexidine (CHG)    Lower Body Bathing  Lower Body : Set-up; Supervision (thighs)  Position Performed: Seated in chair    Upper [address]: Set-up        Pain:  controlled    Activity Tolerance:   Fair    After treatment patient left in no apparent distress:   Supine in bed, Call bell within reach, and Bed / chair alarm activated    COMMUNICATION/COLLABORATION:   The patients plan of care was discussed with: Physical therapist and Registered nurse.      Chucky Guerrero OT  Time Calculation: 32 mins

## 2022-09-30 NOTE — PROGRESS NOTES
[address] [facility] Adult  Hospitalist Group                                                                                          Hospitalist Progress Note  Ric Franco PA-C  Answering service: [phone] from in house phone        Date of Service:  2022  NAME:  Kandice Lorenz  :  1960  MRN:  [id number]      Admission Summary:   Kandice Lorenz is a 58 y.o. male with PMHx of ESRD on HD MWF, HTN, uncontrolled T2DM, and hypothyroidism who presented to the ED s/p L ankle injury and underwent L ankle ORIF with I&D of L ankle on 22 with Dr. Kody Nguyen. In regards to his ESRD, his LUE fistula was placed ~ 1 year ago but not usable due to tortuosity and now s/p AVF transposition on 22 with Vascular Surgeon, Dr. Carlota Alexander. Post-operative course c/b A-Fib with controlled rates for which cardiology consulted. Interval history / Subjective:   Mr. Cortez Sawyer reports feeling better today, more calm. He still notes no bowel movement but continues to pass significant flatus. Encouraged suppository to help moves bowels for which he agrees. Reviewed with RN who notes he has saldivar as retained urine earlier in hospitalization. Assessment & Plan:     Left ankle dislocation, left distal fibula fracture s/p mechanical fall  S/p Closed reduction in ED  ORIF on 20 with Dr. Kody Nguyen  Mechanical fall, \"legs buckled\"  -- Appreciate Orthopedic Surgery involvement. Signed off on 22. -- Per Ortho Sign off note:  - DVT ppx - Heparin daily in hospital; defer to medicine based on co-morbidities; needs coverage until follow up appointment; SCDs  - PT/OT - NWB LLE  - Pain - continue current Meds; Ice, Elevation  - Dressing - Leave in place if it remains dry and intact; change as needed for saturation with dry sterile island dressing  -- Fall: No syncopal symptoms. Work-up with CT Head  with no acute abnormality and carotid duplex with no stenosis.    -- Pain: Tylenol 1000mg BID standing with additional 650mg q4h PRN. Remove IV Morphine. Continue Oxycodone 5mg q4h PRN for breakthrough pain     ESRD on HD MWF   Anemia of CKD  Tortuosity of LUE fistula, s/p AVF transposition on 9/28/22 with Dr. Ricki Portillo  -- Nephrology following  -- Anemia: Can resume ADONIS at outpatient HD clinic  -- [address] Vascular Surgery involvement. Signed off 9/29. Will need follow-up in 2 weeks for staple removal.   -- Continues Lasix 80mg BID  -- Phos level mildly elevated 9/30. If level remains > 5.5 consider phos binder. Continue to trend. -- Diet: Low K and low Phos     Atrial Fibrillation  -- Onset on 9/28 after OR with vascular surgery  -- Cardiology consulted  -- Rate Control: Start Metoprolol Tartrate 25mg BID  -- TTE 9/29: LVEF of 0-55% and left atrium is mildly dilated. No significant valvular abnormalities  -- CHADS2 VASC score is 3. Defer to cardiology regarding anticoagulation for stroke prevention. They are currently recommending Warfarin to start on discharge     Panic attacks, anxiety   -- Felt due to abrupt discontinuation of chronic gabapentin and therefore was resumed at 300mg MWF dosing  -- Ongoing significant anxiety/fear around medical procedures and chronic conditions. Will benefit from long-term follow-up with counselor/psychiatry. Start Ativan 0.5mg BID PRN with hold parameters     Incidental large left thyroid nodule on Carotic Duplex 9/16  -- Needs to follow-up with PCP and completed dedicated imaging      Hypertension  -- Overall stable. Continues on home Losartan 50mg daily     DM2, a1c 12.8, uncontrolled  -- Continues on Lantus 40 units nightly, Lispro 10 units with meals, and ISS     Hypothyroidism  -- TSH 0.25 on 9/18/22. Team has been holding his Levothyroxine this admission. BPH  -- Continue home Flomax 0.8mg daily     Constipation  -- Daily Miralax and Pericolace. Add Lactulose PRN. Encouraged use.  Escalating to suppository on 9/30     Code status: Full Code  Prophylaxis: SQH. See Cardiology note  Care Plan discussed with: Pt,RN  Anticipated Disposition: TBD. PT/OT recommends IPR. Appreciate Care Coordination, reviewed note from 9/30     Hospital Problems  Date Reviewed: 9/28/2022            Codes Class Noted POA    Bimalleolar fracture of left ankle ICD-10-CM: S82.842A  ICD-9-CM: 824.4  9/17/2022 Unknown        Syncope ICD-10-CM: R55  ICD-9-CM: 780.2  9/16/2022 Unknown             Review of Systems:   A comprehensive review of systems was negative except for that written in the HPI. Vital Signs:    Last 24hrs VS reviewed since prior progress note. Most recent are:  Visit Vitals  BP (!) 144/67 (BP 1 Location: Right upper arm, BP Patient Position: At rest;Lying)   Pulse 78   Temp 98.5 °F (36.9 °C)   Resp 18   Ht 6' (1.829 m)   Wt 139.7 kg (308 lb)   SpO2 95%   BMI 41.77 kg/m²         Intake/Output Summary (Last 24 hours) at 9/30/2022 1247  Last data filed at 9/30/2022 0200  Gross per 24 hour   Intake --   Output 800 ml   Net -800 ml        Physical Examination:     I had a face to face encounter with this patient and independently examined them on 9/30/2022 as outlined below:    Constitutional:  Much more calm. Pleasant, cooperative   ENT:  Oral mucosa moist, oropharynx benign. Resp:  CTA bilaterally. No wheezing/rhonchi/rales. No accessory muscle use. No NC in place   CV:  Distant heart sounds. Pulse irregularly irregular. No appreciable murmurs    GI:  Soft, non distended, non tender. Musculoskeletal:  No edema, warm and well perfused. The LLE has a clean dressing/ACE wrap overlying    Neurologic:  Moves all extremities spontaneously.   AAOx3  : Holloway with clean/yellow urine          Data Review:    Review and/or order of clinical lab test  Review and/or order of tests in the radiology section of CPT  Review and/or order of tests in the medicine section of CPT      Labs:     Recent Labs     09/30/22  0100 09/29/22  0947   WBC 7.1 7.3   HGB 9.4* 9.5* HCT 28.1* 29.0*    225     Recent Labs     09/30/22  0100 09/29/22  0947   * 134*   K 4.1 3.8   CL 97 98   CO2 25 30   BUN 43* 33*   CREA 6.17* 5.23*   * 94   CA 9.9 10.4*   MG 2.1 2.1   PHOS 5.8*  --        Lab Results   Component Value Date/Time    Cholesterol, total 80 09/30/2022 01:00 AM    HDL Cholesterol 20 09/30/2022 01:00 AM    LDL, calculated 36.2 09/30/2022 01:00 AM    Triglyceride 119 09/30/2022 01:00 AM    CHOL/HDL Ratio 4.0 09/30/2022 01:00 AM     Lab Results   Component Value Date/Time    Glucose (POC) 165 (H) 09/30/2022 11:33 AM    Glucose (POC) 125 (H) 09/30/2022 06:06 AM    Glucose (POC) 122 (H) 09/29/2022 09:31 PM    Glucose (POC) 77 09/29/2022 05:06 PM    Glucose (POC) 190 (H) 09/29/2022 11:26 AM       Medications Reviewed:     Current Facility-Administered Medications   Medication Dose Route Frequency    epoetin rico-epbx (RETACRIT) injection 20,000 Units  20,000 Units SubCUTAneous Q MON, WED & FRI    LORazepam (ATIVAN) tablet 0.5 mg  0.5 mg Oral BID PRN    metoprolol tartrate (LOPRESSOR) tablet 25 mg  25 mg Oral Q12H    hydrALAZINE (APRESOLINE) 20 mg/mL injection 10 mg  10 mg IntraVENous Q4H PRN    heparin (porcine) injection 5,000 Units  5,000 Units SubCUTAneous Q8H    tamsulosin (FLOMAX) capsule 0.8 mg  0.8 mg Oral DAILY    polyethylene glycol (MIRALAX) packet 17 g  17 g Oral DAILY    bisacodyL (DULCOLAX) suppository 10 mg  10 mg Rectal DAILY PRN    losartan (COZAAR) tablet 50 mg  50 mg Oral DAILY    rosuvastatin (CRESTOR) tablet 10 mg  10 mg Oral QHS    furosemide (LASIX) tablet 80 mg  80 mg Oral BID    hydrOXYzine HCL (ATARAX) tablet  mg   mg Oral Q8H PRN    sodium chloride (NS) flush 5-40 mL  5-40 mL IntraVENous Q8H    sodium chloride (NS) flush 5-40 mL  5-40 mL IntraVENous PRN    oxyCODONE IR (ROXICODONE) tablet 5 mg  5 mg Oral Q4H PRN    naloxone (NARCAN) injection 0.4 mg  0.4 mg IntraVENous PRN    gabapentin (NEURONTIN) capsule 300 mg  300 mg Oral DIALYSIS MON, WED & FRI    insulin glargine (LANTUS) injection 40 Units  40 Units SubCUTAneous QHS    senna-docusate (PERICOLACE) 8.6-50 mg per tablet 1 Tablet  1 Tablet Oral DAILY    acetaminophen (TYLENOL) tablet 1,000 mg  1,000 mg Oral BID    insulin lispro (HUMALOG) injection 10 Units  10 Units SubCUTAneous TIDAC    insulin lispro (HUMALOG) injection   SubCUTAneous AC&HS    glucose chewable tablet 16 g  4 Tablet Oral PRN    glucagon (GLUCAGEN) injection 1 mg  1 mg IntraMUSCular PRN    dextrose 10 % infusion 0-250 mL  0-250 mL IntraVENous PRN    albumin human 25% (BUMINATE) solution 25 g  25 g IntraVENous DIALYSIS PRN    sodium chloride (NS) flush 5-40 mL  5-40 mL IntraVENous Q8H    sodium chloride (NS) flush 5-40 mL  5-40 mL IntraVENous PRN    ondansetron (ZOFRAN ODT) tablet 4 mg  4 mg Oral Q8H PRN    Or    ondansetron (ZOFRAN) injection 4 mg  4 mg IntraVENous Q6H PRN    acetaminophen (TYLENOL) tablet 650 mg  650 mg Oral Q4H PRN    Or    acetaminophen (TYLENOL) suppository 650 mg  650 mg Rectal Q6H PRN    heparin (porcine) 1,000 unit/mL injection 2,200 Units  2,200 Units InterCATHeter DIALYSIS PRN    And    heparin (porcine) 1,000 unit/mL injection 2,100 Units  2,100 Units InterCATHeter DIALYSIS PRN     ______________________________________________________________________  EXPECTED LENGTH OF STAY: 6d 4h  ACTUAL LENGTH OF STAY:          13                 Ric Franco PA-C

## 2022-09-30 NOTE — PROGRESS NOTES
Pt seen by Dr. Ashley Fleming. He does not recommend starting anticoagulation until discharge. So will not start IV heparin today. Recommends starting coumadin or eliquis at discharge but defer to renal as to their preference. Will reach out to Dr. Phani Boone.

## 2022-09-30 NOTE — PROGRESS NOTES
Physician Progress Note      PATIENT:               Jatin Muñiz  CSN #:                  [id number]  :                       1960  ADMIT DATE:       2022 9:32 AM  DISCH DATE:  RESPONDING  PROVIDER #:        CLAY LYONS PA-C          QUERY TEXT:    Patient admitted with ankle dislocation, noted to have atrial fibrillation. If possible, please document in progress notes and discharge summary further specificity regarding the type of atrial fibrillation: The medical record reflects the following:  Risk Factors: a fib    Clinical Indicators:  pn   Atrial Fibrillation  -- Onset on  after OR with vascular surgery  CHADS2 VASC score is 3. Defer to cardiology regarding anticoagulation for stroke prevention. Their note discusses IV Heparin gtt to start     card note   Afib: new diagnosis:  ? Uncertain time of onset, however he was not in afib on arrival to ER 22. No afib noted on recorded tele strips through 22. Pre-procedure anesthesia assessment did indicate RRR. He wasOff tele 22. He has no history of palpitations so asymptomatic.  -Heart rate is overall controlled (<110- 80's. ) with no rate control medications    ECHO:  Interpretation Summary  -  Left? Ventricle: Normal left ventricular systolic function with a visually estimated EF of 50 - 55%. Left ventricle size is normal. Mildly increased wall thickness. Normal wall motion.  -  Right? Ventricle: Not well visualized. Right ventricle size is normal.  -  Left? Atrium: Left atrium is mildly dilated. Treatment:  metoprolol 25 mg BID  heparin (porcine) injection 5,000 Units  card consult  ECHO      Thank you,  Margarette Grier RN [facility]  Clinical Documentation  [phone] or via [employer]    Chronic: nonspecific term that could be referring to paroxysmal, persistent, or permanent  Longstanding persistent: persistent and continuous, lasting > 1 year.   Paroxysmal - self-terminating or intermittent; resolves with or without intervention within 7 days of onset; may recur with various frequency. Persistent - Fails to terminate within 7 days; Often requires meds or cardioversion to restore to NSR. Permanent - longstanding & persistent; Medication has been ineffective in restoring NSR &/or cardioversion is contraindicated    Definitions per MS-DRG Training Guide and Quick Reference Guide, Makayla Meza 112 5 Diseases and Disorders of the Circulatory System; 2019; [employer]. Software content from the [employer]? Advanced CDI Transformation Program.  Options provided:  -- Paroxysmal Atrial Fibrillation  -- Longstanding Persistent Atrial Fibrillation  -- Permanent Atrial Fibrillation  -- Persistent Atrial Fibrillation  -- Chronic Atrial Fibrillation, unspecified  -- Other - I will add my own diagnosis  -- Disagree - Not applicable / Not valid  -- Disagree - Clinically unable to determine / Unknown  -- Refer to Clinical Documentation Reviewer    PROVIDER RESPONSE TEXT:    This patient has paroxysmal atrial fibrillation.     Query created by: Stefanie Josue on 9/30/2022 4:12 PM      Electronically signed by:  Jose Pitt PA-C 9/30/2022 4:27 PM No

## 2022-09-30 NOTE — PROGRESS NOTES
Problem: Mobility Impaired (Adult and Pediatric)  Goal: *Acute Goals and Plan of Care (Insert Text)  Description: Description: FUNCTIONAL STATUS PRIOR TO ADMISSION: Patient was modified independent using a rollator for functional mobility. HOME SUPPORT: The patient lived with spouse and required some assistance with self-care tasks. Physical Therapy Goals  Reviewed/ revised 9/29/2022  1. Patient will move from supine to sit and sit to supine  in bed with minimal assistance/contact guard assist within 7 day(s). 2.  Patient will transfer from bed to chair and chair to bed NWB L LE with moderate assistance using lateral technique and  least restrictive device within 7 day(s). 3.  Patient will perform sit to stand with moderate assistance NWB L LE within 7 day(s). 4.  Patient will perform HEP for LE strength with supervision within 7 day(s). Physical Therapy Goals  Initiated 9/22/2022  1. Patient will move from supine to sit and sit to supine  in bed with supervision/set-up within 7 day(s). 2.  Patient will transfer from bed to chair and chair to bed with minimal assistance/contact guard assist using the least restrictive device within 7 day(s). 3.  Patient will perform sit to stand with moderate assistance  within 7 day(s). 4.  Patient will perform BLE therex (as appropriate) for strengthening with supervision within 7 day(s). Outcome: Progressing Towards Goal     PHYSICAL THERAPY TREATMENT  Patient: Ani Smith (3 y.o. male)  Date: 9/30/2022  Diagnosis: Syncope [R55]  Bimalleolar fracture of left ankle [S82.842A] <principal problem not specified>  Procedure(s) (LRB):  LEFT UPPER ARM CEPHALIC VEIN TRANSPOSITION FISTULA (Left) 2 Days Post-Op  Precautions: NWB, Fall (LLE)  Chart, physical therapy assessment, plan of care and goals were reviewed. ASSESSMENT  Patient continues with skilled PT services and is progressing towards goals.  Pt tolerates supine to sitting with increased encouragement required for all mobility, some self limitation noted with initiation. Pt tolerates sitting well and hygiene with instruction, able to stand x2 and perform sidestep hopping attempts x2 with poor clearance and increased assist required. Pt tolerates return to supine with patient c/o severe pain and need to urinate. Notified RN as no attempts to adjust saldivar improve situation. Current Level of Function Impacting Discharge (mobility/balance): min A      Other factors to consider for discharge: pt self limiting minimally          PLAN :  Patient continues to benefit from skilled intervention to address the above impairments. Continue treatment per established plan of care. to address goals. Recommendation for discharge: (in order for the patient to meet his/her long term goals)  Therapy 3 hours per day 5-7 days per week    This discharge recommendation:  Has been made in collaboration with the attending provider and/or case management    IF patient discharges home will need the following DME: rolling walker       SUBJECTIVE:   Patient stated I feel like I need to pee but can't.     OBJECTIVE DATA SUMMARY:   Critical Behavior:  Neurologic State: Alert  Orientation Level: Oriented X4  Cognition: Follows commands  Safety/Judgement: Decreased insight into deficits  Functional Mobility Training:  Bed Mobility:  Rolling: Contact guard assistance;Minimum assistance  Supine to Sit: Contact guard assistance;Minimum assistance  Sit to Supine: Minimum assistance  Scooting: Contact guard assistance;Minimum assistance; Additional time        Transfers:  Sit to Stand: Moderate assistance;Assist x2  Stand to Sit: Moderate assistance;Assist x2                             Balance:  Sitting: Intact  Standing: Impaired; With support  Standing - Static: Poor;Fair;Constant support  Standing - Dynamic : Poor;Fair;Constant support  Ambulation/Gait Training:      Pain Rating:  controlled     Activity Tolerance:   Fair and requires rest breaks    After treatment patient left in no apparent distress:   Call bell within reach    COMMUNICATION/COLLABORATION:   The patients plan of care was discussed with: Physical therapist and Registered nurse.      Favian Tenorio, PT

## 2022-09-30 NOTE — PROGRESS NOTES
Nephrology Progress Note  Fercho Burk  Date of Admission : 9/16/2022    CC:  Follow up for ESRD       Assessment and Plan     ESRD on HD:  - MWF at [facility]  - Access: (R) permacath  - HD today  - s/p AVF transposition 9/28    Urinary retention   - continue flomax     HTN:  - cont current meds    Anemia of CKD:  - continue ADONIS    Secondary HPT    L distal fibula fracture:  - s/p ORIF 9/20    DM2  Obesity  Noncompliance       Interval History:  Seen and examined. No cp, sob, n/v/d. Getting bladder scan at the time of my visit . Current Medications: all current  Medications have been eviewed in EPIC  Review of Systems: Pertinent items are noted in HPI. Objective:  Vitals:    Vitals:    09/30/22 0544 09/30/22 0746 09/30/22 0800 09/30/22 1000   BP:  (!) 144/67     Pulse: 90 76 76 79   Resp:  18     Temp:  98.5 °F (36.9 °C)     SpO2:  95%     Weight:       Height:         Intake and Output:  No intake/output data recorded.   09/28 1901 - 09/30 0700  In: -   Out: 1050 [Urine:1050]    Physical Examination:    General: NAD,Conversant   Neck:  Supple, no mass  Resp:  Lungs CTA B/L, no wheezing , normal respiratory effort  CV:  RRR,  no murmur or rub, 2+ LE edema  GI:  Soft, NT, + Bowel sounds, no hepatosplenomegaly  Neurologic:  Non focal  Psych:             AAO x 3 appropriate affect  Skin:  No Rash- left foot splint dressing D/I  Access:           [facility]      Recent Labs     09/30/22 0100 09/29/22  0947   * 134*   K 4.1 3.8   CL 97 98   CO2 25 30   * 94   BUN 43* 33*   CREA 6.17* 5.23*   CA 9.9 10.4*   MG 2.1 2.1   PHOS 5.8*  --        Recent Labs     09/30/22 0100 09/29/22  0947   WBC 7.1 7.3   HGB 9.4* 9.5*   HCT 28.1* 29.0*    225       No results found for: SDES  No results found for: CULT  Recent Results (from the past 24 hour(s))   ECHO ADULT COMPLETE    Collection Time: 09/29/22  2:05 PM   Result Value Ref Range    IVSd 1.2 (A) 0.6 - 1.0 cm    LVIDd 6.4 (A) 4.2 - 5.9 cm    LVIDs 4.2 cm    LVOT Diameter 2.2 cm    LVPWd 1.4 (A) 0.6 - 1.0 cm    LVOT Peak Gradient 5 mmHg    LVOT Peak Velocity 1.1 m/s    RVIDd 4.9 cm    LA Diameter 5.6 cm    Est. RA Pressure 3 mmHg    MV E Velocity 1.32 m/s    LV E' Lateral Velocity 10 cm/s    LV E' Septal Velocity 6 cm/s    TAPSE 1.6 (A) 1.7 cm    Aortic Root 4.0 cm    Fractional Shortening 2D 34 28 - 44 %    LVIDd Index 2.50 cm/m2    LVIDs Index 1.64 cm/m2    LV RWT Ratio 0.44     LV Mass 2D 389.0 (A) 88 - 224 g    LV Mass 2D Index 152.0 (A) 49 - 115 g/m2    E/E' Ratio (Averaged) 17.60     E/E' Lateral 13.20     E/E' Septal 22.00     LVOT Area 3.8 cm2    LA Size Index 2.19 cm/m2    LA/AO Root Ratio 1.40     Ao Root Index 1.56 cm/m2   GLUCOSE, POC    Collection Time: 09/29/22  5:06 PM   Result Value Ref Range    Glucose (POC) 77 65 - 117 mg/dL    Performed by Yazmin Porras RN    GLUCOSE, POC    Collection Time: 09/29/22  9:31 PM   Result Value Ref Range    Glucose (POC) 122 (H) 65 - 117 mg/dL    Performed by [facility]    LIPID PANEL    Collection Time: 09/30/22  1:00 AM   Result Value Ref Range    Cholesterol, total 80 <200 MG/DL    Triglyceride 119 <150 MG/DL    HDL Cholesterol 20 MG/DL    LDL, calculated 36.2 0 - 100 MG/DL    VLDL, calculated 23.8 MG/DL    CHOL/HDL Ratio 4.0 0.0 - 5.0     CBC WITH AUTOMATED DIFF    Collection Time: 09/30/22  1:00 AM   Result Value Ref Range    WBC 7.1 4.1 - 11.1 K/uL    RBC 3.01 (L) 4.10 - 5.70 M/uL    HGB 9.4 (L) 12.1 - 17.0 g/dL    HCT 28.1 (L) 36.6 - 50.3 %    MCV 93.4 80.0 - 99.0 FL    MCH 31.2 26.0 - 34.0 PG    MCHC 33.5 30.0 - 36.5 g/dL    RDW 12.2 11.5 - 14.5 %    PLATELET 242 498 - 508 K/uL    MPV 9.4 8.9 - 12.9 FL    NRBC 0.0 0  WBC    ABSOLUTE NRBC 0.00 0.00 - 0.01 K/uL    NEUTROPHILS 74 32 - 75 %    LYMPHOCYTES 16 12 - 49 %    MONOCYTES 8 5 - 13 %    EOSINOPHILS 2 0 - 7 %    BASOPHILS 0 0 - 1 %    IMMATURE GRANULOCYTES 0 0.0 - 0.5 %    ABS. NEUTROPHILS 5.2 1.8 - 8.0 K/UL    ABS.  LYMPHOCYTES 1.2 0.8 - 3.5 K/UL    ABS. MONOCYTES 0.6 0.0 - 1.0 K/UL    ABS. EOSINOPHILS 0.1 0.0 - 0.4 K/UL    ABS. BASOPHILS 0.0 0.0 - 0.1 K/UL    ABS. IMM. GRANS. 0.0 0.00 - 0.04 K/UL    DF AUTOMATED     METABOLIC PANEL, BASIC    Collection Time: 09/30/22  1:00 AM   Result Value Ref Range    Sodium 132 (L) 136 - 145 mmol/L    Potassium 4.1 3.5 - 5.1 mmol/L    Chloride 97 97 - 108 mmol/L    CO2 25 21 - 32 mmol/L    Anion gap 10 5 - 15 mmol/L    Glucose 131 (H) 65 - 100 mg/dL    BUN 43 (H) 6 - 20 MG/DL    Creatinine 6.17 (H) 0.70 - 1.30 MG/DL    BUN/Creatinine ratio 7 (L) 12 - 20      GFR est AA 11 (L) >60 ml/min/1.73m2    GFR est non-AA 9 (L) >60 ml/min/1.73m2    Calcium 9.9 8.5 - 10.1 MG/DL   MAGNESIUM    Collection Time: 09/30/22  1:00 AM   Result Value Ref Range    Magnesium 2.1 1.6 - 2.4 mg/dL   PHOSPHORUS    Collection Time: 09/30/22  1:00 AM   Result Value Ref Range    Phosphorus 5.8 (H) 2.6 - 4.7 MG/DL   GLUCOSE, POC    Collection Time: 09/30/22  6:06 AM   Result Value Ref Range    Glucose (POC) 125 (H) 65 - 117 mg/dL    Performed by Nimo Gillises Est, POC    Collection Time: 09/30/22 11:33 AM   Result Value Ref Range    Glucose (POC) 165 (H) 65 - 117 mg/dL    Performed by Addi MATOS      The above assessment and plan reviewed with Dr. Renetta Michel. Jessie Justin MD  [address]  Phone - [phone]   Fax - [phone]  [email or web address]

## 2022-09-30 NOTE — PROGRESS NOTES
Transition of Care Plan  RUR- 15%   DISPOSITION: The disposition plan is SNF- [address],  and Rehab  SNF Acceptance: [facility] Received;   Call Report: [phone]/ #[phone]  Per [facility] admissions liaison [phone]; they can take the pt tomorrow. Packet Completed on Chart   OP Dialysis: MWF at 12:30pm [address]  F/U with PCP/Specialist    Transport: [facility] 12pm 10/1 Requested    Medicare pt has received, reviewed, and signed 2nd IM letter informing them of their right to appeal the discharge. Signed copy has been placed on pt bedside chart.       CM: [address] [place],   [phone]

## 2022-10-01 ENCOUNTER — APPOINTMENT (OUTPATIENT)
Dept: GENERAL RADIOLOGY | Age: 62
DRG: 492 | End: 2022-10-01
Attending: NURSE PRACTITIONER
Payer: MEDICARE

## 2022-10-01 ENCOUNTER — APPOINTMENT (OUTPATIENT)
Dept: CT IMAGING | Age: 62
DRG: 492 | End: 2022-10-01
Attending: NURSE PRACTITIONER
Payer: MEDICARE

## 2022-10-01 LAB
ANION GAP SERPL CALC-SCNC: 7 MMOL/L (ref 5–15)
ATRIAL RATE: 234 BPM
BASOPHILS # BLD: 0 K/UL (ref 0–0.1)
BASOPHILS NFR BLD: 1 % (ref 0–1)
BUN SERPL-MCNC: 24 MG/DL (ref 6–20)
BUN/CREAT SERPL: 6 (ref 12–20)
CALCIUM SERPL-MCNC: 10.4 MG/DL (ref 8.5–10.1)
CALCULATED R AXIS, ECG10: -69 DEGREES
CALCULATED T AXIS, ECG11: 100 DEGREES
CHLORIDE SERPL-SCNC: 102 MMOL/L (ref 97–108)
CO2 SERPL-SCNC: 26 MMOL/L (ref 21–32)
CREAT SERPL-MCNC: 3.78 MG/DL (ref 0.7–1.3)
DIAGNOSIS, 93000: NORMAL
DIFFERENTIAL METHOD BLD: ABNORMAL
EOSINOPHIL # BLD: 0.1 K/UL (ref 0–0.4)
EOSINOPHIL NFR BLD: 2 % (ref 0–7)
ERYTHROCYTE [DISTWIDTH] IN BLOOD BY AUTOMATED COUNT: 12.4 % (ref 11.5–14.5)
GLUCOSE BLD STRIP.AUTO-MCNC: 132 MG/DL (ref 65–117)
GLUCOSE BLD STRIP.AUTO-MCNC: 148 MG/DL (ref 65–117)
GLUCOSE BLD STRIP.AUTO-MCNC: 150 MG/DL (ref 65–117)
GLUCOSE BLD STRIP.AUTO-MCNC: 158 MG/DL (ref 65–117)
GLUCOSE SERPL-MCNC: 125 MG/DL (ref 65–100)
HCT VFR BLD AUTO: 30.1 % (ref 36.6–50.3)
HGB BLD-MCNC: 10.2 G/DL (ref 12.1–17)
IMM GRANULOCYTES # BLD AUTO: 0 K/UL (ref 0–0.04)
IMM GRANULOCYTES NFR BLD AUTO: 1 % (ref 0–0.5)
LYMPHOCYTES # BLD: 1 K/UL (ref 0.8–3.5)
LYMPHOCYTES NFR BLD: 16 % (ref 12–49)
MAGNESIUM SERPL-MCNC: 2 MG/DL (ref 1.6–2.4)
MCH RBC QN AUTO: 31.6 PG (ref 26–34)
MCHC RBC AUTO-ENTMCNC: 33.9 G/DL (ref 30–36.5)
MCV RBC AUTO: 93.2 FL (ref 80–99)
MONOCYTES # BLD: 0.4 K/UL (ref 0–1)
MONOCYTES NFR BLD: 7 % (ref 5–13)
NEUTS SEG # BLD: 4.7 K/UL (ref 1.8–8)
NEUTS SEG NFR BLD: 73 % (ref 32–75)
NRBC # BLD: 0 K/UL (ref 0–0.01)
NRBC BLD-RTO: 0 PER 100 WBC
PHOSPHATE SERPL-MCNC: 2.9 MG/DL (ref 2.6–4.7)
PLATELET # BLD AUTO: 220 K/UL (ref 150–400)
PMV BLD AUTO: 9.6 FL (ref 8.9–12.9)
POTASSIUM SERPL-SCNC: 4.2 MMOL/L (ref 3.5–5.1)
Q-T INTERVAL, ECG07: 282 MS
QRS DURATION, ECG06: 166 MS
QTC CALCULATION (BEZET), ECG08: 504 MS
RBC # BLD AUTO: 3.23 M/UL (ref 4.1–5.7)
SERVICE CMNT-IMP: ABNORMAL
SODIUM SERPL-SCNC: 135 MMOL/L (ref 136–145)
VENTRICULAR RATE, ECG03: 192 BPM
WBC # BLD AUTO: 6.3 K/UL (ref 4.1–11.1)

## 2022-10-01 PROCEDURE — 36415 COLL VENOUS BLD VENIPUNCTURE: CPT

## 2022-10-01 PROCEDURE — 74011636637 HC RX REV CODE- 636/637

## 2022-10-01 PROCEDURE — 82962 GLUCOSE BLOOD TEST: CPT

## 2022-10-01 PROCEDURE — 93005 ELECTROCARDIOGRAM TRACING: CPT

## 2022-10-01 PROCEDURE — 65270000046 HC RM TELEMETRY

## 2022-10-01 PROCEDURE — 84100 ASSAY OF PHOSPHORUS: CPT

## 2022-10-01 PROCEDURE — 99233 SBSQ HOSP IP/OBS HIGH 50: CPT | Performed by: INTERNAL MEDICINE

## 2022-10-01 PROCEDURE — 74011250637 HC RX REV CODE- 250/637: Performed by: NURSE PRACTITIONER

## 2022-10-01 PROCEDURE — 85025 COMPLETE CBC W/AUTO DIFF WBC: CPT

## 2022-10-01 PROCEDURE — 74011000250 HC RX REV CODE- 250

## 2022-10-01 PROCEDURE — 74011250637 HC RX REV CODE- 250/637

## 2022-10-01 PROCEDURE — 80048 BASIC METABOLIC PNL TOTAL CA: CPT

## 2022-10-01 PROCEDURE — 74011250636 HC RX REV CODE- 250/636

## 2022-10-01 PROCEDURE — 74177 CT ABD & PELVIS W/CONTRAST: CPT

## 2022-10-01 PROCEDURE — 74018 RADEX ABDOMEN 1 VIEW: CPT

## 2022-10-01 PROCEDURE — 83735 ASSAY OF MAGNESIUM: CPT

## 2022-10-01 PROCEDURE — 74011250636 HC RX REV CODE- 250/636: Performed by: INTERNAL MEDICINE

## 2022-10-01 PROCEDURE — 74011000636 HC RX REV CODE- 636: Performed by: STUDENT IN AN ORGANIZED HEALTH CARE EDUCATION/TRAINING PROGRAM

## 2022-10-01 RX ORDER — SENNOSIDES 8.6 MG/1
2 TABLET ORAL DAILY
Status: DISCONTINUED | OUTPATIENT
Start: 2022-10-01 | End: 2022-10-03 | Stop reason: HOSPADM

## 2022-10-01 RX ADMIN — BISACODYL 1 ENEMA: 10 ENEMA RECTAL at 12:36

## 2022-10-01 RX ADMIN — FUROSEMIDE 80 MG: 40 TABLET ORAL at 17:20

## 2022-10-01 RX ADMIN — METOPROLOL TARTRATE 25 MG: 25 TABLET, FILM COATED ORAL at 20:57

## 2022-10-01 RX ADMIN — SODIUM CHLORIDE, PRESERVATIVE FREE 10 ML: 5 INJECTION INTRAVENOUS at 02:47

## 2022-10-01 RX ADMIN — IOHEXOL 50 ML: 240 INJECTION, SOLUTION INTRATHECAL; INTRAVASCULAR; INTRAVENOUS; ORAL at 15:18

## 2022-10-01 RX ADMIN — INSULIN GLARGINE 40 UNITS: 100 INJECTION, SOLUTION SUBCUTANEOUS at 22:18

## 2022-10-01 RX ADMIN — BISACODYL 10 MG: 10 SUPPOSITORY RECTAL at 06:02

## 2022-10-01 RX ADMIN — SODIUM CHLORIDE, PRESERVATIVE FREE 10 ML: 5 INJECTION INTRAVENOUS at 22:00

## 2022-10-01 RX ADMIN — HEPARIN SODIUM 5000 UNITS: 5000 INJECTION INTRAVENOUS; SUBCUTANEOUS at 16:04

## 2022-10-01 RX ADMIN — ROSUVASTATIN CALCIUM 10 MG: 10 TABLET, FILM COATED ORAL at 22:17

## 2022-10-01 RX ADMIN — HEPARIN SODIUM 2200 UNITS: 1000 INJECTION INTRAVENOUS; SUBCUTANEOUS at 02:19

## 2022-10-01 RX ADMIN — LOSARTAN POTASSIUM 50 MG: 50 TABLET, FILM COATED ORAL at 10:44

## 2022-10-01 RX ADMIN — HEPARIN SODIUM 2100 UNITS: 1000 INJECTION INTRAVENOUS; SUBCUTANEOUS at 02:19

## 2022-10-01 RX ADMIN — HEPARIN SODIUM 5000 UNITS: 5000 INJECTION INTRAVENOUS; SUBCUTANEOUS at 07:25

## 2022-10-01 RX ADMIN — ACETAMINOPHEN 1000 MG: 500 TABLET, FILM COATED ORAL at 10:43

## 2022-10-01 RX ADMIN — POLYETHYLENE GLYCOL 3350 17 G: 17 POWDER, FOR SOLUTION ORAL at 10:43

## 2022-10-01 RX ADMIN — SODIUM CHLORIDE, PRESERVATIVE FREE 10 ML: 5 INJECTION INTRAVENOUS at 16:05

## 2022-10-01 RX ADMIN — METOPROLOL TARTRATE 25 MG: 25 TABLET, FILM COATED ORAL at 10:44

## 2022-10-01 RX ADMIN — HEPARIN SODIUM 5000 UNITS: 5000 INJECTION INTRAVENOUS; SUBCUTANEOUS at 22:17

## 2022-10-01 RX ADMIN — SODIUM CHLORIDE, PRESERVATIVE FREE 10 ML: 5 INJECTION INTRAVENOUS at 07:28

## 2022-10-01 RX ADMIN — FUROSEMIDE 80 MG: 40 TABLET ORAL at 10:44

## 2022-10-01 RX ADMIN — OXYCODONE 5 MG: 5 TABLET ORAL at 02:43

## 2022-10-01 RX ADMIN — IOPAMIDOL 100 ML: 755 INJECTION, SOLUTION INTRAVENOUS at 15:18

## 2022-10-01 RX ADMIN — ACETAMINOPHEN 1000 MG: 500 TABLET, FILM COATED ORAL at 17:20

## 2022-10-01 RX ADMIN — SENNOSIDES 17.2 MG: 8.6 TABLET, FILM COATED ORAL at 10:43

## 2022-10-01 RX ADMIN — DOCUSATE SODIUM 50MG AND SENNOSIDES 8.6MG 1 TABLET: 8.6; 5 TABLET, FILM COATED ORAL at 10:44

## 2022-10-01 RX ADMIN — TAMSULOSIN HYDROCHLORIDE 0.8 MG: 0.4 CAPSULE ORAL at 10:43

## 2022-10-01 RX ADMIN — EPOETIN ALFA-EPBX 20000 UNITS: 10000 INJECTION, SOLUTION INTRAVENOUS; SUBCUTANEOUS at 02:49

## 2022-10-01 NOTE — CONSULTS
[address]  [phone]        GASTROENTEROLOGY  CONSULTATION NOTE      NAME:  Marium Ayers   :   1960   MRN:   [id number]     Referring Physician: Zoraida Kerns MD     Date of Admission: 2022  Consult Date: 10/1/2022     Chief Complaint:   Chief Complaint   Patient presents with    Ankle Injury       Reason for Consult: Severe constipation    Assessment:   Severe acute constipation: Likely secondary to narcotics. Significant stool in rectum on imaging. Current regimen of enema and supp appears to be working. May also benefit from methylnaltrexone. Does not have prior h/o constipation. Reports colonoscopy about 5 yrs back. For now will work on bowel regimen and should have outpatient follow up to consider colonoscopy. Fibular fracture: s/p ORIF 0920, getting oxycodone  Co-morbidities: Obesity, DM2, ESRD on HD, CHF, anemia of CKD         Recommendations:   Minimize narcotics  Continue current bowel regimen  Consider adding oral miralax/dulcolax in am if responding  Consider SQ methylnaltrexone 12 mg daily if still inadequate response by tomorrow       Thank you for allowing us to participate in the care of this patient. Please do not hesitate to contact us with any further questions/concerns. Cedric Florian MD  [facility]      ------------------------------------------------------------------------------------------------------------------    History of Present Illness:  Patient is a 58 y.o. with past medical history significant for Obesity, DM2, ESRD on HD, CHF, anemia of CKD who presented with ankle fracture and we were consulted for above reason. History obtained from chart review, patient    Patient was admitted for ankle fracture and underwent surgery . Reports developing constipation since admission and did not have prior issues. Denies any blood in stools.  Reports trying to strain a lot without success and almost fainted trying to do so. Imaging showed large amount of stool in rectum. Nurse disimpacted some and started on enemas and dulcolax supp. Today reports passing some gas and stool and feels slightly better. Noted has been getting oxycodone daily. Was not on any narcotics at home. Recalls having colonoscopy about 5 yrs back. Does not recall results. Review of Systems:  A detailed 10 system ROS is obtained, with pertinent positives as listed in the HPI and PMH. All others are negative. Objective:   BP (!) 164/51 (BP 1 Location: Right upper arm, BP Patient Position: At rest)   Pulse 90   Temp 97.6 °F (36.4 °C)   Resp 18   Ht 6' (1.829 m)   Wt 139.7 kg (308 lb)   SpO2 98%   BMI 41.77 kg/m²     Physical Exam    General: Alert, cooperative, no acute distress    HEENT: Atraumatic. PERRLA, EOMI. Anicteric sclerae. Lungs:  Comfortable breathing. No obvious use of accessory muscles. Not on oxygen  Abdomen: Soft, obese, Non distended, Non tender. Positive Bowel sounds, no hepatosplenomegaly  Extremities: Left lower extremity in dressing  Neurologic:  CN 2-12 grossly intact, Alert and oriented X 3. No acute neurological distress   Psych:    Good insight. Not anxious nor agitated. Data Review      10/2/22 00:35   Sodium 132 (L)   Potassium 4.1   Chloride 100   CO2 25   Anion gap 7   Glucose 156 (H)   BUN 44 (H)   Creatinine 5.76 (H)   BUN/Creatinine ratio 8 (L)   Calcium 9.9   Phosphorus 5.9 (H)   Magnesium 2.2   GFR est non-AA 10 (L)   GFR est AA 12 (L)      10/2/22 00:35   WBC 7.2   NRBC 0.0   RBC 3.14 (L)   HGB 9.7 (L)   HCT 28.5 (L)   MCV 90.8   MCH 30.9   MCHC 34.0   RDW 12.3   PLATELET 765      0/19/02 01:23   TSH 0.25 (L)      9/29/22 09:47   T4, Free 1.0     Imaging studies: Reviewed    CT abd/pelvis with contrast:    SMALL BOWEL: No dilatation or wall thickening. COLON: No dilatation or wall thickening. Extensive stool burden.     Past Medical History:   Diagnosis Date    Anemia Chronic kidney disease     Diabetes ([facility] [address]     Dorsalgia     ESRD on hemodialysis ([facility] [address]     Gastro-esophageal reflux     Heart failure (HCC)     Hypertension     Obesity     Polyneuropathy     Sciatica        History reviewed. No pertinent surgical history. Allergies: Allergies   Allergen Reactions    Adhesive Rash     Paper tape ok    Bactrim [Sulfamethoprim] Rash       Medications Prior to Admission   Medication Sig    furosemide (LASIX) 40 mg tablet Take 80 mg by mouth two (2) times a day.    gabapentin (NEURONTIN) 600 mg tablet Take 600 mg by mouth two (2) times a day.    hydrOXYzine HCL (ATARAX) 25 mg tablet Take  mg by mouth every eight (8) hours as needed for Anxiety or Sleep. insulin aspart U-100 (NOVOLOG) 100 unit/mL (3 mL) inpn 10 Units by SubCUTAneous route Before breakfast, lunch, and dinner. insulin CONCENTRATED regular (HumuLIN R U-500) 500 unit/mL soln 175 Units by SubCUTAneous route Every morning. insulin CONCENTRATED regular (HumuLIN R U-500) 500 unit/mL soln 80 Units by SubCUTAneous route every evening. levothyroxine (SYNTHROID) 25 mcg tablet Take 25 mcg by mouth Daily (before breakfast). losartan (COZAAR) 100 mg tablet Take 100 mg by mouth daily. rosuvastatin (CRESTOR) 20 mg tablet Take 20 mg by mouth nightly.        Current Facility-Administered Medications   Medication Dose Route Frequency    senna (SENOKOT) tablet 17.2 mg  2 Tablet Oral DAILY    epoetin rico-epbx (RETACRIT) injection 20,000 Units  20,000 Units SubCUTAneous Q MON, WED & FRI    LORazepam (ATIVAN) tablet 0.5 mg  0.5 mg Oral BID PRN    metoprolol tartrate (LOPRESSOR) tablet 25 mg  25 mg Oral Q12H    hydrALAZINE (APRESOLINE) 20 mg/mL injection 10 mg  10 mg IntraVENous Q4H PRN    heparin (porcine) injection 5,000 Units  5,000 Units SubCUTAneous Q8H    tamsulosin (FLOMAX) capsule 0.8 mg  0.8 mg Oral DAILY    polyethylene glycol (MIRALAX) packet 17 g  17 g Oral DAILY    bisacodyL (DULCOLAX) suppository 10 mg  10 mg Rectal DAILY PRN    losartan (COZAAR) tablet 50 mg  50 mg Oral DAILY    rosuvastatin (CRESTOR) tablet 10 mg  10 mg Oral QHS    furosemide (LASIX) tablet 80 mg  80 mg Oral BID    hydrOXYzine HCL (ATARAX) tablet  mg   mg Oral Q8H PRN    sodium chloride (NS) flush 5-40 mL  5-40 mL IntraVENous Q8H    sodium chloride (NS) flush 5-40 mL  5-40 mL IntraVENous PRN    oxyCODONE IR (ROXICODONE) tablet 5 mg  5 mg Oral Q4H PRN    naloxone (NARCAN) injection 0.4 mg  0.4 mg IntraVENous PRN    gabapentin (NEURONTIN) capsule 300 mg  300 mg Oral DIALYSIS MON, WED & FRI    insulin glargine (LANTUS) injection 40 Units  40 Units SubCUTAneous QHS    senna-docusate (PERICOLACE) 8.6-50 mg per tablet 1 Tablet  1 Tablet Oral DAILY    acetaminophen (TYLENOL) tablet 1,000 mg  1,000 mg Oral BID    insulin lispro (HUMALOG) injection 10 Units  10 Units SubCUTAneous TIDAC    insulin lispro (HUMALOG) injection   SubCUTAneous AC&HS    glucose chewable tablet 16 g  4 Tablet Oral PRN    glucagon (GLUCAGEN) injection 1 mg  1 mg IntraMUSCular PRN    dextrose 10 % infusion 0-250 mL  0-250 mL IntraVENous PRN    albumin human 25% (BUMINATE) solution 25 g  25 g IntraVENous DIALYSIS PRN    sodium chloride (NS) flush 5-40 mL  5-40 mL IntraVENous Q8H    sodium chloride (NS) flush 5-40 mL  5-40 mL IntraVENous PRN    ondansetron (ZOFRAN ODT) tablet 4 mg  4 mg Oral Q8H PRN    Or    ondansetron (ZOFRAN) injection 4 mg  4 mg IntraVENous Q6H PRN    acetaminophen (TYLENOL) tablet 650 mg  650 mg Oral Q4H PRN    Or    acetaminophen (TYLENOL) suppository 650 mg  650 mg Rectal Q6H PRN    heparin (porcine) 1,000 unit/mL injection 2,200 Units  2,200 Units InterCATHeter DIALYSIS PRN    And    heparin (porcine) 1,000 unit/mL injection 2,100 Units  2,100 Units InterCATHeter DIALYSIS PRN       Social History     Tobacco Use    Smoking status: Every Day     Packs/day: 0.50     Types: Cigarettes    Smokeless tobacco: Never   Substance Use Topics    Alcohol use: Not on file       Family History:  History reviewed. No pertinent family history.

## 2022-10-01 NOTE — PROGRESS NOTES
Justina Gonzales NP notified and aware that patient's blood sugar was 109 at bedtime. Patient has a decreased appetite throughout the day. Patent had Lantus 40 units ordered for bedtime. RN held the Lantus dose. Justina Gonzales NP stated that was fine to hold the Lantus dose.

## 2022-10-01 NOTE — PROGRESS NOTES
TRANSITION OF CARE  RUR--13%  Disposition--To [facility]--[facility] with [facility]. PCS on chart. Changed to Will Call 10/1/22. Patient's primary family contact: spouse Noe Latham no contact number. CM was advised by the hospitalist that patient is not medically stable for discharge. CM notified [facility] that patient will not be discharged today. CM changed [facility] status to [facility]. CM gave update to staff nurse.

## 2022-10-01 NOTE — PROGRESS NOTES
Nephrology Progress Note  Fercho Burk  Date of Admission : 9/16/2022    CC:  Follow up for ESRD       Assessment and Plan     ESRD on HD:  - MWF at [facility]  - Access: (R) permacath  -Next HD Monday  - s/p AVF transposition 9/28    Urinary retention   -Holloway catheter inserted 9/30  -On Flomax    Constipation  -KUB ordered this morning    HTN:  - cont current meds    Anemia of CKD:  - continue ADONIS    Secondary HPT    L distal fibula fracture:  - s/p ORIF 9/20    DM2  Obesity  Noncompliance       Interval History:  Seen and examined. He had rapid response just prior to my visit. He reportedly had rectal pain and abdominal pain and tachycardia. He has been constipated for several days. Tolerated dialysis last night    Current Medications: all current  Medications have been eviewed in EPIC  Review of Systems: Pertinent items are noted in HPI.     Objective:  Vitals:    Vitals:    10/01/22 0347 10/01/22 0543 10/01/22 0549 10/01/22 0741   BP:   (!) 108/59 (!) 163/94   Pulse: 76 87 74 88   Resp:   22 24   Temp:   97.7 °F (36.5 °C) 97.7 °F (36.5 °C)   TempSrc:       SpO2:   93% 97%   Weight:       Height:         Intake and Output:  10/01 0701 - 10/01 1900  In: -   Out: 125 [Urine:125]  09/29 1901 - 10/01 0700  In: -   Out: 4600 [Urine:600]    Physical Examination:    General: NAD,Conversant   Neck:  Supple, no mass  Resp:  Lungs CTA B/L, no wheezing , normal respiratory effort  CV:  RRR,  no murmur or rub, 2+ LE edema  GI:  Soft, NT, + Bowel sounds, no hepatosplenomegaly  Neurologic:  Non focal  Psych:             AAO x 3 appropriate affect  Skin:  No Rash- left foot splint dressing D/I  Access:           MARTIR BATES c/d/i      Recent Labs     10/01/22  0346 09/30/22  0100 09/29/22  0947   * 132* 134*   K 4.2 4.1 3.8    97 98   CO2 26 25 30   * 131* 94   BUN 24* 43* 33*   CREA 3.78* 6.17* 5.23*   CA 10.4* 9.9 10.4*   MG 2.0 2.1 2.1   PHOS 2.9 5.8*  --        Recent Labs     10/01/22  0344 09/30/22  0100 09/29/22  0947   WBC 6.3 7.1 7.3   HGB 10.2* 9.4* 9.5*   HCT 30.1* 28.1* 29.0*    211 225       No results found for: SDES  No results found for: CULT  Recent Results (from the past 24 hour(s))   GLUCOSE, POC    Collection Time: 09/30/22 11:33 AM   Result Value Ref Range    Glucose (POC) 165 (H) 65 - 117 mg/dL    Performed by Jimy MATOS    GLUCOSE, POC    Collection Time: 09/30/22  4:06 PM   Result Value Ref Range    Glucose (POC) 84 65 - 117 mg/dL    Performed by Kassy Mallory    GLUCOSE, POC    Collection Time: 09/30/22 10:05 PM   Result Value Ref Range    Glucose (POC) 109 65 - 117 mg/dL    Performed by ILDEFONSO PELLETIER    METABOLIC PANEL, BASIC    Collection Time: 10/01/22  3:46 AM   Result Value Ref Range    Sodium 135 (L) 136 - 145 mmol/L    Potassium 4.2 3.5 - 5.1 mmol/L    Chloride 102 97 - 108 mmol/L    CO2 26 21 - 32 mmol/L    Anion gap 7 5 - 15 mmol/L    Glucose 125 (H) 65 - 100 mg/dL    BUN 24 (H) 6 - 20 MG/DL    Creatinine 3.78 (H) 0.70 - 1.30 MG/DL    BUN/Creatinine ratio 6 (L) 12 - 20      GFR est AA 20 (L) >60 ml/min/1.73m2    GFR est non-AA 16 (L) >60 ml/min/1.73m2    Calcium 10.4 (H) 8.5 - 10.1 MG/DL   MAGNESIUM    Collection Time: 10/01/22  3:46 AM   Result Value Ref Range    Magnesium 2.0 1.6 - 2.4 mg/dL   CBC WITH AUTOMATED DIFF    Collection Time: 10/01/22  3:46 AM   Result Value Ref Range    WBC 6.3 4.1 - 11.1 K/uL    RBC 3.23 (L) 4.10 - 5.70 M/uL    HGB 10.2 (L) 12.1 - 17.0 g/dL    HCT 30.1 (L) 36.6 - 50.3 %    MCV 93.2 80.0 - 99.0 FL    MCH 31.6 26.0 - 34.0 PG    MCHC 33.9 30.0 - 36.5 g/dL    RDW 12.4 11.5 - 14.5 %    PLATELET 133 101 - 037 K/uL    MPV 9.6 8.9 - 12.9 FL    NRBC 0.0 0  WBC    ABSOLUTE NRBC 0.00 0.00 - 0.01 K/uL    NEUTROPHILS 73 32 - 75 %    LYMPHOCYTES 16 12 - 49 %    MONOCYTES 7 5 - 13 %    EOSINOPHILS 2 0 - 7 %    BASOPHILS 1 0 - 1 %    IMMATURE GRANULOCYTES 1 (H) 0.0 - 0.5 %    ABS. NEUTROPHILS 4.7 1.8 - 8.0 K/UL    ABS.  LYMPHOCYTES 1.0 0.8 - 3.5 K/UL    ABS. MONOCYTES 0.4 0.0 - 1.0 K/UL    ABS. EOSINOPHILS 0.1 0.0 - 0.4 K/UL    ABS. BASOPHILS 0.0 0.0 - 0.1 K/UL    ABS. IMM. GRANS. 0.0 0.00 - 0.04 K/UL    DF AUTOMATED     PHOSPHORUS    Collection Time: 10/01/22  3:46 AM   Result Value Ref Range    Phosphorus 2.9 2.6 - 4.7 MG/DL   GLUCOSE, POC    Collection Time: 10/01/22  7:24 AM   Result Value Ref Range    Glucose (POC) 132 (H) 65 - 117 mg/dL    Performed by ILDEFONSO PELLETIER    EKG, 12 LEAD, INITIAL    Collection Time: 10/01/22  7:57 AM   Result Value Ref Range    Ventricular Rate 192 BPM    Atrial Rate 234 BPM    QRS Duration 166 ms    Q-T Interval 282 ms    QTC Calculation (Bezet) 504 ms    Calculated R Axis -69 degrees    Calculated T Axis 100 degrees    Diagnosis       Atrial fibrillation with rapid ventricular response with premature   ventricular or aberrantly conducted complexes  Right bundle branch block  Left anterior fascicular block  ** Bifascicular block **  Left ventricular hypertrophy with QRS widening  T wave abnormality, consider inferolateral ischemia or digitalis effect  When compared with ECG of 28-SEP-2022 17:21,  Vent. rate has increased BY  85 BPM       The above assessment and plan reviewed with Dr. Aleksandra Hein. Riki Fajardo MD  [address]  Phone - [phone]   Fax - [phone]  [email or web address]

## 2022-10-01 NOTE — PROGRESS NOTES
Patient very uncomfortable due to constipation, abdominal cramping, and rectal pressure. RN offered a dulcolax suppository approximately 02:30 a.m. Patient refused suppository. At 05:50, patient remained uncomfortable and agreed to receive a dulcolax suppository. At 07:20, patient still had no results. RN attempted manually  to disimpact patient. Patient had a vagal response. Patient's heart rate increased to 140. Patient had runs of V-tach. Rapid response called at 07:39. Supervisor & critical care RN responded. Vital signs rechecked and were stable. EKG ordered. Rapid response ended at 08:05.

## 2022-10-01 NOTE — PROCEDURES
Hemodialysis / [phone]    Vitals Pre Post Assessment Pre Post   BP BP: 136/62 (09/30/22 2202) 123/62 LOC Alert and oriented 4 Alert and oriented x 4   HR Pulse (Heart Rate): 72 (09/30/22 2202) 76 Lungs Diminished  Diminished    Resp Resp Rate: 18 (09/30/22 2202) 18 Cardiac NSR  NSR    Temp Temp: 97.9 °F (36.6 °C) (09/30/22 2202) 98 Skin Warm and dry  Warm and dry    Weight Pre-Dialysis Weight: 135.3 kg (298 lb 4.5 oz) (09/23/22 0820)  Edema Trace  Trace    Tele status Monitored remotely  Monitored remotely  Pain Pain Intensity 1: 9 (09/29/22 0949) 4 (pt c/o of feeling the edge of peeing but nothing. Primary nurse made aware     Orders   Duration: Start: 2202 End: 9579 Total: 4hrs   Dialyzer: Dialyzer/Set Up Inspection: Ryan Tejada (09/30/22 2202)   K Bath: Dialysate K (mEq/L): 2 (09/30/22 2202)   Ca Bath: Dialysate CA (mEq/L): 2.5 (09/30/22 2202)   Na: Dialysate NA (mEq/L): 138 (09/30/22 2202)   Bicarb: Dialysate HCO3 (mEq/L): 35 (09/30/22 2202)   Target Fluid Removal: Goal/Amount of Fluid to Remove (mL): 4000 mL (09/30/22 2202)     Access   Type & Location: Right Subclavian CVC    Comments:                              No s/s of infection noted .  Dressing clean and intact           Labs   HBsAg (Antigen) / date:               Negative 09/16/2022                                HBsAb (Antibody) / date: Immune 09/16/2022   Source: Epic    Obtained/Reviewed  Critical Results Called HGB   Date Value Ref Range Status   09/30/2022 9.4 (L) 12.1 - 17.0 g/dL Final     Potassium   Date Value Ref Range Status   09/30/2022 4.1 3.5 - 5.1 mmol/L Final     Calcium   Date Value Ref Range Status   09/30/2022 9.9 8.5 - 10.1 MG/DL Final     BUN   Date Value Ref Range Status   09/30/2022 43 (H) 6 - 20 MG/DL Final     Creatinine   Date Value Ref Range Status   09/30/2022 6.17 (H) 0.70 - 1.30 MG/DL Final        Meds Given   Name Dose Route   Heparin  2.1/2.2ml  CVC Heparin Lock               Adequacy / Fluid    Total Liters Process: 86.5L   Net Fluid Removed: 4000ML      Comments   Time Out Done:   (Time) 2200   Admitting Diagnosis: ESRD    Consent obtained/signed: Informed Consent Verified: Yes (09/30/22 2202)   Oleksandr / Jian Johnson # Machine Number: [id number] (09/30/22 0871)   Primary Nurse Rpt Pre: Stephane Montoya RN    Primary Nurse Rpt Post: Stephane Montoya RN   Pt Education: Procedural Education    Care Plan: Continue with HD care plan    Pts outpatient clinic: N/a      Tx Summary   Comments:    2202:Each catheter limb disinfected per p&p, caps removed, hubs disinfected per p&p. Both lines aspirated and flushed with no difficulty. Treatment initiated with 200ml nss given. CVC running well at 400. No distress reported. Will continue to monitor patient. 0224:Treatment completed with all possible blood returned. Each dialysis catheter limb disinfected per p&p, blood returned per p&p, each dialysis hub disinfected per p&p, post dialysis catheter dwell instilled per order, and caps applied. SBAR given to primary nurse. At baseline upon departure.

## 2022-10-01 NOTE — PROGRESS NOTES
Rapid Response called overhead at this time, RRT responding    Per primary nurse rapid called for A-fib, patient had onset 9/28 after vascular surgery. Patient denies chest pain or shortness of breath. Reports he is \"severely constipated\"     EKG ordered and performed by RRT. Patient has scheduled metoprolol.     RRT will continue to follow

## 2022-10-01 NOTE — PROGRESS NOTES
[address] [facility] Adult  Hospitalist Group                                                                                          Hospitalist Progress Note  Elisabeth Webster NP  Answering service: [phone] from in house phone        Date of Service:  10/1/2022  NAME:  Ani Smith  :  1960  MRN:  [id number]      Admission Summary:   Ani Smith is a 58 y.o. male who presents with fall and ankle fracture. Patient has past medical history of end-stage renal disease on hemodialysis. He said he struck his head he complains of left ankle injury. There was an injury over the medial malleolus . Right now patient complains of severe ankle pain was sedated for left ankle stabilization. Denies any chest pain, shortness of breath, fevers or recent illnesses. No headache. He does receive heparin for dialysis. He was due for dialysis today and typically receives it  and Friday. Last dialysis was Wednesday. He sees Dr. Alberto Funez. Nephrology consulted for resuming hemodialysis     The patient denies any fever, chills, chest pain, cough, congestion, recent illness, palpitations, or dysuria. Interval history / Subjective: Follow-up for issues listed below.   -Patient seen and examined. Rapid Response 10/1: patient with 's while attempting to have a BM with return to baseline upon relaxation. No CP, no SOB, skin w/d. .     Assessment & Plan:     Ankle Fracture:  -XR Ankle,LT  : lateral dislocation at tibiotalar joint, comminuted fracture of the distal fibular shaft with lateral angulation and fracture fragment overlap, substantial soft tissue swelling, vascular calcification evident.   -Post Reduction XR Ankle LT : improved alignment with cast in place.   -Ortho Surgery: ORIF LT Ankle   -pain control  -miralax, senna     Near Syncope: leading to fall and blunt head trauma  -CT head wo : no acute abnormality.   -ECHO : normal left ventricular systolic function with a visually estimated EF of 50 - 55%. Left and right ventricle size is normal. Mildly increased wall thickness. Normal wall motion.  -Carotid Doppler 9/16: No evidence of right or left internal carotid artery stenosis. Vertebral arteries are patent with antegrade flow. *Incidental finding of large left thyroid nodule measuring 3.7 x 3.2. Alternate imaging recommended, follow up out patient with PCP. AFIB: new onset.  -Cardiology following  -continue BB  -CXR 9/16: no acute findings    Constipation: Rapid Response 10/1: patient with 's while attempting to have a BM with return to baseline upon relaxation.  -soap suds enema, bisacodyl enema  -continue miralax and senna  -KUB 10/1: Large volume stool in rectum, dilated small bowel loops suggestive of ileus or obstruction. -CT abd/pelv w/wo oral contrast: pending  -Gastro consult  -EKG 10/1: AFIB w/ RVR     ESRD on HD MWF: Renal following (see notes)  HTN: BP waxes and wanes. Continue current therapy. T2DM: ISS, BGL achs, basal insulin. Obesity: BMI: 41.77, low fat, low Na,low carb diet advised. Urinary Retention: 2/2 to anesthesia?, Holloway, placed 9/30, failed voiding trial. Continue flomax, voiding trail at [facility]. Anemia of CKD: renal following, continue current therapy. DVTppx: heparin  Code Status: Full Code  Diet: Cardiac  Activity: PT/OT  Discharge: [facility]  Ambulates: PT/OT     Hospital Problems  Date Reviewed: 9/28/2022            Codes Class Noted POA    Bimalleolar fracture of left ankle ICD-10-CM: S82.842A  ICD-9-CM: 824.4  9/17/2022 Unknown        Syncope ICD-10-CM: R55  ICD-9-CM: 780.2  9/16/2022 Unknown             Review of Systems:   A comprehensive review of systems was negative except for that written in the HPI. Vital Signs:    Last 24hrs VS reviewed since prior progress note.  Most recent are:  Visit Vitals  BP (!) 163/94 (BP 1 Location: Right upper arm, BP Patient Position: At rest;Lying)   Pulse 88   Temp 97.7 °F (36.5 °C)   Resp 24   Ht 6' (1.829 m)   Wt 139.7 kg (308 lb)   SpO2 97%   BMI 41.77 kg/m²         Intake/Output Summary (Last 24 hours) at 10/1/2022 1115  Last data filed at 10/1/2022 0747  Gross per 24 hour   Intake --   Output 4125 ml   Net -4125 ml        Physical Examination:     I had a face to face encounter with this patient and independently examined them on 10/1/2022 as outlined below:          Constitutional:  No acute distress, cooperative, pleasant    ENT:  Oral mucosa moist.    Resp:  CTA bilaterally. No wheezing/rhonchi/rales. No accessory muscle use. CV:  Regular rhythm, normal rate, S1,S2.    GI/:  Soft, obese, non tender, no guarding, BS present. Holloway patent. Musculoskeletal:  + edema, warm, LLE cast in place. Neurologic:  Moves all extremities. AAOx3. Skin:  w/d. Psych:  Good insight, Not anxious nor agitated. Data Review:    Review and/or order of clinical lab test      Labs:     Recent Labs     10/01/22  0346 09/30/22  0100   WBC 6.3 7.1   HGB 10.2* 9.4*   HCT 30.1* 28.1*    211     Recent Labs     10/01/22  0346 09/30/22  0100 09/29/22  0947   * 132* 134*   K 4.2 4.1 3.8    97 98   CO2 26 25 30   BUN 24* 43* 33*   CREA 3.78* 6.17* 5.23*   * 131* 94   CA 10.4* 9.9 10.4*   MG 2.0 2.1 2.1   PHOS 2.9 5.8*  --      No results for input(s): ALT, AP, TBIL, TBILI, TP, ALB, GLOB, GGT, AML, LPSE in the last 72 hours. No lab exists for component: SGOT, GPT, AMYP, HLPSE  No results for input(s): INR, PTP, APTT, INREXT in the last 72 hours. No results for input(s): FE, TIBC, PSAT, FERR in the last 72 hours. No results found for: FOL, RBCF   No results for input(s): PH, PCO2, PO2 in the last 72 hours. No results for input(s): CPK, CKNDX, TROIQ in the last 72 hours.     No lab exists for component: CPKMB  Lab Results   Component Value Date/Time    Cholesterol, total 80 09/30/2022 01:00 AM    HDL Cholesterol 20 09/30/2022 01:00 AM    LDL, calculated 36.2 09/30/2022 01:00 AM    Triglyceride 119 09/30/2022 01:00 AM    CHOL/HDL Ratio 4.0 09/30/2022 01:00 AM     Lab Results   Component Value Date/Time    Glucose (POC) 148 (H) 10/01/2022 10:23 AM    Glucose (POC) 132 (H) 10/01/2022 07:24 AM    Glucose (POC) 109 09/30/2022 10:05 PM    Glucose (POC) 84 09/30/2022 04:06 PM    Glucose (POC) 165 (H) 09/30/2022 11:33 AM     Lab Results   Component Value Date/Time    Color YELLOW/STRAW 09/16/2022 09:00 PM    Appearance CLEAR 09/16/2022 09:00 PM    Specific gravity 1.017 09/16/2022 09:00 PM    pH (UA) 5.5 09/16/2022 09:00 PM    Protein >300 (A) 09/16/2022 09:00 PM    Glucose 250 (A) 09/16/2022 09:00 PM    Ketone Negative 09/16/2022 09:00 PM    Bilirubin Negative 09/16/2022 09:00 PM    Urobilinogen 0.2 09/16/2022 09:00 PM    Nitrites Negative 09/16/2022 09:00 PM    Leukocyte Esterase Negative 09/16/2022 09:00 PM    Epithelial cells FEW 09/16/2022 09:00 PM    Bacteria Negative 09/16/2022 09:00 PM    WBC 0-4 09/16/2022 09:00 PM    RBC 5-10 09/16/2022 09:00 PM         Medications Reviewed:     Current Facility-Administered Medications   Medication Dose Route Frequency    senna (SENOKOT) tablet 17.2 mg  2 Tablet Oral DAILY    epoetin rico-epbx (RETACRIT) injection 20,000 Units  20,000 Units SubCUTAneous Q MON, WED & FRI    LORazepam (ATIVAN) tablet 0.5 mg  0.5 mg Oral BID PRN    metoprolol tartrate (LOPRESSOR) tablet 25 mg  25 mg Oral Q12H    hydrALAZINE (APRESOLINE) 20 mg/mL injection 10 mg  10 mg IntraVENous Q4H PRN    heparin (porcine) injection 5,000 Units  5,000 Units SubCUTAneous Q8H    tamsulosin (FLOMAX) capsule 0.8 mg  0.8 mg Oral DAILY    polyethylene glycol (MIRALAX) packet 17 g  17 g Oral DAILY    bisacodyL (DULCOLAX) suppository 10 mg  10 mg Rectal DAILY PRN    losartan (COZAAR) tablet 50 mg  50 mg Oral DAILY    rosuvastatin (CRESTOR) tablet 10 mg  10 mg Oral QHS    furosemide (LASIX) tablet 80 mg  80 mg Oral BID    hydrOXYzine HCL (ATARAX) tablet  mg   mg Oral Q8H PRN    sodium chloride (NS) flush 5-40 mL  5-40 mL IntraVENous Q8H    sodium chloride (NS) flush 5-40 mL  5-40 mL IntraVENous PRN    oxyCODONE IR (ROXICODONE) tablet 5 mg  5 mg Oral Q4H PRN    naloxone (NARCAN) injection 0.4 mg  0.4 mg IntraVENous PRN    gabapentin (NEURONTIN) capsule 300 mg  300 mg Oral DIALYSIS MON, WED & FRI    insulin glargine (LANTUS) injection 40 Units  40 Units SubCUTAneous QHS    senna-docusate (PERICOLACE) 8.6-50 mg per tablet 1 Tablet  1 Tablet Oral DAILY    acetaminophen (TYLENOL) tablet 1,000 mg  1,000 mg Oral BID    insulin lispro (HUMALOG) injection 10 Units  10 Units SubCUTAneous TIDAC    insulin lispro (HUMALOG) injection   SubCUTAneous AC&HS    glucose chewable tablet 16 g  4 Tablet Oral PRN    glucagon (GLUCAGEN) injection 1 mg  1 mg IntraMUSCular PRN    dextrose 10 % infusion 0-250 mL  0-250 mL IntraVENous PRN    albumin human 25% (BUMINATE) solution 25 g  25 g IntraVENous DIALYSIS PRN    sodium chloride (NS) flush 5-40 mL  5-40 mL IntraVENous Q8H    sodium chloride (NS) flush 5-40 mL  5-40 mL IntraVENous PRN    ondansetron (ZOFRAN ODT) tablet 4 mg  4 mg Oral Q8H PRN    Or    ondansetron (ZOFRAN) injection 4 mg  4 mg IntraVENous Q6H PRN    acetaminophen (TYLENOL) tablet 650 mg  650 mg Oral Q4H PRN    Or    acetaminophen (TYLENOL) suppository 650 mg  650 mg Rectal Q6H PRN    heparin (porcine) 1,000 unit/mL injection 2,200 Units  2,200 Units InterCATHeter DIALYSIS PRN    And    heparin (porcine) 1,000 unit/mL injection 2,100 Units  2,100 Units InterCATHeter DIALYSIS PRN     ______________________________________________________________________  EXPECTED LENGTH OF STAY: 6d 4h  ACTUAL LENGTH OF STAY:          [address] [facility],3M, NP

## 2022-10-02 LAB
ANION GAP SERPL CALC-SCNC: 7 MMOL/L (ref 5–15)
BASOPHILS # BLD: 0 K/UL (ref 0–0.1)
BASOPHILS NFR BLD: 0 % (ref 0–1)
BUN SERPL-MCNC: 44 MG/DL (ref 6–20)
BUN/CREAT SERPL: 8 (ref 12–20)
CALCIUM SERPL-MCNC: 9.9 MG/DL (ref 8.5–10.1)
CHLORIDE SERPL-SCNC: 100 MMOL/L (ref 97–108)
CO2 SERPL-SCNC: 25 MMOL/L (ref 21–32)
CREAT SERPL-MCNC: 5.76 MG/DL (ref 0.7–1.3)
DIFFERENTIAL METHOD BLD: ABNORMAL
EOSINOPHIL # BLD: 0.1 K/UL (ref 0–0.4)
EOSINOPHIL NFR BLD: 2 % (ref 0–7)
ERYTHROCYTE [DISTWIDTH] IN BLOOD BY AUTOMATED COUNT: 12.3 % (ref 11.5–14.5)
GLUCOSE BLD STRIP.AUTO-MCNC: 143 MG/DL (ref 65–117)
GLUCOSE BLD STRIP.AUTO-MCNC: 199 MG/DL (ref 65–117)
GLUCOSE BLD STRIP.AUTO-MCNC: 209 MG/DL (ref 65–117)
GLUCOSE BLD STRIP.AUTO-MCNC: 83 MG/DL (ref 65–117)
GLUCOSE SERPL-MCNC: 156 MG/DL (ref 65–100)
HCT VFR BLD AUTO: 28.5 % (ref 36.6–50.3)
HGB BLD-MCNC: 9.7 G/DL (ref 12.1–17)
IMM GRANULOCYTES # BLD AUTO: 0 K/UL (ref 0–0.04)
IMM GRANULOCYTES NFR BLD AUTO: 0 % (ref 0–0.5)
LYMPHOCYTES # BLD: 1.1 K/UL (ref 0.8–3.5)
LYMPHOCYTES NFR BLD: 15 % (ref 12–49)
MAGNESIUM SERPL-MCNC: 2.2 MG/DL (ref 1.6–2.4)
MCH RBC QN AUTO: 30.9 PG (ref 26–34)
MCHC RBC AUTO-ENTMCNC: 34 G/DL (ref 30–36.5)
MCV RBC AUTO: 90.8 FL (ref 80–99)
MONOCYTES # BLD: 0.5 K/UL (ref 0–1)
MONOCYTES NFR BLD: 7 % (ref 5–13)
NEUTS SEG # BLD: 5.4 K/UL (ref 1.8–8)
NEUTS SEG NFR BLD: 76 % (ref 32–75)
NRBC # BLD: 0 K/UL (ref 0–0.01)
NRBC BLD-RTO: 0 PER 100 WBC
PHOSPHATE SERPL-MCNC: 5.9 MG/DL (ref 2.6–4.7)
PLATELET # BLD AUTO: 227 K/UL (ref 150–400)
PMV BLD AUTO: 9.3 FL (ref 8.9–12.9)
POTASSIUM SERPL-SCNC: 4.1 MMOL/L (ref 3.5–5.1)
RBC # BLD AUTO: 3.14 M/UL (ref 4.1–5.7)
SERVICE CMNT-IMP: ABNORMAL
SERVICE CMNT-IMP: NORMAL
SODIUM SERPL-SCNC: 132 MMOL/L (ref 136–145)
WBC # BLD AUTO: 7.2 K/UL (ref 4.1–11.1)

## 2022-10-02 PROCEDURE — 83735 ASSAY OF MAGNESIUM: CPT

## 2022-10-02 PROCEDURE — 74011250637 HC RX REV CODE- 250/637

## 2022-10-02 PROCEDURE — 36415 COLL VENOUS BLD VENIPUNCTURE: CPT

## 2022-10-02 PROCEDURE — 74011250637 HC RX REV CODE- 250/637: Performed by: NURSE PRACTITIONER

## 2022-10-02 PROCEDURE — 82962 GLUCOSE BLOOD TEST: CPT

## 2022-10-02 PROCEDURE — 84100 ASSAY OF PHOSPHORUS: CPT

## 2022-10-02 PROCEDURE — 74011636637 HC RX REV CODE- 636/637

## 2022-10-02 PROCEDURE — 80048 BASIC METABOLIC PNL TOTAL CA: CPT

## 2022-10-02 PROCEDURE — 85025 COMPLETE CBC W/AUTO DIFF WBC: CPT

## 2022-10-02 PROCEDURE — 65270000046 HC RM TELEMETRY

## 2022-10-02 PROCEDURE — 74011250636 HC RX REV CODE- 250/636

## 2022-10-02 PROCEDURE — 74011000250 HC RX REV CODE- 250

## 2022-10-02 RX ORDER — MAGNESIUM CITRATE
148 SOLUTION, ORAL ORAL
Status: DISCONTINUED | OUTPATIENT
Start: 2022-10-02 | End: 2022-10-02 | Stop reason: RX

## 2022-10-02 RX ADMIN — LOSARTAN POTASSIUM 50 MG: 50 TABLET, FILM COATED ORAL at 09:19

## 2022-10-02 RX ADMIN — SODIUM CHLORIDE, PRESERVATIVE FREE 10 ML: 5 INJECTION INTRAVENOUS at 07:07

## 2022-10-02 RX ADMIN — Medication 2 UNITS: at 21:09

## 2022-10-02 RX ADMIN — SENNOSIDES 17.2 MG: 8.6 TABLET, FILM COATED ORAL at 09:18

## 2022-10-02 RX ADMIN — TAMSULOSIN HYDROCHLORIDE 0.8 MG: 0.4 CAPSULE ORAL at 09:19

## 2022-10-02 RX ADMIN — METOPROLOL TARTRATE 25 MG: 25 TABLET, FILM COATED ORAL at 09:19

## 2022-10-02 RX ADMIN — FUROSEMIDE 80 MG: 40 TABLET ORAL at 09:19

## 2022-10-02 RX ADMIN — Medication 10 UNITS: at 12:17

## 2022-10-02 RX ADMIN — ROSUVASTATIN CALCIUM 10 MG: 10 TABLET, FILM COATED ORAL at 21:09

## 2022-10-02 RX ADMIN — SODIUM CHLORIDE, PRESERVATIVE FREE 10 ML: 5 INJECTION INTRAVENOUS at 14:00

## 2022-10-02 RX ADMIN — SODIUM CHLORIDE, PRESERVATIVE FREE 10 ML: 5 INJECTION INTRAVENOUS at 21:10

## 2022-10-02 RX ADMIN — METOPROLOL TARTRATE 25 MG: 25 TABLET, FILM COATED ORAL at 21:09

## 2022-10-02 RX ADMIN — FUROSEMIDE 80 MG: 40 TABLET ORAL at 18:05

## 2022-10-02 RX ADMIN — HEPARIN SODIUM 5000 UNITS: 5000 INJECTION INTRAVENOUS; SUBCUTANEOUS at 23:43

## 2022-10-02 RX ADMIN — Medication 2 UNITS: at 07:06

## 2022-10-02 RX ADMIN — HEPARIN SODIUM 5000 UNITS: 5000 INJECTION INTRAVENOUS; SUBCUTANEOUS at 07:06

## 2022-10-02 RX ADMIN — POLYETHYLENE GLYCOL 3350 17 G: 17 POWDER, FOR SOLUTION ORAL at 09:18

## 2022-10-02 RX ADMIN — DOCUSATE SODIUM 50MG AND SENNOSIDES 8.6MG 1 TABLET: 8.6; 5 TABLET, FILM COATED ORAL at 09:18

## 2022-10-02 RX ADMIN — ACETAMINOPHEN 1000 MG: 500 TABLET, FILM COATED ORAL at 09:18

## 2022-10-02 RX ADMIN — HEPARIN SODIUM 5000 UNITS: 5000 INJECTION INTRAVENOUS; SUBCUTANEOUS at 16:53

## 2022-10-02 RX ADMIN — ACETAMINOPHEN 1000 MG: 500 TABLET, FILM COATED ORAL at 18:05

## 2022-10-02 RX ADMIN — INSULIN GLARGINE 40 UNITS: 100 INJECTION, SOLUTION SUBCUTANEOUS at 21:08

## 2022-10-02 RX ADMIN — Medication 2 UNITS: at 12:18

## 2022-10-02 NOTE — PROGRESS NOTES
Problem: Falls - Risk of  Goal: *Absence of Falls  Description: Document Alexa Ground Fall Risk and appropriate interventions in the flowsheet.   Outcome: Progressing Towards Goal  Note: Fall Risk Interventions:  Mobility Interventions: Communicate number of staff needed for ambulation/transfer    Mentation Interventions: Door open when patient unattended    Medication Interventions: Teach patient to arise slowly    Elimination Interventions: Call light in reach    History of Falls Interventions: Evaluate medications/consider consulting pharmacy         Problem: Patient Education: Go to Patient Education Activity  Goal: Patient/Family Education  Outcome: Progressing Towards Goal

## 2022-10-02 NOTE — PROGRESS NOTES
ENIO was told that this pt may be ready to d/c to [facility] today. CM called Santiago Thomas ([phone]) in admissions and left her a voice mail asking if this pt can come there today. ENIO also called [facility] ([phone]) and was told that they do not accept pt's over the weekend. Will await call from Santiago Thomas.  Geoff Pablo

## 2022-10-02 NOTE — PROGRESS NOTES
[address] Adult  Hospitalist Group                                                                                          Hospitalist Progress Note  Zollie Bence, NP  Answering service: [phone] from in house phone        Date of Service:  10/2/2022  NAME:  Susa Romberg  :  1960  MRN:  [id number]      Admission Summary:   Susa Romberg is a 58 y.o. male who presents with fall and ankle fracture. Patient has past medical history of end-stage renal disease on hemodialysis. He said he struck his head he complains of left ankle injury. There was an injury over the medial malleolus . Right now patient complains of severe ankle pain was sedated for left ankle stabilization. Denies any chest pain, shortness of breath, fevers or recent illnesses. No headache. He does receive heparin for dialysis. He was due for dialysis today and typically receives it  and Friday. Last dialysis was Wednesday. He sees Dr. Paul Dalal. Nephrology consulted for resuming hemodialysis     The patient denies any fever, chills, chest pain, cough, congestion, recent illness, palpitations, or dysuria. Interval history / Subjective: Follow-up for issues listed below.   -Patient seen and examined. Assessment & Plan:     Ankle Fracture:  -XR Ankle,LT  : lateral dislocation at tibiotalar joint, comminuted fracture of the distal fibular shaft with lateral angulation and fracture fragment overlap, substantial soft tissue swelling, vascular calcification evident.   -Post Reduction XR Ankle LT : improved alignment with cast in place.   -Ortho Surgery: ORIF LT Ankle   -pain control  -miralax, senna     Near Syncope: leading to fall and blunt head trauma  -CT head wo : no acute abnormality. -ECHO : normal left ventricular systolic function with a visually estimated EF of 50 - 55%. Left and right ventricle size is normal. Mildly increased wall thickness. Normal wall motion.  -Carotid Doppler 9/16: No evidence of right or left internal carotid artery stenosis. Vertebral arteries are patent with antegrade flow. *Incidental finding of large left thyroid nodule measuring 3.7 x 3.2. Alternate imaging recommended, follow up out patient with PCP. AFIB: new onset.  -Cardiology following  -continue BB  -CXR 9/16: no acute findings     Constipation/Abd pain: Rapid Response 10/1: patient with 's while attempting to have a BM with return to baseline upon relaxation.  -soap suds enema, bisacodyl enema  -continue miralax and senna  -KUB 10/1: Large volume stool in rectum, dilated small bowel loops suggestive of ileus or obstruction. -CT abd/pelv w/wo oral contrast 10/1: constipation, no mechanical bowel obstruction, bladder wall thickening with surrounding fat stranding, correlate cystitis, left nephrectomy, incidental left adrenal benign myelolipoma, cholecystectomy. -EKG 10/1: AFIB w/ RVR   -cancel GI consult, CT revealed no obstruction  -patient has had multiple large BM's       ESRD on HD MWF: Renal following (see notes)  HTN: BP waxes and wanes. Continue current therapy. T2DM: ISS, BGL achs, basal insulin. Obesity: BMI: 41.77, low fat, low Na,low carb diet advised. Urinary Retention: 2/2 to anesthesia?, Holloway, placed 9/30, failed voiding trial. Continue flomax, voiding trail at [facility]. Anemia of CKD: renal following, continue current therapy. DVTppx: heparin  Code Status: Full Code  Diet: Cardiac  Activity: PT/OT  Discharge: [facility]  Ambulates: PT/OT     Hospital Problems  Date Reviewed: 9/28/2022            Codes Class Noted POA    Bimalleolar fracture of left ankle ICD-10-CM: S82.842A  ICD-9-CM: 824.4  9/17/2022 Unknown        Syncope ICD-10-CM: R55  ICD-9-CM: 780.2  9/16/2022 Unknown             Review of Systems:   A comprehensive review of systems was negative except for that written in the HPI.        Vital Signs:    Last 24hrs VS reviewed since prior progress note. Most recent are:  Visit Vitals  BP (!) 142/71   Pulse 88   Temp 97.8 °F (36.6 °C)   Resp 18   Ht 6' (1.829 m)   Wt 139.7 kg (308 lb)   SpO2 95%   BMI 41.77 kg/m²         Intake/Output Summary (Last 24 hours) at 10/2/2022 0807  Last data filed at 10/2/2022 0105  Gross per 24 hour   Intake --   Output 200 ml   Net -200 ml        Physical Examination:     I had a face to face encounter with this patient and independently examined them on 10/2/2022 as outlined below:          Constitutional:  No acute distress, cooperative, pleasant    ENT:  Oral mucosa moist.    Resp:  CTA bilaterally. No wheezing/rhonchi/rales. No accessory muscle use. CV:  Regular rhythm, normal rate, S1,S2.    GI/:  Soft, obese, non tender, no guarding, BS present. Holloway patent. Musculoskeletal:  + edema, warm, LLE cast in place. Neurologic:  Moves all extremities. AAOx3. Skin:  w/d. Psych:  Good insight, Not anxious nor agitated. Data Review:    Review and/or order of clinical lab test      Labs:     Recent Labs     10/02/22  0035 10/01/22  0346   WBC 7.2 6.3   HGB 9.7* 10.2*   HCT 28.5* 30.1*    220     Recent Labs     10/02/22  0035 10/01/22  0346 09/30/22  0100   * 135* 132*   K 4.1 4.2 4.1    102 97   CO2 25 26 25   BUN 44* 24* 43*   CREA 5.76* 3.78* 6.17*   * 125* 131*   CA 9.9 10.4* 9.9   MG 2.2 2.0 2.1   PHOS 5.9* 2.9 5.8*     No results for input(s): ALT, AP, TBIL, TBILI, TP, ALB, GLOB, GGT, AML, LPSE in the last 72 hours. No lab exists for component: SGOT, GPT, AMYP, HLPSE  No results for input(s): INR, PTP, APTT, INREXT in the last 72 hours. No results for input(s): FE, TIBC, PSAT, FERR in the last 72 hours. No results found for: FOL, RBCF   No results for input(s): PH, PCO2, PO2 in the last 72 hours. No results for input(s): CPK, CKNDX, TROIQ in the last 72 hours.     No lab exists for component: CPKMB  Lab Results   Component Value Date/Time Cholesterol, total 80 09/30/2022 01:00 AM    HDL Cholesterol 20 09/30/2022 01:00 AM    LDL, calculated 36.2 09/30/2022 01:00 AM    Triglyceride 119 09/30/2022 01:00 AM    CHOL/HDL Ratio 4.0 09/30/2022 01:00 AM     Lab Results   Component Value Date/Time    Glucose (POC) 143 (H) 10/02/2022 06:16 AM    Glucose (POC) 158 (H) 10/01/2022 10:05 PM    Glucose (POC) 150 (H) 10/01/2022 03:57 PM    Glucose (POC) 148 (H) 10/01/2022 10:23 AM    Glucose (POC) 132 (H) 10/01/2022 07:24 AM     Lab Results   Component Value Date/Time    Color YELLOW/STRAW 09/16/2022 09:00 PM    Appearance CLEAR 09/16/2022 09:00 PM    Specific gravity 1.017 09/16/2022 09:00 PM    pH (UA) 5.5 09/16/2022 09:00 PM    Protein >300 (A) 09/16/2022 09:00 PM    Glucose 250 (A) 09/16/2022 09:00 PM    Ketone Negative 09/16/2022 09:00 PM    Bilirubin Negative 09/16/2022 09:00 PM    Urobilinogen 0.2 09/16/2022 09:00 PM    Nitrites Negative 09/16/2022 09:00 PM    Leukocyte Esterase Negative 09/16/2022 09:00 PM    Epithelial cells FEW 09/16/2022 09:00 PM    Bacteria Negative 09/16/2022 09:00 PM    WBC 0-4 09/16/2022 09:00 PM    RBC 5-10 09/16/2022 09:00 PM         Medications Reviewed:     Current Facility-Administered Medications   Medication Dose Route Frequency    magnesium citrate solution 148 mL  148 mL Oral NOW    senna (SENOKOT) tablet 17.2 mg  2 Tablet Oral DAILY    epoetin rico-epbx (RETACRIT) injection 20,000 Units  20,000 Units SubCUTAneous Q MON, WED & FRI    LORazepam (ATIVAN) tablet 0.5 mg  0.5 mg Oral BID PRN    metoprolol tartrate (LOPRESSOR) tablet 25 mg  25 mg Oral Q12H    hydrALAZINE (APRESOLINE) 20 mg/mL injection 10 mg  10 mg IntraVENous Q4H PRN    heparin (porcine) injection 5,000 Units  5,000 Units SubCUTAneous Q8H    tamsulosin (FLOMAX) capsule 0.8 mg  0.8 mg Oral DAILY    polyethylene glycol (MIRALAX) packet 17 g  17 g Oral DAILY    bisacodyL (DULCOLAX) suppository 10 mg  10 mg Rectal DAILY PRN    losartan (COZAAR) tablet 50 mg  50 mg Oral DAILY    rosuvastatin (CRESTOR) tablet 10 mg  10 mg Oral QHS    furosemide (LASIX) tablet 80 mg  80 mg Oral BID    hydrOXYzine HCL (ATARAX) tablet  mg   mg Oral Q8H PRN    sodium chloride (NS) flush 5-40 mL  5-40 mL IntraVENous Q8H    sodium chloride (NS) flush 5-40 mL  5-40 mL IntraVENous PRN    oxyCODONE IR (ROXICODONE) tablet 5 mg  5 mg Oral Q4H PRN    naloxone (NARCAN) injection 0.4 mg  0.4 mg IntraVENous PRN    gabapentin (NEURONTIN) capsule 300 mg  300 mg Oral DIALYSIS MON, WED & FRI    insulin glargine (LANTUS) injection 40 Units  40 Units SubCUTAneous QHS    senna-docusate (PERICOLACE) 8.6-50 mg per tablet 1 Tablet  1 Tablet Oral DAILY    acetaminophen (TYLENOL) tablet 1,000 mg  1,000 mg Oral BID    insulin lispro (HUMALOG) injection 10 Units  10 Units SubCUTAneous TIDAC    insulin lispro (HUMALOG) injection   SubCUTAneous AC&HS    glucose chewable tablet 16 g  4 Tablet Oral PRN    glucagon (GLUCAGEN) injection 1 mg  1 mg IntraMUSCular PRN    dextrose 10 % infusion 0-250 mL  0-250 mL IntraVENous PRN    albumin human 25% (BUMINATE) solution 25 g  25 g IntraVENous DIALYSIS PRN    sodium chloride (NS) flush 5-40 mL  5-40 mL IntraVENous Q8H    sodium chloride (NS) flush 5-40 mL  5-40 mL IntraVENous PRN    ondansetron (ZOFRAN ODT) tablet 4 mg  4 mg Oral Q8H PRN    Or    ondansetron (ZOFRAN) injection 4 mg  4 mg IntraVENous Q6H PRN    acetaminophen (TYLENOL) tablet 650 mg  650 mg Oral Q4H PRN    Or    acetaminophen (TYLENOL) suppository 650 mg  650 mg Rectal Q6H PRN    heparin (porcine) 1,000 unit/mL injection 2,200 Units  2,200 Units InterCATHeter DIALYSIS PRN    And    heparin (porcine) 1,000 unit/mL injection 2,100 Units  2,100 Units InterCATHeter DIALYSIS PRN     ______________________________________________________________________  EXPECTED LENGTH OF STAY: 6d 4h  ACTUAL LENGTH OF STAY:          15                 Ginger Nowak NP

## 2022-10-03 VITALS
RESPIRATION RATE: 18 BRPM | SYSTOLIC BLOOD PRESSURE: 130 MMHG | BODY MASS INDEX: 41.72 KG/M2 | OXYGEN SATURATION: 97 % | DIASTOLIC BLOOD PRESSURE: 81 MMHG | HEIGHT: 72 IN | HEART RATE: 84 BPM | WEIGHT: 308 LBS | TEMPERATURE: 98.1 F

## 2022-10-03 LAB
ANION GAP SERPL CALC-SCNC: 9 MMOL/L (ref 5–15)
BASOPHILS # BLD: 0.1 K/UL (ref 0–0.1)
BASOPHILS NFR BLD: 1 % (ref 0–1)
BUN SERPL-MCNC: 61 MG/DL (ref 6–20)
BUN/CREAT SERPL: 8 (ref 12–20)
CALCIUM SERPL-MCNC: 9.9 MG/DL (ref 8.5–10.1)
CHLORIDE SERPL-SCNC: 98 MMOL/L (ref 97–108)
CO2 SERPL-SCNC: 23 MMOL/L (ref 21–32)
CREAT SERPL-MCNC: 7.72 MG/DL (ref 0.7–1.3)
DIFFERENTIAL METHOD BLD: ABNORMAL
EOSINOPHIL # BLD: 0.3 K/UL (ref 0–0.4)
EOSINOPHIL NFR BLD: 3 % (ref 0–7)
ERYTHROCYTE [DISTWIDTH] IN BLOOD BY AUTOMATED COUNT: 12.4 % (ref 11.5–14.5)
GLUCOSE BLD STRIP.AUTO-MCNC: 139 MG/DL (ref 65–117)
GLUCOSE SERPL-MCNC: 118 MG/DL (ref 65–100)
HCT VFR BLD AUTO: 28.4 % (ref 36.6–50.3)
HGB BLD-MCNC: 9.5 G/DL (ref 12.1–17)
IMM GRANULOCYTES # BLD AUTO: 0.1 K/UL (ref 0–0.04)
IMM GRANULOCYTES NFR BLD AUTO: 1 % (ref 0–0.5)
INR PPP: 1.1 (ref 0.9–1.1)
LYMPHOCYTES # BLD: 1.5 K/UL (ref 0.8–3.5)
LYMPHOCYTES NFR BLD: 19 % (ref 12–49)
MAGNESIUM SERPL-MCNC: 2.2 MG/DL (ref 1.6–2.4)
MCH RBC QN AUTO: 30.9 PG (ref 26–34)
MCHC RBC AUTO-ENTMCNC: 33.5 G/DL (ref 30–36.5)
MCV RBC AUTO: 92.5 FL (ref 80–99)
MONOCYTES # BLD: 0.6 K/UL (ref 0–1)
MONOCYTES NFR BLD: 8 % (ref 5–13)
NEUTS SEG # BLD: 5.5 K/UL (ref 1.8–8)
NEUTS SEG NFR BLD: 68 % (ref 32–75)
NRBC # BLD: 0 K/UL (ref 0–0.01)
NRBC BLD-RTO: 0 PER 100 WBC
PHOSPHATE SERPL-MCNC: 6.3 MG/DL (ref 2.6–4.7)
PLATELET # BLD AUTO: 232 K/UL (ref 150–400)
PMV BLD AUTO: 9.4 FL (ref 8.9–12.9)
POTASSIUM SERPL-SCNC: 4.4 MMOL/L (ref 3.5–5.1)
PROTHROMBIN TIME: 11.4 SEC (ref 9–11.1)
RBC # BLD AUTO: 3.07 M/UL (ref 4.1–5.7)
SERVICE CMNT-IMP: ABNORMAL
SODIUM SERPL-SCNC: 130 MMOL/L (ref 136–145)
WBC # BLD AUTO: 8 K/UL (ref 4.1–11.1)

## 2022-10-03 PROCEDURE — 74011000250 HC RX REV CODE- 250

## 2022-10-03 PROCEDURE — 80048 BASIC METABOLIC PNL TOTAL CA: CPT

## 2022-10-03 PROCEDURE — 82962 GLUCOSE BLOOD TEST: CPT

## 2022-10-03 PROCEDURE — 74011250637 HC RX REV CODE- 250/637

## 2022-10-03 PROCEDURE — 74011250636 HC RX REV CODE- 250/636

## 2022-10-03 PROCEDURE — 85610 PROTHROMBIN TIME: CPT

## 2022-10-03 PROCEDURE — 85025 COMPLETE CBC W/AUTO DIFF WBC: CPT

## 2022-10-03 PROCEDURE — P9047 ALBUMIN (HUMAN), 25%, 50ML: HCPCS

## 2022-10-03 PROCEDURE — 36415 COLL VENOUS BLD VENIPUNCTURE: CPT

## 2022-10-03 PROCEDURE — 83735 ASSAY OF MAGNESIUM: CPT

## 2022-10-03 PROCEDURE — 84100 ASSAY OF PHOSPHORUS: CPT

## 2022-10-03 PROCEDURE — 74011250637 HC RX REV CODE- 250/637: Performed by: NURSE PRACTITIONER

## 2022-10-03 PROCEDURE — 90935 HEMODIALYSIS ONE EVALUATION: CPT

## 2022-10-03 RX ORDER — TAMSULOSIN HYDROCHLORIDE 0.4 MG/1
0.8 CAPSULE ORAL DAILY
Qty: 90 CAPSULE | Refills: 0 | Status: SHIPPED
Start: 2022-10-04

## 2022-10-03 RX ORDER — ROSUVASTATIN CALCIUM 10 MG/1
10 TABLET, COATED ORAL
Qty: 90 TABLET | Refills: 0 | Status: SHIPPED | OUTPATIENT
Start: 2022-10-03 | End: 2022-10-03 | Stop reason: SDUPTHER

## 2022-10-03 RX ORDER — GABAPENTIN 300 MG/1
300 CAPSULE ORAL
Qty: 90 CAPSULE | Refills: 0 | Status: SHIPPED | OUTPATIENT
Start: 2022-10-03

## 2022-10-03 RX ORDER — ONDANSETRON 4 MG/1
4 TABLET, ORALLY DISINTEGRATING ORAL
Qty: 20 TABLET | Refills: 0 | Status: SHIPPED
Start: 2022-10-03

## 2022-10-03 RX ORDER — AMOXICILLIN 250 MG
2 CAPSULE ORAL DAILY
Qty: 90 TABLET | Refills: 0 | Status: SHIPPED
Start: 2022-10-04

## 2022-10-03 RX ORDER — LOSARTAN POTASSIUM 50 MG/1
50 TABLET ORAL DAILY
Qty: 90 TABLET | Refills: 0 | Status: SHIPPED
Start: 2022-10-04

## 2022-10-03 RX ORDER — METOPROLOL TARTRATE 25 MG/1
25 TABLET, FILM COATED ORAL EVERY 12 HOURS
Qty: 90 TABLET | Refills: 0 | Status: SHIPPED
Start: 2022-10-03

## 2022-10-03 RX ORDER — WARFARIN SODIUM 5 MG/1
5 TABLET ORAL DAILY
Qty: 1 TABLET | Refills: 0 | Status: SHIPPED
Start: 2022-10-03

## 2022-10-03 RX ORDER — ROSUVASTATIN CALCIUM 10 MG/1
10 TABLET, COATED ORAL
Qty: 90 TABLET | Refills: 0 | Status: SHIPPED
Start: 2022-10-03

## 2022-10-03 RX ORDER — ACETAMINOPHEN 325 MG/1
650 TABLET ORAL
Qty: 90 TABLET | Refills: 0 | Status: SHIPPED
Start: 2022-10-03

## 2022-10-03 RX ORDER — POLYETHYLENE GLYCOL 3350 17 G/17G
17 POWDER, FOR SOLUTION ORAL DAILY
Qty: 1 EACH | Refills: 0 | Status: SHIPPED
Start: 2022-10-04

## 2022-10-03 RX ORDER — WARFARIN SODIUM 5 MG/1
5 TABLET ORAL
Status: COMPLETED | OUTPATIENT
Start: 2022-10-03 | End: 2022-10-03

## 2022-10-03 RX ORDER — FACIAL-BODY WIPES
10 EACH TOPICAL
Qty: 10 SUPPOSITORY | Refills: 0 | Status: SHIPPED
Start: 2022-10-03

## 2022-10-03 RX ADMIN — ACETAMINOPHEN 1000 MG: 500 TABLET, FILM COATED ORAL at 09:09

## 2022-10-03 RX ADMIN — TAMSULOSIN HYDROCHLORIDE 0.8 MG: 0.4 CAPSULE ORAL at 09:10

## 2022-10-03 RX ADMIN — SODIUM CHLORIDE, PRESERVATIVE FREE 10 ML: 5 INJECTION INTRAVENOUS at 06:05

## 2022-10-03 RX ADMIN — SODIUM CHLORIDE, PRESERVATIVE FREE 10 ML: 5 INJECTION INTRAVENOUS at 06:12

## 2022-10-03 RX ADMIN — HEPARIN SODIUM 5000 UNITS: 5000 INJECTION INTRAVENOUS; SUBCUTANEOUS at 06:05

## 2022-10-03 RX ADMIN — HEPARIN SODIUM 5000 UNITS: 5000 INJECTION INTRAVENOUS; SUBCUTANEOUS at 14:45

## 2022-10-03 RX ADMIN — GABAPENTIN 300 MG: 300 CAPSULE ORAL at 15:42

## 2022-10-03 RX ADMIN — HEPARIN SODIUM 2200 UNITS: 1000 INJECTION INTRAVENOUS; SUBCUTANEOUS at 14:02

## 2022-10-03 RX ADMIN — HEPARIN SODIUM 2100 UNITS: 1000 INJECTION INTRAVENOUS; SUBCUTANEOUS at 14:02

## 2022-10-03 RX ADMIN — ALBUMIN (HUMAN) 25 G: 0.25 INJECTION, SOLUTION INTRAVENOUS at 10:30

## 2022-10-03 RX ADMIN — ONDANSETRON 4 MG: 2 INJECTION INTRAMUSCULAR; INTRAVENOUS at 10:58

## 2022-10-03 RX ADMIN — WARFARIN SODIUM 5 MG: 5 TABLET ORAL at 14:45

## 2022-10-03 NOTE — DISCHARGE SUMMARY
Discharge Summary       PATIENT ID: Manpreet Mclaughlin  MRN: [id number]   YOB: 1960    DATE OF ADMISSION: 9/16/2022  9:32 AM    DATE OF DISCHARGE: 10/3/2022     PRIMARY CARE PROVIDER: Josie Terry MD     ATTENDING PHYSICIAN: Candice Epperson MD  DISCHARGING PROVIDER: Brandon Mcmullen NP    To contact this individual call [phone] and ask the  to page. If unavailable ask to be transferred the Adult Hospitalist Department. CONSULTATIONS: IP CONSULT TO ORTHOPEDIC SURGERY  IP CONSULT TO NEPHROLOGY  IP CONSULT TO CARDIOLOGY    PROCEDURES/SURGERIES: Procedure(s):  LEFT UPPER ARM CEPHALIC VEIN TRANSPOSITION FISTULA    DISCHARGE DIAGNOSES:     Left Ankle Fracture, Near Syncope, AFIB (new onset), Constipation, ESRD on HD, HTN, T2DM, Obesity, Urinary Retention, Anemia of CKD      ADMISSION SUMMARY AND HOSPITAL COURSE:     Manpreet Mclaughlin is a 58 y.o. male who presents with fall and ankle fracture. Patient has past medical history of end-stage renal disease on hemodialysis. He said he struck his head he complains of left ankle injury. There was an injury over the medial malleolus . Right now patient complains of severe ankle pain was sedated for left ankle stabilization. Denies any chest pain, shortness of breath, fevers or recent illnesses. No headache. He does receive heparin for dialysis. He was due for dialysis today and typically receives it Monday Wednesday and Friday. Last dialysis was Wednesday. He sees Dr. Katt Barbosa. Nephrology consulted for resuming hemodialysis.     DISCHARGE DIAGNOSES / PLAN:      Ankle Fracture:  -XR Ankle,LT  9/16: lateral dislocation at tibiotalar joint, comminuted fracture of the distal fibular shaft with lateral angulation and fracture fragment overlap, substantial soft tissue swelling, vascular calcification evident.   -Post Reduction XR Ankle LT 9/16: improved alignment with cast in place.   -Ortho Surgery: ORIF LT Ankle 9/20, follow up out patient  -pain control  -miralax, senna     Near Syncope: leading to fall and blunt head trauma. Stable. -CT head wo 9/16: no acute abnormality. -ECHO 9/29: normal left ventricular systolic function with a visually estimated EF of 50 - 55%. Left and right ventricle size is normal. Mildly increased wall thickness. Normal wall motion.  -Carotid Doppler 9/16: No evidence of right or left internal carotid artery stenosis. Vertebral arteries are patent with antegrade flow. *Incidental finding of large left thyroid nodule measuring 3.7 x 3.2. Alternate imaging recommended, follow up out patient with PCP. AFIB: new onset. Warfarin for goal INR 2-3.  -Cardiology follow up out patient  -continue BB  -CXR 9/16: no acute findings     Constipation: Rapid Response 10/1: patient with 's while attempting to have a BM with return to baseline upon relaxation. RESOLVED  -soap suds enema, bisacodyl enema  -continue miralax and senna  -KUB 10/1: Large volume stool in rectum, dilated small bowel loops suggestive of ileus or obstruction. -CT abd/pelv w/wo oral contrast: pending  -Gastro consult  -EKG 10/1: AFIB w/ RVR      ESRD on HD MWF: Renal following (see notes)  HTN: BP waxes and wanes. Continue current therapy. T2DM: ISS, BGL achs, basal insulin. Obesity: BMI: 41.77, low fat, low Na,low carb diet advised. Urinary Retention: 2/2 to anesthesia?, Holloway, placed 9/30, failed voiding trial. Continue flomax, voiding trail at [facility]. Anemia of CKD: renal following, continue current therapy. BMI: Body mass index is 41.77 kg/m². . This patient: Meets criteria for severe obesity given BMI >/= to 40 due to excess calories/nutritional. Weight loss and lifestyle modifications should be encouraged as an outpatient. PENDING TEST RESULTS:   At the time of discharge the following test results are still pending: none.      ADDITIONAL CARE RECOMMENDATIONS:   You have been deemed stable for discharge and are being discharged to [facility]. You are taking a blood thinner WARFARIN- should you develop any abnormal or prolonged bleeding- seek care at nearest ER. Monitor INR daily for goal INR 2-3: AFIB. You have completed all prescribed antibiotics. Should you need medication refills beyond what we have prescribed for you, you will need to contact your primary care provider for refills. Return to the ER or notify your primary care office if you have a fever greater than 101.5 associated with chills, chest pain, or signs and symptoms of a stroke which are:  B E F A S T  -loss of balance, headaches or dizziness  -eyes blurred vision (sudden)  -asymmetrical facial symmetry (side of face droops)  -arms and leg weakness  -slurred speech / difficulty speaking  -if you experience any of these (time to call for an ambulance)     NOTIFY YOUR PHYSICIAN FOR ANY OF THE FOLLOWING:   Fever over 101 degrees for 24 hours. Chest pain, shortness of breath, fever, chills, nausea, vomiting, diarrhea, change in mentation, falling, weakness, bleeding. Severe pain or pain not relieved by medications, as well as any other signs or symptoms that you may have questions about.     FOLLOW UP APPOINTMENTS:    Follow-up Information       Follow up With Specialties Details Why Contact Info    [address] [place] [address] Makayla Rossi [facility] [address] [phone]    Janny Cardenas MD Cardiovascular Disease Physician Follow up on 10/10/2022 [address] [phone]      Jose Garcia MD Family Medicine Follow up in 3 day(s)  [address] [address] [phone]      Russel Mcgregor MD Nephrology Follow up in 1 day(s) MWF at [facility] for Hemodialysis [facility]  [address] [facility] MD [facility] Follow up in 1 week(s) Left Ankle Fracture [address]  [place] [address]  [phone]               DIET: Cardiac Diet    ACTIVITY: PT/OT Eval and Treat    WOUND CARE: Keep skin warm and dry. EQUIPMENT needed: per PT/OT recommendations       DISCHARGE MEDICATIONS:  Current Discharge Medication List        START taking these medications    Details   gabapentin (NEURONTIN) 300 mg capsule Take 1 Capsule by mouth DIALYSIS MON, WED & FRI. Max Daily Amount: 300 mg. Qty: 90 Capsule, Refills: 0  Start date: 10/3/2022    Associated Diagnoses: Closed fracture of left ankle, initial encounter      warfarin (COUMADIN) 5 mg tablet Take 1 Tablet by mouth daily. If crushing required, wear gloves and use a pill  with self contained pouches. Pregnant staff should wear gloves when handling. Daily INR for goal INR of 2-3 for AFIB. Qty: 1 Tablet, Refills: 0  Start date: 10/3/2022      tamsulosin (FLOMAX) 0.4 mg capsule Take 2 Capsules by mouth daily. Qty: 90 Capsule, Refills: 0  Start date: 10/4/2022      senna-docusate (PERICOLACE) 8.6-50 mg per tablet Take 2 Tablets by mouth daily. Qty: 90 Tablet, Refills: 0  Start date: 10/4/2022      polyethylene glycol (MIRALAX) 17 gram packet Take 1 Packet by mouth daily. Qty: 1 Each, Refills: 0  Start date: 10/4/2022      ondansetron (ZOFRAN ODT) 4 mg disintegrating tablet Take 1 Tablet by mouth every eight (8) hours as needed for Nausea or Vomiting. Qty: 20 Tablet, Refills: 0  Start date: 10/3/2022      metoprolol tartrate (LOPRESSOR) 25 mg tablet Take 1 Tablet by mouth every twelve (12) hours. Qty: 90 Tablet, Refills: 0  Start date: 10/3/2022      epoetin rico-epbx (RETACRIT) 20,000 unit/2 mL injection 2 mL by SubCUTAneous route every Monday, Wednesday, Friday. Indications: anemia due to kidney failure  Qty: 3 Each, Refills: 0  Start date: 10/3/2022      bisacodyL (DULCOLAX) 10 mg supp Insert 10 mg into rectum daily as needed for Constipation.   Qty: 10 Suppository, Refills: 0  Start date: 10/3/2022 acetaminophen (TYLENOL) 325 mg tablet Take 2 Tablets by mouth every four (4) hours as needed for Pain or Fever. Qty: 90 Tablet, Refills: 0  Start date: 10/3/2022           CONTINUE these medications which have CHANGED    Details   losartan (COZAAR) 50 mg tablet Take 1 Tablet by mouth daily. Qty: 90 Tablet, Refills: 0  Start date: 10/4/2022      rosuvastatin (CRESTOR) 10 mg tablet Take 1 Tablet by mouth nightly. Qty: 90 Tablet, Refills: 0  Start date: 10/3/2022           CONTINUE these medications which have NOT CHANGED    Details   hydrOXYzine HCL (ATARAX) 25 mg tablet Take  mg by mouth every eight (8) hours as needed for Anxiety or Sleep. insulin aspart U-100 (NOVOLOG) 100 unit/mL (3 mL) inpn 10 Units by SubCUTAneous route Before breakfast, lunch, and dinner. !! insulin CONCENTRATED regular (HumuLIN R U-500) 500 unit/mL soln 175 Units by SubCUTAneous route Every morning. !! insulin CONCENTRATED regular (HumuLIN R U-500) 500 unit/mL soln 80 Units by SubCUTAneous route every evening. levothyroxine (SYNTHROID) 25 mcg tablet Take 25 mcg by mouth Daily (before breakfast). !! - Potential duplicate medications found. Please discuss with provider.         STOP taking these medications       furosemide (LASIX) 40 mg tablet Comments:   Reason for Stopping:         gabapentin (NEURONTIN) 600 mg tablet Comments:   Reason for Stopping:               DISPOSITION:    Home With:   OT  PT  HH  RN      X Long term SNF/Inpatient Rehab: [facility]    Independent/assisted living    Hospice    Other:       PATIENT CONDITION AT DISCHARGE:     Functional status    Poor    X Deconditioned     Independent      Cognition   X  Lucid     Forgetful     Dementia      Catheters/lines (plus indication)   X Holloway     PICC     PEG     None      Code status    X Full code     DNR      PHYSICAL EXAMINATION AT DISCHARGE:    Constitutional:  No acute distress, cooperative, pleasant    ENT:  Oral mucosa moist. Resp: CTA bilaterally. No wheezing/rhonchi/rales. No accessory muscle use. CV:  Regular rhythm, normal rate, S1,S2.    GI/:  Soft, obese, non tender, no guarding, BS present. Holloway patent. Musculoskeletal:  + edema, warm, LLE cast in place. Neurologic:  Moves all extremities. AAOx3. Skin:  w/d. Psych:  Good insight, Not anxious nor agitated.          CHRONIC MEDICAL DIAGNOSES:  Problem List as of 10/3/2022 Date Reviewed: 9/28/2022            Codes Class Noted - Resolved    Bimalleolar fracture of left ankle ICD-10-CM: S82.842A  ICD-9-CM: 824.4  9/17/2022 - Present        Syncope ICD-10-CM: R55  ICD-9-CM: 780.2  9/16/2022 - Present           Greater than 30 minutes were spent with the patient on counseling and coordination of care    Signed:   Susi Champagne NP  10/3/2022  11:47 AM

## 2022-10-03 NOTE — PROGRESS NOTES
Problem: Falls - Risk of  Goal: *Absence of Falls  Description: Document Raven Muse Fall Risk and appropriate interventions in the flowsheet.   Outcome: Progressing Towards Goal  Note: Fall Risk Interventions:  Mobility Interventions: PT Consult for assist device competence, PT Consult for mobility concerns, Patient to call before getting OOB, OT consult for ADLs, Bed/chair exit alarm    Mentation Interventions: Door open when patient unattended    Medication Interventions: Teach patient to arise slowly, Bed/chair exit alarm, Patient to call before getting OOB    Elimination Interventions: Call light in reach, Patient to call for help with toileting needs    History of Falls Interventions: Bed/chair exit alarm, Door open when patient unattended, Evaluate medications/consider consulting pharmacy, Room close to nurse's station

## 2022-10-03 NOTE — PROGRESS NOTES
Transition of Care Plan  RUR- 15%   DISPOSITION: SNF- [facility]  SNF Acceptance: [facility] Received;   Call Report: [phone]/ #[phone]  Per [facility] admissions liaison [phone]; they can take the pt tomorrow. Packet Completed on Chart   OP Dialysis: MWF at 12:30pm Noel Goss, [address] [facility]  F/U with PCP/Specialist    Transport: [facility] 3:30pm       Transition of Care Plan to SNF/Rehab    SNF/Rehab Transition:  Patient has been accepted to [facility] and meets criteria for admission. Patient will transported by [facility] and expected to leave at 3:30pm.    Communication to Patient/Family:  Met with patient  and they are agreeable to the transition plan. Communication to SNF/Rehab:  Bedside RN, Carol Aponte, has been notified to update the transition plan to the facility and call report (phone number [phone]). Discharge information has been updated on the AVS.       Nursing Please include all hard scripts for controlled substances, med rec and dc summary, and AVS in packet. Reviewed and confirmed with facility, can manage the patient care needs for the following:     SNF/Rehab Transition:  Patient to follow-up with Home Health: [facility], other none)  PCP/Specialist:     Reviewed and confirmed with facility,they can manage the patient care needs for the following:     Herman Andrade with (X) only those applicable:    Medication:  [x]  Medications will be available at the facility  []  IV Antibiotics  []  Controlled Substance - hard copy to be sent with patient   []  Weekly Labs   Documents:  [x] Hard RX  [x] MAR  [x] Kardex  [x] AVS  [x]Transfer Summary  [x]Discharge   Equipment:  []  CPAP/BiPAP  []  Wound Vacuum  []  Holloway or Urinary Device  []  PICC/Central Line  []  Nebulizer  []  Ventilator   Treatment:  []Isolation (for MRSA, VRE, etc.)  []Surgical Drain Management  []Tracheostomy Care  []Dressing Changes  [x]Dialysis with transportation and chair time.  MWF [facility] 12:30pm; Facility to provide transport to and from HD via their personal transport van.   []PEG Care  []Oxygen  []Daily Weights for Heart Failure   Dietary:  []Any diet limitations  []Tube Feedings   []Total Parenteral Management (TPN)   Eligible for Medicaid Long Term Services and Supports  Yes:  [] Eligible for medical assistance or will become eligible within 180 days and UAI completed. [] Provider/Patient and/or support system has requested screening. [] UAI copy provided to patient or responsible party,.  [] UAI unavailable at discharge will send once processed to SNF provider. [] UAI unavailable at discharged mailed to patient  No:   [x] Private pay and is not financially eligible for Medicaid within the next 180 days. [] Reside out-of-state.   [] A residents of a state owned/operated facility that is licensed  by [address] and [employer] or Merged with [facility]  [] Enrollment in [facility] hospice services  [] [address] [place] [address] [facility]  [] Patient /Family declines to have screening completed or provide financial information for screening     Financial Resources:  Medicaid    [] Initiated and application pending   [] Full coverage     Advanced Care Plan:  []Surrogate Decision Maker of Care  []POA  [x]Communicated Code Status  \"Full\")    Other     CM: Lucila Whitten. MSW,   [phone]

## 2022-10-03 NOTE — PROGRESS NOTES
Problem: Falls - Risk of  Goal: *Absence of Falls  Description: Document Raven Micha Fall Risk and appropriate interventions in the flowsheet. Outcome: Progressing Towards Goal  Note: Fall Risk Interventions:  Mobility Interventions: Bed/chair exit alarm    Mentation Interventions: Door open when patient unattended    Medication Interventions: Teach patient to arise slowly    Elimination Interventions: Call light in reach    History of Falls Interventions: Evaluate medications/consider consulting pharmacy         Problem: Patient Education: Go to Patient Education Activity  Goal: Patient/Family Education  Outcome: Progressing Towards Goal     Problem: Pain  Goal: *Control of Pain  Outcome: Progressing Towards Goal  Goal: *PALLIATIVE CARE:  Alleviation of Pain  Outcome: Progressing Towards Goal     Problem: Patient Education: Go to Patient Education Activity  Goal: Patient/Family Education  Outcome: Progressing Towards Goal     Problem: Pressure Injury - Risk of  Goal: *Prevention of pressure injury  Description: Document See Scale and appropriate interventions in the flowsheet. Outcome: Progressing Towards Goal  Note: Pressure Injury Interventions:  Sensory Interventions: Assess changes in LOC    Moisture Interventions: Check for incontinence Q2 hours and as needed    Activity Interventions: PT/OT evaluation    Mobility Interventions: HOB 30 degrees or less    Nutrition Interventions: Discuss nutritional consult with provider    Friction and Shear Interventions: Minimize layers                Problem: Patient Education: Go to Patient Education Activity  Goal: Patient/Family Education  Outcome: Progressing Towards Goal     Problem: Diabetes Self-Management  Goal: *Disease process and treatment process  Description: Define diabetes and identify own type of diabetes; list 3 options for treating diabetes.   Outcome: Progressing Towards Goal  Goal: *Incorporating nutritional management into lifestyle  Description: Describe effect of type, amount and timing of food on blood glucose; list 3 methods for planning meals. Outcome: Progressing Towards Goal  Goal: *Incorporating physical activity into lifestyle  Description: State effect of exercise on blood glucose levels. Outcome: Progressing Towards Goal  Goal: *Developing strategies to promote health/change behavior  Description: Define the ABC's of diabetes; identify appropriate screenings, schedule and personal plan for screenings. Outcome: Progressing Towards Goal  Goal: *Using medications safely  Description: State effect of diabetes medications on diabetes; name diabetes medication taking, action and side effects. Outcome: Progressing Towards Goal  Goal: *Monitoring blood glucose, interpreting and using results  Description: Identify recommended blood glucose targets  and personal targets. Outcome: Progressing Towards Goal  Goal: *Prevention, detection, treatment of acute complications  Description: List symptoms of hyper- and hypoglycemia; describe how to treat low blood sugar and actions for lowering  high blood glucose level. Outcome: Progressing Towards Goal  Goal: *Prevention, detection and treatment of chronic complications  Description: Define the natural course of diabetes and describe the relationship of blood glucose levels to long term complications of diabetes.   Outcome: Progressing Towards Goal  Goal: *Developing strategies to address psychosocial issues  Description: Describe feelings about living with diabetes; identify support needed and support network  Outcome: Progressing Towards Goal  Goal: *Insulin pump training  Outcome: Progressing Towards Goal  Goal: *Sick day guidelines  Outcome: Progressing Towards Goal  Goal: *Patient Specific Goal (EDIT GOAL, INSERT TEXT)  Outcome: Progressing Towards Goal     Problem: Patient Education: Go to Patient Education Activity  Goal: Patient/Family Education  Outcome: Progressing Towards Goal     Problem: Discharge Planning  Goal: *Discharge to safe environment  Outcome: Progressing Towards Goal  Goal: *Knowledge of medication management  Outcome: Progressing Towards Goal  Goal: *Knowledge of discharge instructions  Outcome: Progressing Towards Goal     Problem: Patient Education: Go to Patient Education Activity  Goal: Patient/Family Education  Outcome: Progressing Towards Goal     Problem: Patient Education: Go to Patient Education Activity  Goal: Patient/Family Education  Outcome: Progressing Towards Goal     Problem: Patient Education: Go to Patient Education Activity  Goal: Patient/Family Education  Outcome: Progressing Towards Goal

## 2022-10-03 NOTE — PROGRESS NOTES
Report was called to Scott Saravia at [place]. Review of SBAR and MAR was done time Time for questions and clarification was given. Lines were removed from patient.  Patient awaiting transport with [facility]

## 2022-10-03 NOTE — PROCEDURES
Hemodialysis / [phone]    Vitals Pre Post Assessment Pre Post   BP BP: (!) 122/59 (10/03/22 0949)   140/62 LOC A&Ox4, cooperative, follows commands A&Ox4, cooperative, follows commands   HR Pulse (Heart Rate): 70 (10/03/22 1000) 81 Lungs CTA bilaterally, denies shortness of breath CTA bilaterally, denies shortness of breath   Resp Resp Rate: 18 (10/03/22 0949) 18 Cardiac Regular, S1, S2, denies chest pain Regular, S1, S2, denies chest pain   Temp Temp: 97.8 °F (36.6 °C) (10/03/22 0949) 98.4   Skin LLE wound, R IJ permacath LLE wound, R IJ permacath   Weight Pre-Dialysis Weight: 135.3 kg (298 lb 4.5 oz) (09/23/22 0820)  Edema Generalized 1-2+ Generalized 1-2+   Tele status N/A N/A Pain Pain Intensity 1: 0 (10/03/22 0911) 0     Orders   Duration: Start: 0949 End: 1415 Total: 4 hours   Dialyzer: Dialyzer/Set Up Inspection: Revaclear (10/03/22 0949)   K Bath: Dialysate K (mEq/L): 2 (10/03/22 0949)   Ca Bath: Dialysate CA (mEq/L): 2.5 (10/03/22 0949)   Na: Dialysate NA (mEq/L): 140 (10/03/22 0949)   Bicarb: Dialysate HCO3 (mEq/L): 35 (10/03/22 0949)   Target Fluid Removal: Goal/Amount of Fluid to Remove (mL): 4000 mL (10/03/22 0949)     Access   Type & Location: R IJ PC : Dressing clean, dry, loose. No s/s of infection. New sterile dressing applied today, 10/3/22. Both lumens aspirate & flush well. Running well at .         Comments:                                        Labs   HBsAg (Antigen) / date: 9/16/22 Negative                                              HBsAb (Antibody) / date: 09/16/22 IMMUNE   Source:    Obtained/Reviewed  Critical Results Called HGB   Date Value Ref Range Status   10/03/2022 9.5 (L) 12.1 - 17.0 g/dL Final     Potassium   Date Value Ref Range Status   10/03/2022 4.4 3.5 - 5.1 mmol/L Final     Comment:     SPECIMEN HEMOLYZED, RESULTS MAY BE AFFECTED     Calcium   Date Value Ref Range Status   10/03/2022 9.9 8.5 - 10.1 MG/DL Final     BUN   Date Value Ref Range Status   10/03/2022 61 (H) 6 - 20 MG/DL Final     Creatinine   Date Value Ref Range Status   10/03/2022 7.72 (H) 0.70 - 1.30 MG/DL Final     Comment:     INVESTIGATED PER DELTA CHECK PROTOCOL        Meds Given   Name Dose Route   Albumin 25% 25 g  IV   Heparin 1:1000 2200 Units venous  2100 Units arterial  IV hep lock          Adequacy / Fluid    Total Liters Process: 89.4 L   Net Fluid Removed: 1000 mL (symptomatic hypotension during treatment)      Comments   Time Out Done:   (Time) @ 0930   Admitting Diagnosis: Left ankle fracture   Consent obtained/signed: Informed Consent Verified: Yes (10/03/22 0959)   Machine / Berdie Senters # Machine Number: [id number] (10/03/22 7989)   Primary Nurse Rpt Pre: Jaleesa Collado RN   Primary Nurse Rpt Post: Jaleesa Collado RN   Pt Education: Ordered Dialysis Treatment   Care Plan: Catheter Infection Prevention   Pts outpatient clinic: N/A     Tx Summary   Comments:                    SBAR received from Primary RN. Pt arrived to HD suite A&Ox4. Consent signed & on file. 3899: Each catheter limb disinfected per p&p, caps removed, hubs disinfected per p&p. Each lumen aspirated for blood return and flushed with Normal Saline per policy. VSS. Dialysis Tx initiated. 1045: , pt complaining of feeling lightheaded nauseas, 200 mL NS bolus given, UF goal modified for patient tolerance. Albumin administered per MAR. Nausea medicine given per [facility] after informed primary RN of pt nausea. 1055: Dialyzer clotted, TMP positive, treatment paused, lines flushed with normal saline, all possible blood rinsed back to patient. New setup and prime performed, treatment restarted at 1117 with continuation of Albumin. 1200: SBP mid to upper 80's, UF goal modified, 100 mL NS bolus given. 1230: SBP remains in mid 80's, pt continues to have complaints of lightheadedness, responds to questions appropriately; UF paused for pt tolerance. 1315: SBP mid 80's, UF remains off. Pt states \"his head feels okay\". 1415: Tx ended. VSS. Each dialysis catheter limb disinfected per p&p, all possible blood returned per p&p, and each dialysis hub disinfected per p&p. Each lumen flushed, post dialysis catheter Heparin dwell instilled per order, and caps applied. Bed locked and in the lowest position, call bell and belongings in reach. SBAR given to Primary, RN. Patient is stable at time of their departure. All Dialysis related medications have been reviewed.

## 2022-10-03 NOTE — PROGRESS NOTES
Pharmacist Note - Warfarin Dosing  Consult provided for this 62 y.o.male to manage warfarin for Atrial Fibrillation    INR Goal: 2 - 3    Home regimen/ tablet size: N/A - new start    Drugs that may increase INR: None  Drugs that may decrease INR: None  Other current anticoagulants/ drugs that may increase bleeding risk: Heparin  Risk factors: None  Daily INR ordered: YES    Recent Labs     10/03/22  1133 10/03/22  0309 10/02/22  0035 10/01/22  0346   HGB  --  9.5* 9.7* 10.2*   INR 1.1  --   --   --      Date               INR                  Dose  10/3   1.1      5 mg                                                                                Assessment/ Plan: Will order warfarin 5 mg PO x 1 dose. Pharmacy will continue to monitor daily and adjust therapy as indicated. Please contact the pharmacist at x [phone] or  for outpatient recommendations if needed.

## 2022-10-11 ENCOUNTER — OFFICE VISIT (OUTPATIENT)
Dept: ORTHOPEDIC SURGERY | Age: 62
End: 2022-10-11
Payer: MEDICARE

## 2022-10-11 VITALS — WEIGHT: 308 LBS | BODY MASS INDEX: 40.82 KG/M2 | HEIGHT: 73 IN

## 2022-10-11 DIAGNOSIS — S82.852D LEFT TRIMALLEOLAR FRACTURE, CLOSED, WITH ROUTINE HEALING, SUBSEQUENT ENCOUNTER: ICD-10-CM

## 2022-10-11 DIAGNOSIS — E11.8 TYPE II DIABETES MELLITUS WITH COMPLICATION (HCC): ICD-10-CM

## 2022-10-11 DIAGNOSIS — Z09 SURGICAL FOLLOW-UP CARE: Primary | ICD-10-CM

## 2022-10-11 PROCEDURE — 99024 POSTOP FOLLOW-UP VISIT: CPT | Performed by: ORTHOPAEDIC SURGERY

## 2022-10-11 NOTE — LETTER
10/12/2022    Patient: Madeleine Zapata   YOB: 1960   Date of Visit: 10/11/2022     Donny Carrera MD  [address] [address]  Via Fax: [phone]    Dear Donny Carrera MD,      Thank you for referring Mr. Madeleine Zapata to [facility] for evaluation. My notes for this consultation are attached. If you have questions, please do not hesitate to call me. I look forward to following your patient along with you.       Sincerely,    Mo Avila MD

## 2022-10-11 NOTE — PROGRESS NOTES
Oscar Herron (: 1960) is a 58 y.o. male, patient,here for evaluation of the following   Chief Complaint   Patient presents with    Ankle Pain    Surgical Follow-up        ASSESSMENT/PLAN:  Below is the assessment and plan developed based on review of pertinent history, physical exam, labs, studies, and medications. 1. Surgical follow-up care  -     XR ANKLE LT MIN 3 V; Future  2. Left trimalleolar fracture, closed, with routine healing, subsequent encounter  3. Type II diabetes mellitus with complication [facility])      Patient informed of findings on exam and x-rays today. The x-rays were not great because he is not mobile and we can position his ankle properly, what we did obtain shows an overall good alignment and the implants has remained intact without change in position. The ankle mortise is stable. The incision sites are checked, local wound care made, some of the staples and sutures removed today and a few left intact. We put him back in a well-padded sugar-tong splint but informed the nursing home to keep his heel well-padded and off the bed to avoid pressure ulcers. He will keep the splint clean dry and intact. He will continue nonweightbearing, okay to do range of motion for the knees and rest of extremities. He is informed he will likely be nonweightbearing for almost up to 3 months, he is diabetic and has poor potential to heal and potential for Charcot arthropathy and failure of this fixation or implant. Patient will return to see me at 2 weeks at [place] office to reevaluate his skin. Any remaining sutures or staples will be removed. If he is doing much better, we may be able to transition to a boot. At next visit we will try to get better x-rays than what we obtained today, left ankle 3 views nonweightbearing. Return in about 2 weeks (around 10/25/2022) for at [facility], repeat xrays.       Allergies   Allergen Reactions    Adhesive Rash     Paper tape ok    Bactrim [Sulfamethoprim] Rash       Current Outpatient Medications   Medication Sig    gabapentin (NEURONTIN) 300 mg capsule Take 1 Capsule by mouth DIALYSIS MON, WED & FRI. Max Daily Amount: 300 mg.    warfarin (COUMADIN) 5 mg tablet Take 1 Tablet by mouth daily. If crushing required, wear gloves and use a pill  with self contained pouches. Pregnant staff should wear gloves when handling. Daily INR for goal INR of 2-3 for AFIB.    tamsulosin (FLOMAX) 0.4 mg capsule Take 2 Capsules by mouth daily. senna-docusate (PERICOLACE) 8.6-50 mg per tablet Take 2 Tablets by mouth daily. polyethylene glycol (MIRALAX) 17 gram packet Take 1 Packet by mouth daily. ondansetron (ZOFRAN ODT) 4 mg disintegrating tablet Take 1 Tablet by mouth every eight (8) hours as needed for Nausea or Vomiting.    metoprolol tartrate (LOPRESSOR) 25 mg tablet Take 1 Tablet by mouth every twelve (12) hours. losartan (COZAAR) 50 mg tablet Take 1 Tablet by mouth daily. epoetin rico-epbx (RETACRIT) 20,000 unit/2 mL injection 2 mL by SubCUTAneous route every Monday, Wednesday, Friday. Indications: anemia due to kidney failure    bisacodyL (DULCOLAX) 10 mg supp Insert 10 mg into rectum daily as needed for Constipation. acetaminophen (TYLENOL) 325 mg tablet Take 2 Tablets by mouth every four (4) hours as needed for Pain or Fever. rosuvastatin (CRESTOR) 10 mg tablet Take 1 Tablet by mouth nightly. hydrOXYzine HCL (ATARAX) 25 mg tablet Take  mg by mouth every eight (8) hours as needed for Anxiety or Sleep. insulin aspart U-100 (NOVOLOG) 100 unit/mL (3 mL) inpn 10 Units by SubCUTAneous route Before breakfast, lunch, and dinner. insulin CONCENTRATED regular (HumuLIN R U-500) 500 unit/mL soln 175 Units by SubCUTAneous route Every morning. insulin CONCENTRATED regular (HumuLIN R U-500) 500 unit/mL soln 80 Units by SubCUTAneous route every evening.     levothyroxine (SYNTHROID) 25 mcg tablet Take 25 mcg by mouth Daily (before breakfast). No current facility-administered medications for this visit. Past Medical History:   Diagnosis Date    Anemia     Chronic kidney disease     Diabetes ([facility] [address]     Dorsalgia     ESRD on hemodialysis ([facility] [address]     Gastro-esophageal reflux     Heart failure (H[facility])     Hypertension     Obesity     Polyneuropathy     Sciatica        No past surgical history on file. No family history on file. Social History     Socioeconomic History    Marital status: SINGLE     Spouse name: Not on file    Number of children: Not on file    Years of education: Not on file    Highest education level: Not on file   Occupational History    Not on file   Tobacco Use    Smoking status: Every Day     Packs/day: 0.50     Types: Cigarettes    Smokeless tobacco: Never   Substance and Sexual Activity    Alcohol use: Not on file    Drug use: Not on file    Sexual activity: Not on file   Other Topics Concern    Not on file   Social History Narrative    Not on file     Social Determinants of Health     Financial Resource Strain: Not on file   Food Insecurity: Not on file   Transportation Needs: Not on file   Physical Activity: Not on file   Stress: Not on file   Social Connections: Not on file   Intimate Partner Violence: Not on file   Housing Stability: Not on file           Vitals:  Ht 6' 1\" (1.854 m)   Wt 308 lb (139.7 kg)   BMI 40.64 kg/m²    Body mass index is 40.64 kg/m². SUBJECTIVE:  Jhonny Antonio (: 1960)   Patient is here for first follow-up since surgery performed about 3 weeks ago. He underwent surgical fixation of a left ankle fracture initially diagnosed as a bimalleolar but intraoperatively noted this is a trimalleolar ankle fracture. The orthopedic procedure was completed on 2022. Patient then was admitted for some time and underwent another procedure by vascular surgeon for a left upper arm cephalic vein transposition fistula.   He has significant medical history of diabetes mellitus with complications. [facility], he is a current resident there status post discharge from hospital.  He states he has not put any weight on his extremity which was recommended. He denies chest pain, shortness of breath, calf pain or swelling. OBJECTIVE EXAM:     Visit Vitals  Ht 6' 1\" (1.854 m)   Wt 308 lb (139.7 kg)   BMI 40.64 kg/m²       Appearance: Alert, well appearing and pleasant patient who is in no distress, oriented to person, place/time, and who follows commands. This patient is accompanied in the       office by his  self and nursing home transport staff . Psychiatric: Affect and mood are appropriate. No dementia noted on examination  Musculoskeletal:  LOCATION: Mild diffuse tenderness left lower extremity and ankle - left      Integumentary: No rashes, skin patches, wounds, or abrasions to the right or left legs       Warm and normal color. No regions of expressible drainage. Gait: Normal      Tenderness: No tenderness        Motor/Strength/Tone Exam: Normal       Sensory Exam:   Intact Normal Sensation to ankle/foot      Stability Testing: No anterolateral or varus instability of the Ankle or Subtalar Joints               No peroneal tendon instability noted      ROM: Normal ROM noted to ankle/foot      Contractures: No Achilles or Gastrocnemius Contractures      Calf tenderness: Absent for calf or gastrocnemius muscle regions       Soft, supple, non tender, non taut lower extremity compartment  Alignment:      NEUTRAL Hindfoot,         none Metatarsus Adductus Metatarsus   Wounds/Abrasions:    None present  Extremities:   No embolic phenomena to the toes          No significant edema to the foot and or toes.         Lower extremities are warm and appear well perfused    DVT: No evidence of DVT seen on examination at this time     No calf swelling, no tenderness to calf muscles  Lymphatic:  No Evidence of Lymphedema  Vascular: Medial Border of Tibia Region: Edema is not present         Pulses: Dorsalis Pedis &  Posterior Tibial Pulses : Palpable yes        Varicosities Lower Limbs :  None  noted  Neuro: Negative bilateral Straight leg raise (seated position)    See Musculoskeletal section for pertinent individual extremity examination    No abnormal hand/wrist, foot/ankle, or facial/neck tremors. Lower Extremity/Ankle/Foot:  Nonweightbearing gait, limited weightbearing stance. Patient is supine in a transport bed. Left lower extremity/ankle: Calves are soft nontender, there is is a healing wound on the medial aspect of ankle where there was an abrasion noted prior to surgical procedure. On the lateral side there is healing incision site, the incision not fully healed. Left foot: Foot has mild swelling but mostly nontender on exam, no surgical incisions present, seems Paige 5.07 filament testing shows some underlying neuropathy, there are no open wounds or lesions to the foot and the toes are intact, the foot is warm, satisfactory capillary refill. Contralateral lower extremity/ankle /foot exam:  Nontender, no swelling ligaments grossly stable. Normal weightbearing stance. Neurovascular exam is grossly intact for range of motion, strength for plantarflexion dorsiflexion near 5/5, dorsalis pedis pulse difficult to palpate. Sensation decreased related to diabetes some underlying neuropathy. Imaging:    XR Results (most recent):  Results from Appointment encounter on 10/11/22    XR ANKLE LT MIN 3 V    Narrative  Left ankle attempted AP, lateral and oblique x-rays reviewed, not able to get a good views on this patient as he is in a gurney and not helpful in x-rays and radiology tech unable to get the lateral view, x-rays show overall satisfactory alignment, difficult to see if the fractures are healing but implants are intact. An electronic signature was used to authenticate this note.   -- Raissa Gillespie MD

## 2022-10-27 ENCOUNTER — OFFICE VISIT (OUTPATIENT)
Dept: ORTHOPEDIC SURGERY | Age: 62
End: 2022-10-27
Payer: MEDICARE

## 2022-10-27 VITALS — HEIGHT: 73 IN | WEIGHT: 308 LBS | BODY MASS INDEX: 40.82 KG/M2

## 2022-10-27 DIAGNOSIS — Z79.4 TYPE 2 DIABETES MELLITUS WITH DIABETIC NEPHROPATHY, WITH LONG-TERM CURRENT USE OF INSULIN (HCC): ICD-10-CM

## 2022-10-27 DIAGNOSIS — S91.002A ANKLE WOUND, LEFT, INITIAL ENCOUNTER: ICD-10-CM

## 2022-10-27 DIAGNOSIS — S82.852D LEFT TRIMALLEOLAR FRACTURE, CLOSED, WITH ROUTINE HEALING, SUBSEQUENT ENCOUNTER: ICD-10-CM

## 2022-10-27 DIAGNOSIS — Z09 SURGICAL FOLLOW-UP CARE: Primary | ICD-10-CM

## 2022-10-27 DIAGNOSIS — E11.21 TYPE 2 DIABETES MELLITUS WITH DIABETIC NEPHROPATHY, WITH LONG-TERM CURRENT USE OF INSULIN (HCC): ICD-10-CM

## 2022-10-27 PROCEDURE — L4387 NON-PNEUM WALK BOOT PRE OTS: HCPCS | Performed by: ORTHOPAEDIC SURGERY

## 2022-10-27 PROCEDURE — 99024 POSTOP FOLLOW-UP VISIT: CPT | Performed by: ORTHOPAEDIC SURGERY

## 2022-10-27 NOTE — LETTER
10/31/2022    Patient: Rosalia Robert   YOB: 1960   Date of Visit: 10/27/2022     Abdoulaye Reyna MD  [address] [address]  Via Fax: [phone]    Dear Abdoulaye Reyna MD,      Thank you for referring Mr. Rosalia Robert to [facility] for evaluation. My notes for this consultation are attached. If you have questions, please do not hesitate to call me. I look forward to following your patient along with you.       Sincerely,    Arnulfo Wetzel MD

## 2022-10-27 NOTE — PROGRESS NOTES
Madeleine Zapata (: 1960) is a 58 y.o. male, patient,here for evaluation of the following   Chief Complaint   Patient presents with    Ankle Pain        ASSESSMENT/PLAN:  Below is the assessment and plan developed based on review of pertinent history, physical exam, labs, studies, and medications. 1. Surgical follow-up care  -     XR ANKLE LT MIN 3 V; Future  -     REFERRAL TO DME  -     [facility] NON-PNEUM WALK BOOT PRE OTS  2. Ankle wound, left, initial encounter  -     REFERRAL TO WOUND CARE  3. Left trimalleolar fracture, closed, with routine healing, subsequent encounter  -     XR ANKLE LT MIN 3 V; Future  -     REFERRAL TO DME  -     NH NON-PNEUM WALK BOOT PRE OTS  -     REFERRAL TO WOUND CARE  4. Type 2 diabetes mellitus with diabetic nephropathy, with long-term current use of insulin ([facility] [address]  -     REFERRAL TO WOUND CARE      Patient is healing but not complete, he is only 4 weeks since date of surgery and nowhere near weightbearing at this time. He is diabetic and anticipate a long recovery up to about 4 months prior to starting to walk again. He does have a lateral wound superficially and so I recommended a wound care consultation for this problem. Local wound care provided today and new dressings were applied including padding to avoid friction to the area. He is in a boot and will be transition back into the boot. Again no weightbearing at all at this time. He is not to sleep in the boot to avoid any pressure wounds. He should continue to have protection of his heel so he does not develop an ulcer. I recommend physical therapy for gentle range of motion of ankle, knees, hips and overall strength program but no weightbearing for the next 2 to 3 months. Next time he returns we will get new x-rays of the left ankle 3 views nonweightbearing. Informed the patient if any worsening problems with the wound he should return sooner.   In the meantime I am hoping to get him into wound care specialist to take care of this wound. Return in about 4 weeks (around 11/24/2022) for repeat xrays. Allergies   Allergen Reactions    Adhesive Rash     Paper tape ok    Bactrim [Sulfamethoprim] Rash       Current Outpatient Medications   Medication Sig    gabapentin (NEURONTIN) 300 mg capsule Take 1 Capsule by mouth DIALYSIS MON, WED & FRI. Max Daily Amount: 300 mg.    warfarin (COUMADIN) 5 mg tablet Take 1 Tablet by mouth daily. If crushing required, wear gloves and use a pill  with self contained pouches. Pregnant staff should wear gloves when handling. Daily INR for goal INR of 2-3 for AFIB.    tamsulosin (FLOMAX) 0.4 mg capsule Take 2 Capsules by mouth daily. senna-docusate (PERICOLACE) 8.6-50 mg per tablet Take 2 Tablets by mouth daily. polyethylene glycol (MIRALAX) 17 gram packet Take 1 Packet by mouth daily. ondansetron (ZOFRAN ODT) 4 mg disintegrating tablet Take 1 Tablet by mouth every eight (8) hours as needed for Nausea or Vomiting.    metoprolol tartrate (LOPRESSOR) 25 mg tablet Take 1 Tablet by mouth every twelve (12) hours. losartan (COZAAR) 50 mg tablet Take 1 Tablet by mouth daily. epoetin rico-epbx (RETACRIT) 20,000 unit/2 mL injection 2 mL by SubCUTAneous route every Monday, Wednesday, Friday. Indications: anemia due to kidney failure    bisacodyL (DULCOLAX) 10 mg supp Insert 10 mg into rectum daily as needed for Constipation. acetaminophen (TYLENOL) 325 mg tablet Take 2 Tablets by mouth every four (4) hours as needed for Pain or Fever. rosuvastatin (CRESTOR) 10 mg tablet Take 1 Tablet by mouth nightly. hydrOXYzine HCL (ATARAX) 25 mg tablet Take  mg by mouth every eight (8) hours as needed for Anxiety or Sleep. insulin aspart U-100 (NOVOLOG) 100 unit/mL (3 mL) inpn 10 Units by SubCUTAneous route Before breakfast, lunch, and dinner. insulin CONCENTRATED regular (HumuLIN R U-500) 500 unit/mL soln 175 Units by SubCUTAneous route Every morning.     insulin CONCENTRATED regular (HumuLIN R U-500) 500 unit/mL soln 80 Units by SubCUTAneous route every evening. levothyroxine (SYNTHROID) 25 mcg tablet Take 25 mcg by mouth Daily (before breakfast). No current facility-administered medications for this visit. Past Medical History:   Diagnosis Date    Anemia     Chronic kidney disease     Diabetes ([facility] [address]     Dorsalgia     ESRD on hemodialysis ([facility] [address]     Gastro-esophageal reflux     Heart failure (HCC)     Hypertension     Obesity     Polyneuropathy     Sciatica        No past surgical history on file. No family history on file. Social History     Socioeconomic History    Marital status: SINGLE     Spouse name: Not on file    Number of children: Not on file    Years of education: Not on file    Highest education level: Not on file   Occupational History    Not on file   Tobacco Use    Smoking status: Every Day     Packs/day: 0.50     Types: Cigarettes    Smokeless tobacco: Never   Substance and Sexual Activity    Alcohol use: Not on file    Drug use: Not on file    Sexual activity: Not on file   Other Topics Concern    Not on file   Social History Narrative    Not on file     Social Determinants of Health     Financial Resource Strain: Not on file   Food Insecurity: Not on file   Transportation Needs: Not on file   Physical Activity: Not on file   Stress: Not on file   Social Connections: Not on file   Intimate Partner Violence: Not on file   Housing Stability: Not on file           Vitals:  Ht 6' 1\" (1.854 m)   Wt 308 lb (139.7 kg)   BMI 40.64 kg/m²    Body mass index is 40.64 kg/m². SUBJECTIVE:  Fernanda Rivera (: 1960) patient is back for reevaluation of the left ankle where he had a trimalleolar ankle fracture and underwent surgical treatment 2022. He has had prolonged recovery as he has other underlying medical conditions that really have debilitated his overall function.   He is diabetic with neuropathy and kidney disease. He is doing okay in regards to the left ankle, he has no complaints he denies fevers or chills. OBJECTIVE EXAM:     Visit Vitals  Ht 6' 1\" (1.854 m)   Wt 308 lb (139.7 kg)   BMI 40.64 kg/m²       Appearance: Alert, well appearing and pleasant patient who is in no distress, oriented to person, place/time, and who follows commands. This patient is accompanied in the       office by his  self. Psychiatric: Affect and mood are appropriate. No dementia noted on examination  Musculoskeletal:  LOCATION: There is still swelling and some tenderness with deep palpation ankle - left      Integumentary: No rashes, skin patches, wounds, or abrasions to the right or left legs       Warm and normal color. No regions of expressible drainage. Gait: Normal      Tenderness: No tenderness        Motor/Strength/Tone Exam: Normal       Sensory Exam:   Intact Normal Sensation to ankle/foot      Stability Testing: No anterolateral or varus instability of the Ankle or Subtalar Joints               No peroneal tendon instability noted      ROM: Normal ROM noted to ankle/foot      Contractures: No Achilles or Gastrocnemius Contractures      Calf tenderness: Absent for calf or gastrocnemius muscle regions       Soft, supple, non tender, non taut lower extremity compartment  Alignment:      NEUTRAL Hindfoot,         none Metatarsus Adductus Metatarsus   Wounds/Abrasions:    None present  Extremities:   No embolic phenomena to the toes          No significant edema to the foot and or toes.         Lower extremities are warm and appear well perfused    DVT: No evidence of DVT seen on examination at this time     No calf swelling, no tenderness to calf muscles  Lymphatic:  No Evidence of Lymphedema  Vascular: Medial Border of Tibia Region: Edema is not present         Pulses: Dorsalis Pedis &  Posterior Tibial Pulses : Palpable yes        Varicosities Lower Limbs :  None  noted  Neuro: Negative bilateral Straight leg raise (seated position)    See Musculoskeletal section for pertinent individual extremity examination    No abnormal hand/wrist, foot/ankle, or facial/neck tremors. Lower Extremity/Ankle/Foot:  Nonweightbearing gait, limited weightbearing stance. Left lower extremity/ankle: Overall alignment of the ankle is good and there is improvement in terms of swelling and most of the incision is healed except at the lateral incision there is some superficial wound that still is very tender with deep palpation and does not appear to be deep and I cannot see the plates or screws, there is no odor to the wound. Left foot: Nontender, no swelling, the heel is without open wounds or lesions. Contralateral lower extremity/ankle /foot exam:  Nontender, no swelling ligaments grossly stable. Normal weightbearing stance. Neurovascular exam shows decreased sensation secondary to peripheral neuropathy confirmed by Reynold Dakota 5.07 filament Testing, dorsalis pedis pulses not easily palpable, capillary refill is present. Imaging:    XR Results (most recent):  Results from Appointment encounter on 10/27/22    XR ANKLE LT MIN 3 V    Narrative  Left ankle AP, lateral and oblique nonweightbearing x-rays show the implants remain intact the fracture is not 100% healed but it is still well aligned, most of the nonhealing is at the medial malleolus, implants remain intact. Ankle mortise maintained. An electronic signature was used to authenticate this note.   -- Katelyn Cook MD

## 2022-11-17 ENCOUNTER — OFFICE VISIT (OUTPATIENT)
Dept: CARDIOLOGY CLINIC | Age: 62
End: 2022-11-17
Payer: MEDICARE

## 2022-11-17 VITALS
OXYGEN SATURATION: 98 % | WEIGHT: 308 LBS | BODY MASS INDEX: 40.82 KG/M2 | DIASTOLIC BLOOD PRESSURE: 70 MMHG | SYSTOLIC BLOOD PRESSURE: 140 MMHG | HEART RATE: 73 BPM | RESPIRATION RATE: 16 BRPM | HEIGHT: 73 IN

## 2022-11-17 DIAGNOSIS — R55 SYNCOPE, UNSPECIFIED SYNCOPE TYPE: ICD-10-CM

## 2022-11-17 DIAGNOSIS — I48.19 PERSISTENT ATRIAL FIBRILLATION (HCC): Primary | ICD-10-CM

## 2022-11-17 DIAGNOSIS — N18.6 ESRD (END STAGE RENAL DISEASE) ON DIALYSIS (HCC): ICD-10-CM

## 2022-11-17 DIAGNOSIS — I45.2 BIFASCICULAR BLOCK: ICD-10-CM

## 2022-11-17 DIAGNOSIS — I10 HYPERTENSION, ESSENTIAL: ICD-10-CM

## 2022-11-17 DIAGNOSIS — Z99.2 ESRD (END STAGE RENAL DISEASE) ON DIALYSIS (HCC): ICD-10-CM

## 2022-11-17 DIAGNOSIS — D63.8 ANEMIA, CHRONIC DISEASE: ICD-10-CM

## 2022-11-17 DIAGNOSIS — E11.9 TYPE 2 DIABETES MELLITUS WITHOUT COMPLICATION, UNSPECIFIED WHETHER LONG TERM INSULIN USE (HCC): ICD-10-CM

## 2022-11-17 PROCEDURE — G8510 SCR DEP NEG, NO PLAN REQD: HCPCS | Performed by: SPECIALIST

## 2022-11-17 PROCEDURE — 99215 OFFICE O/P EST HI 40 MIN: CPT | Performed by: SPECIALIST

## 2022-11-17 PROCEDURE — 3074F SYST BP LT 130 MM HG: CPT | Performed by: SPECIALIST

## 2022-11-17 PROCEDURE — 3078F DIAST BP <80 MM HG: CPT | Performed by: SPECIALIST

## 2022-11-17 PROCEDURE — 2022F DILAT RTA XM EVC RTNOPTHY: CPT | Performed by: SPECIALIST

## 2022-11-17 PROCEDURE — 3046F HEMOGLOBIN A1C LEVEL >9.0%: CPT | Performed by: SPECIALIST

## 2022-11-17 PROCEDURE — G8427 DOCREV CUR MEDS BY ELIG CLIN: HCPCS | Performed by: SPECIALIST

## 2022-11-17 PROCEDURE — 3017F COLORECTAL CA SCREEN DOC REV: CPT | Performed by: SPECIALIST

## 2022-11-17 PROCEDURE — 93000 ELECTROCARDIOGRAM COMPLETE: CPT | Performed by: SPECIALIST

## 2022-11-17 PROCEDURE — G8417 CALC BMI ABV UP PARAM F/U: HCPCS | Performed by: SPECIALIST

## 2022-11-17 RX ORDER — APIXABAN 5 MG/1
TABLET, FILM COATED ORAL
COMMUNITY
Start: 2022-10-16

## 2022-11-17 RX ORDER — INSULIN GLARGINE 100 [IU]/ML
INJECTION, SOLUTION SUBCUTANEOUS
COMMUNITY
Start: 2022-10-30

## 2022-11-17 RX ORDER — OXYCODONE HYDROCHLORIDE 5 MG/1
TABLET ORAL
COMMUNITY
Start: 2022-09-02

## 2022-11-17 RX ORDER — AMIODARONE HYDROCHLORIDE 200 MG/1
TABLET ORAL
Qty: 180 TABLET | Refills: 3 | Status: SHIPPED | OUTPATIENT
Start: 2022-11-17

## 2022-11-17 RX ORDER — FERRIC CITRATE 210 MG/1
TABLET, COATED ORAL
COMMUNITY
Start: 2022-10-25

## 2022-11-17 RX ORDER — SIMETHICONE 80 MG
80 TABLET,CHEWABLE ORAL
COMMUNITY

## 2022-11-17 NOTE — PROGRESS NOTES
Madeleine Zapata     1960       Zi Byrd MD, [facility]  Date of Visit-11/17/2022   PCP is Jesus Jay MD   [facility] [place]  [facility]  HPI:  Madeleine Zapata is a 58 y.o. male   Hospital follow up Atrial fibrillation   Hospitalized September 16, 2022 to October 3, 2022 with ankle fracture and near syncope negative CT of the head   Echo with EF 50-55% and new onset atrial fibrillation on beta-blocker at discharge. Discharge summary and my consult note of 9/29/2022 is reviewed. Patient a past medical history of hypertension ,diabetes mellitus type 2 ,end-stage renal disease on hemodialysis, hypothyroidism ,morbid obesity  and reports a prior history of enlarged heart. He had presented on 9/16/2022 with fall left ankle fracture he underwent a LUE AV fistula placement during surgery was noted to be in atrial fibrillation with his initial EKG on admission being sinus rhythm this was a change. Telemetry and follow-up on 9/19/2022 showed him in sinus rhythm . He was placed on metoprolol ;his TSH was 0.25 ;his cChads -Vasc was 3. For his hypertension he was on losartan 100 mg at home was decreased to 50 at discharge. Needed sleep apnea study and has a right bundle branch block with left interfascicular block indicating bifascicular block   he was advised to stop smoking   he was noted to have anemia with a hemoglobin of 9.5   he had an ORIF status post left distal fibula fracture. Patient had appointment on 10/10/2022 for discharge but canceled. Patient has seen orthopedics for follow-up of his trimalleolar fracture seen Dr. Mo Avila attack orthopedics. Ortho note is reviewed they feel with wounds will heal slowly over 4 months before he can fully walk. ECG shows atrial fibrillation with controlled rate with right bundle branch block and left anterior hemiblock.     Notably he is on Rx E iron tablet repletion, Crestor ,metoprolol ,losartan and need to clarify if taking Eliquis or warfarin    Today. Nancy Branch He is in a stretcher with his left leg displaced in a cast.  He says he is not having any chest pain or palpitations. He is having occasional shortness of breath. He is will be discharged from his facility today and he did benefit from stay there. Confirms that he has been on Eliquis. There was some question whether he should be on warfarin. We will see what the cost is extremely difficult for him to come to warfarin clinic. He would have to probably have home health if he went to warfarin. I will give him samples        Key CAD CHF Meds               Eliquis 5 mg tablet (Taking)     metoprolol tartrate (LOPRESSOR) 25 mg tablet (Taking) Take 1 Tablet by mouth every twelve (12) hours. losartan (COZAAR) 50 mg tablet (Taking) Take 1 Tablet by mouth daily. rosuvastatin (CRESTOR) 10 mg tablet (Taking) Take 1 Tablet by mouth nightly. warfarin (COUMADIN) 5 mg tablet Take 1 Tablet by mouth daily. If crushing required, wear gloves and use a pill  with self contained pouches. Pregnant staff should wear gloves when handling. Daily INR for goal INR of 2-3 for AFIB. Assessment/Plan:       Patient Instructions   Please start Amiodarone 200mg twice a day for 14 days. After 14 days go to 200mg daily. We will see you back in 2 months with an echo, office visit and labs. 1. Persistent atrial fibrillation (HCC)  Atrial fibrillation rate is controlled it is going difficult to cardiovert him he may have to be seen by anesthesia and intubated we will try with amiodarone loading if he possibly converts with medication and continue the Eliquis. I will give him samples today if it is unaffordable we will switch him to warfarin and probably arrange for home nurse to check INR. His EF was preserved but difficult study I will repeat the echo when he comes back in 2 months.   Amiodarone 2 mg twice daily for 7 days 14 days then switch to 2 mg once a day get lab work when he comes back check when last chest x-ray. On [address]      - AMB POC EKG ROUTINE W/ 12 LEADS, INTER & REP  - ECHO ADULT COMPLETE; Future  - amiodarone (CORDARONE) 200 mg tablet; Take one tablet by mouth twice a day for 14 days. After 14 days take one tablet by mouth daily. Dispense: 180 Tablet; Refill: 3  - METABOLIC PANEL, COMPREHENSIVE; Future  - TSH 3RD GENERATION; Future  - CBC WITH AUTOMATED DIFF; Future  - METABOLIC PANEL, COMPREHENSIVE  - TSH 3RD GENERATION  - CBC WITH AUTOMATED DIFF    2. Syncope, unspecified syncope type  On admit with fracture, did he has multifactorial drop in BP or AFib with RVR? Had missed dialysis prior to episode and admit, perhaps volume overload to trigger AFib  No recurrence of syncope thus far    3. Hypertension, essential  Renal dz on BB ARB  At goal , meds and possible side effects reviewed and patient denies  Key CAD CHF Meds               Eliquis 5 mg tablet (Taking)     amiodarone (CORDARONE) 200 mg tablet (Taking) Take one tablet by mouth twice a day for 14 days. After 14 days take one tablet by mouth daily. metoprolol tartrate (LOPRESSOR) 25 mg tablet (Taking) Take 1 Tablet by mouth every twelve (12) hours. losartan (COZAAR) 50 mg tablet (Taking) Take 1 Tablet by mouth daily. rosuvastatin (CRESTOR) 10 mg tablet (Taking) Take 1 Tablet by mouth nightly. BP Readings from Last 6 Encounters:   11/17/22 (!) 140/70   10/03/22 130/81          4. Type 2 diabetes mellitus without complication, unspecified whether long term insulin use (HCC)  Statin for XOL- cardiovascular disease risk factors   At goal , denies excess muscle aches or new liver issues  Lab Results   Component Value Date/Time    Hemoglobin A1c 12.8 (H) 09/18/2022 01:23 AM      Key Antihyperlipidemia Meds               rosuvastatin (CRESTOR) 10 mg tablet (Taking) Take 1 Tablet by mouth nightly.            Lab Results   Component Value Date/Time    LDL, calculated 36.2 09/30/2022 01:00 AM         5. ESRD (end stage renal disease) on dialysis [facility])  Lab Results   Component Value Date/Time    Creatinine 7.72 (H) 10/03/2022 03:09 AM        6. Bifascicular block  7. Anemia, chronic disease     Lab Results   Component Value Date/Time    HGB 9.5 (L) 10/03/2022 03:09 AM         Impression:   1. Persistent atrial fibrillation ([address]    2. Syncope, unspecified syncope type    3. Hypertension, essential    4. Type 2 diabetes mellitus without complication, unspecified whether long term insulin use ([address]    5. ESRD (end stage renal disease) on dialysis ([facility] [address]    6. Bifascicular block    7. Anemia, chronic disease       Cardiac History:   No specialty comments available. Future Appointments   Date Time Provider [facility]   1/19/2023  1:00 PM ANIKA KHANNA [facility]   1/19/2023  2:20 PM MD CANDICE Velez [facility]      Patient Care Team:  Danielle Musa MD as PCP - General (Family Medicine)  Elmer Lucas MD as Consulting Provider (Cardiovascular Disease Physician)     ROS-except as noted above. . A complete cardiac and respiratory are reviewed and negative except as above ; Resp-denies wheezing  or productive cough,. Const- No unusual weight loss or fever; Neuro-no recent seizure or CVA ; GI- No BRBPR, abdom pain, bloating ; - no  hematuria   see supplement sheet, initialed and to be scanned by staff    Past Medical History:   Diagnosis Date    Anemia     Chronic kidney disease     Diabetes ([facility] [address]     Dorsalgia     ESRD on hemodialysis ([address]     Gastro-esophageal reflux     Heart failure ([address]     Hypertension     Obesity     Polyneuropathy     Sciatica       Social Hx= reports that he has been smoking cigarettes. He has been smoking an average of .5 packs per day. He has never used smokeless tobacco.   Family Hx- family history is not on file.    Allergies   Allergen Reactions    Adhesive Rash     Paper tape ok    Bactrim [Sulfamethoprim] Rash        Exam and Labs:  Visit Vitals  BP (!) 140/70   Pulse 73   Resp 16   Ht 6' 1\" (1.854 m)   Wt 308 lb (139.7 kg)   SpO2 98%   BMI 40.64 kg/m²    @Constitutional:  NAD, comfortable  Head: NC,AT. Eyes: No scleral icterus. Neck:  Neck supple. No JVD present. Throat: moist mucous membranes. Chest: Effort normal & normal respiratory excursion . Neurological: alert, conversant and oriented . Skin: Skin is not cold. No obvious systemic rash noted. Not diaphoretic. No erythema. Psychiatric:  Grossly normal mood and affect. Behavior appears normal. Extremities:  no clubbing or cyanosis. Abdomen: non distended    Lungs:breath sounds normal. No stridor. distress, wheezes or  Rales. Heart:    no murmurs noted, no gallops noted, irregularly irregular rhythm, no JVD , PMI non displaced.     Edema: as above      Lab Results   Component Value Date/Time    Cholesterol, total 80 09/30/2022 01:00 AM    HDL Cholesterol 20 09/30/2022 01:00 AM    LDL, calculated 36.2 09/30/2022 01:00 AM    Triglyceride 119 09/30/2022 01:00 AM    CHOL/HDL Ratio 4.0 09/30/2022 01:00 AM     Lab Results   Component Value Date/Time    Sodium 130 (L) 10/03/2022 03:09 AM    Potassium 4.4 10/03/2022 03:09 AM    Chloride 98 10/03/2022 03:09 AM    CO2 23 10/03/2022 03:09 AM    Anion gap 9 10/03/2022 03:09 AM    Glucose 118 (H) 10/03/2022 03:09 AM    BUN 61 (H) 10/03/2022 03:09 AM    Creatinine 7.72 (H) 10/03/2022 03:09 AM    BUN/Creatinine ratio 8 (L) 10/03/2022 03:09 AM    GFR est AA 9 (L) 10/03/2022 03:09 AM    GFR est non-AA 7 (L) 10/03/2022 03:09 AM    Calcium 9.9 10/03/2022 03:09 AM      Wt Readings from Last 3 Encounters:   11/17/22 308 lb (139.7 kg)   10/27/22 308 lb (139.7 kg)   10/11/22 308 lb (139.7 kg)      BP Readings from Last 3 Encounters:   11/17/22 (!) 140/70   10/03/22 130/81      Current Outpatient Medications   Medication Sig    Eliquis 5 mg tablet     oxyCODONE IR (ROXICODONE) 5 mg immediate release tablet TAKE 1 TABLET BY MOUTH EVERY 4 HOURS AS NEEDED FOR SEVERE PAIN    Lantus Solostar U-100 Insulin 100 unit/mL (3 mL) inpn     Auryxia 210 mg iron tablet     simethicone (MYLICON) 80 mg chewable tablet Take 80 mg by mouth every six (6) hours as needed for Flatulence. gabapentin (NEURONTIN) 300 mg capsule Take 1 Capsule by mouth DIALYSIS MON, WED & FRI. Max Daily Amount: 300 mg.    tamsulosin (FLOMAX) 0.4 mg capsule Take 2 Capsules by mouth daily. senna-docusate (PERICOLACE) 8.6-50 mg per tablet Take 2 Tablets by mouth daily. polyethylene glycol (MIRALAX) 17 gram packet Take 1 Packet by mouth daily. ondansetron (ZOFRAN ODT) 4 mg disintegrating tablet Take 1 Tablet by mouth every eight (8) hours as needed for Nausea or Vomiting.    metoprolol tartrate (LOPRESSOR) 25 mg tablet Take 1 Tablet by mouth every twelve (12) hours. losartan (COZAAR) 50 mg tablet Take 1 Tablet by mouth daily. epoetin rico-epbx (RETACRIT) 20,000 unit/2 mL injection 2 mL by SubCUTAneous route every Monday, Wednesday, Friday. Indications: anemia due to kidney failure    bisacodyL (DULCOLAX) 10 mg supp Insert 10 mg into rectum daily as needed for Constipation. acetaminophen (TYLENOL) 325 mg tablet Take 2 Tablets by mouth every four (4) hours as needed for Pain or Fever. rosuvastatin (CRESTOR) 10 mg tablet Take 1 Tablet by mouth nightly. insulin aspart U-100 (NOVOLOG) 100 unit/mL (3 mL) inpn 5 Units by SubCUTAneous route Before breakfast, lunch, and dinner. warfarin (COUMADIN) 5 mg tablet Take 1 Tablet by mouth daily. If crushing required, wear gloves and use a pill  with self contained pouches. Pregnant staff should wear gloves when handling. Daily INR for goal INR of 2-3 for AFIB. (Patient not taking: Reported on 11/17/2022)    hydrOXYzine HCL (ATARAX) 25 mg tablet Take  mg by mouth every eight (8) hours as needed for Anxiety or Sleep.  (Patient not taking: Reported on 11/17/2022)    insulin CONCENTRATED regular (HumuLIN R U-500) 500 unit/mL soln 175 Units by SubCUTAneous route Every morning. (Patient not taking: Reported on 11/17/2022)    insulin CONCENTRATED regular (HumuLIN R U-500) 500 unit/mL soln 80 Units by SubCUTAneous route every evening. (Patient not taking: Reported on 11/17/2022)    levothyroxine (SYNTHROID) 25 mcg tablet Take 25 mcg by mouth Daily (before breakfast). (Patient not taking: Reported on 11/17/2022)     No current facility-administered medications for this visit. Impression see above.

## 2022-11-17 NOTE — PATIENT INSTRUCTIONS
Please start Amiodarone 200mg twice a day for 14 days. After 14 days go to 200mg daily. We will see you back in 2 months with an echo, office visit and labs.

## 2022-12-20 PROBLEM — I48.19 PERSISTENT ATRIAL FIBRILLATION (HCC): Status: ACTIVE | Noted: 2022-12-20

## 2022-12-20 PROBLEM — N18.6 ESRD (END STAGE RENAL DISEASE) ON DIALYSIS (HCC): Status: ACTIVE | Noted: 2022-12-20

## 2022-12-20 PROBLEM — Z99.2 ESRD (END STAGE RENAL DISEASE) ON DIALYSIS (HCC): Status: ACTIVE | Noted: 2022-12-20

## 2022-12-20 PROBLEM — I45.2 BIFASCICULAR BLOCK: Status: ACTIVE | Noted: 2022-12-20

## 2022-12-20 PROBLEM — I10 HYPERTENSION, ESSENTIAL: Status: ACTIVE | Noted: 2022-12-20

## 2022-12-20 PROBLEM — E11.9 TYPE 2 DIABETES MELLITUS WITHOUT COMPLICATION (HCC): Status: ACTIVE | Noted: 2022-12-20

## 2022-12-20 PROBLEM — D63.8 ANEMIA, CHRONIC DISEASE: Status: ACTIVE | Noted: 2022-12-20

## 2023-01-05 ENCOUNTER — OFFICE VISIT (OUTPATIENT)
Dept: ORTHOPEDIC SURGERY | Age: 63
End: 2023-01-05
Payer: MEDICARE

## 2023-01-05 VITALS — WEIGHT: 308 LBS | BODY MASS INDEX: 40.82 KG/M2 | HEIGHT: 73 IN

## 2023-01-05 DIAGNOSIS — E11.21 TYPE 2 DIABETES MELLITUS WITH DIABETIC NEPHROPATHY, WITH LONG-TERM CURRENT USE OF INSULIN (HCC): ICD-10-CM

## 2023-01-05 DIAGNOSIS — Z98.890 HISTORY OF ANKLE SURGERY: ICD-10-CM

## 2023-01-05 DIAGNOSIS — S82.852G: Primary | ICD-10-CM

## 2023-01-05 DIAGNOSIS — Z79.4 TYPE 2 DIABETES MELLITUS WITH DIABETIC NEPHROPATHY, WITH LONG-TERM CURRENT USE OF INSULIN (HCC): ICD-10-CM

## 2023-01-05 DIAGNOSIS — M25.472 PAIN AND SWELLING OF LEFT ANKLE: ICD-10-CM

## 2023-01-05 DIAGNOSIS — M25.572 PAIN AND SWELLING OF LEFT ANKLE: ICD-10-CM

## 2023-01-05 NOTE — LETTER
1/6/2023    Patient: Jerrilyn Klinefelter. YOB: 1960   Date of Visit: 1/5/2023     Vanna Alvarado MD  [address] [address]  Via Fax: [phone]    Dear Vanna Alvarado MD,      Thank you for referring Mr. Esteban Colmenares to [facility] for evaluation. My notes for this consultation are attached. If you have questions, please do not hesitate to call me. I look forward to following your patient along with you.       Sincerely,    Edel Stephenson MD

## 2023-01-06 RX ORDER — OXYCODONE HYDROCHLORIDE 5 MG/1
5 TABLET ORAL
Qty: 30 TABLET | Refills: 0 | Status: SHIPPED | OUTPATIENT
Start: 2023-01-06 | End: 2023-01-09

## 2023-02-03 ENCOUNTER — OFFICE VISIT (OUTPATIENT)
Dept: ORTHOPEDIC SURGERY | Age: 63
End: 2023-02-03
Payer: MEDICARE

## 2023-02-03 VITALS — HEIGHT: 73 IN | BODY MASS INDEX: 40.82 KG/M2 | WEIGHT: 308 LBS

## 2023-02-03 DIAGNOSIS — M25.572 PAIN AND SWELLING OF LEFT ANKLE: ICD-10-CM

## 2023-02-03 DIAGNOSIS — F17.210 CIGARETTE SMOKER: ICD-10-CM

## 2023-02-03 DIAGNOSIS — N18.6 DIABETES MELLITUS WITH END STAGE RENAL DISEASE (HCC): ICD-10-CM

## 2023-02-03 DIAGNOSIS — S82.852G: Primary | ICD-10-CM

## 2023-02-03 DIAGNOSIS — E11.22 DIABETES MELLITUS WITH END STAGE RENAL DISEASE (HCC): ICD-10-CM

## 2023-02-03 DIAGNOSIS — M14.672 CHARCOT ANKLE, LEFT: ICD-10-CM

## 2023-02-03 DIAGNOSIS — M25.472 PAIN AND SWELLING OF LEFT ANKLE: ICD-10-CM

## 2023-02-03 RX ORDER — CEPHALEXIN 500 MG/1
500 CAPSULE ORAL 2 TIMES DAILY
COMMUNITY
Start: 2023-01-18

## 2023-02-03 RX ORDER — OXYCODONE HYDROCHLORIDE 5 MG/1
TABLET ORAL
COMMUNITY
Start: 2023-01-18

## 2023-02-03 RX ORDER — FUROSEMIDE 80 MG/1
TABLET ORAL
COMMUNITY
Start: 2023-01-06

## 2023-02-03 RX ORDER — AMLODIPINE BESYLATE 10 MG/1
10 TABLET ORAL DAILY
COMMUNITY
Start: 2023-01-18

## 2023-02-03 NOTE — LETTER
2/10/2023    Patient: Marky Silver. YOB: 1960   Date of Visit: 2/3/2023     Keith Tena MD  [address] [address]  Via Fax: [phone]    Dear Keith Tena MD,      Thank you for referring Mr. Mendoza Noland to [facility] for evaluation. My notes for this consultation are attached. If you have questions, please do not hesitate to call me. I look forward to following your patient along with you.       Sincerely,    Criss Gonzalez MD

## 2023-02-03 NOTE — PROGRESS NOTES
Neville Duran. (: 1960) is a 58 y.o. male, patient,here for evaluation of the following   Chief Complaint   Patient presents with    Follow-up        ASSESSMENT/PLAN:  Below is the assessment and plan developed based on review of pertinent history, physical exam, labs, studies, and medications. 1. Displaced trimalleolar fracture of left lower leg, subsequent encounter for closed fracture with delayed healing  -     XR ANKLE LT MIN 3 V; Future  -     CBC WITH AUTOMATED DIFF; Future  -     CRP, HIGH SENSITIVITY; Future  -     SED RATE (ESR); Future  -     MS NON-PNEUM WALK BOOT PRE OTS  -     REFERRAL TO DME  2. Pain and swelling of left ankle  -     XR ANKLE LT MIN 3 V; Future  -     CBC WITH AUTOMATED DIFF; Future  -     CRP, HIGH SENSITIVITY; Future  -     SED RATE (ESR); Future  -     MS NON-PNEUM WALK BOOT PRE OTS  -     REFERRAL TO DME  3. Charcot ankle, left  4. Diabetes mellitus with end stage renal disease ([facility] [address]  5. Cigarette smoker    Patient verbalized understanding of exam findings and treatment plan. We engaged in the shared decision-making process and treatment options were discussed at length with the patient. Surgical and conservative management discussed today along with risk and benefits. Patient is well-informed of complications that can happen due to this fracture he sustained an despite having the surgery on 2022, he continues to have problems so it looks like early signs of Charcot arthropathy. I recommend again no weightbearing and patient should stop smoking. If he continues to smoke he is denies no chance of healing this at all. Unlikely this is going to heal so I have to consider the possibility of moving forward with a TTC arthrodesis procedure.   Fortunately I do not have to remove the plate or screws medially and proceed only with the syndesmosis screw removal.  I discussed this with the patient today, I went ahead and ordered some labs again to reconfirm there is no acute infections right now. He is in a boot and again encouraged to continue using the boot without weightbearing. He is fitted with a new boot today because the one he has is falling apart. I again also discussed smoking cessation otherwise no chance of healing. He is at risk for limb loss due to infection, healing complications. I discussed management options with the patient. All questions were answered to the best of my ability and to the patient's satisfaction. I discussed their history, symptoms, physical exam findings, diagnostic testing results, diagnosis and treatment options. The patient verbalized understanding and elected to proceed with conservative treatment as above, continue nonweightbearing, obtain labs to reconfirm no deep infections or infectious process ongoing, if any change in position after nonweightbearing in a boot, then surgery is indicated. He has significant health issues so he is at risk for complications. At length discussion had with this patient and his spouse present during this evaluation. When he returns next we will get new x-rays again, left ankle 3 views nonweightbearing. Return in about 13 days (around 2/16/2023) for repeat xrays. Allergies   Allergen Reactions    Adhesive Rash     Paper tape ok    Bactrim [Sulfamethoprim] Rash       Current Outpatient Medications   Medication Sig    cephALEXin (KEFLEX) 500 mg capsule Take 500 mg by mouth two (2) times a day. amLODIPine (NORVASC) 10 mg tablet Take 10 mg by mouth daily. oxyCODONE IR (ROXICODONE) 5 mg immediate release tablet TAKE 1 TABLET BY MOUTH TWICE DAILY AS NEEDED FOR SEVERE PAIN    SENNA TAKE 2 TABLET BY MOUTH DAILY AS NEEDED    furosemide (LASIX) 80 mg tablet TAKE 1 TABLET BY MOUTH EVERY NON-DIALYSIS DAY.     Eliquis 5 mg tablet     Lantus Solostar U-100 Insulin 100 unit/mL (3 mL) inpn     Auryxia 210 mg iron tablet     simethicone (MYLICON) 80 mg chewable tablet Take 80 mg by mouth every six (6) hours as needed for Flatulence. amiodarone (CORDARONE) 200 mg tablet Take one tablet by mouth twice a day for 14 days. After 14 days take one tablet by mouth daily. gabapentin (NEURONTIN) 300 mg capsule Take 1 Capsule by mouth DIALYSIS MON, WED & FRI. Max Daily Amount: 300 mg.    tamsulosin (FLOMAX) 0.4 mg capsule Take 2 Capsules by mouth daily. senna-docusate (PERICOLACE) 8.6-50 mg per tablet Take 2 Tablets by mouth daily. polyethylene glycol (MIRALAX) 17 gram packet Take 1 Packet by mouth daily. ondansetron (ZOFRAN ODT) 4 mg disintegrating tablet Take 1 Tablet by mouth every eight (8) hours as needed for Nausea or Vomiting.    metoprolol tartrate (LOPRESSOR) 25 mg tablet Take 1 Tablet by mouth every twelve (12) hours. losartan (COZAAR) 50 mg tablet Take 1 Tablet by mouth daily. epoetin rico-epbx (RETACRIT) 20,000 unit/2 mL injection 2 mL by SubCUTAneous route every Monday, Wednesday, Friday. Indications: anemia due to kidney failure    bisacodyL (DULCOLAX) 10 mg supp Insert 10 mg into rectum daily as needed for Constipation. rosuvastatin (CRESTOR) 10 mg tablet Take 1 Tablet by mouth nightly. insulin aspart U-100 (NOVOLOG) 100 unit/mL (3 mL) inpn 5 Units by SubCUTAneous route Before breakfast, lunch, and dinner. acetaminophen (TYLENOL) 325 mg tablet Take 2 Tablets by mouth every four (4) hours as needed for Pain or Fever. (Patient not taking: Reported on 2/3/2023)     No current facility-administered medications for this visit. Past Medical History:   Diagnosis Date    Anemia     Bifascicular block 12/20/2022    Chronic kidney disease     Diabetes ([address]     Dorsalgia     ESRD on hemodialysis ([address]     Gastro-esophageal reflux     Heart failure ([address]     Hypertension     Hypertension, essential 12/20/2022    Obesity     Persistent atrial fibrillation ([address] 12/20/2022    Polyneuropathy     Sciatica        No past surgical history on file.     No family history on file. Social History     Socioeconomic History    Marital status: SINGLE     Spouse name: Not on file    Number of children: Not on file    Years of education: Not on file    Highest education level: Not on file   Occupational History    Not on file   Tobacco Use    Smoking status: Every Day     Packs/day: 0.50     Types: Cigarettes    Smokeless tobacco: Never   Substance and Sexual Activity    Alcohol use: Not on file    Drug use: Not on file    Sexual activity: Not on file   Other Topics Concern    Not on file   Social History Narrative    Not on file     Social Determinants of Health     Financial Resource Strain: Not on file   Food Insecurity: Not on file   Transportation Needs: Not on file   Physical Activity: Not on file   Stress: Not on file   Social Connections: Not on file   Intimate Partner Violence: Not on file   Housing Stability: Not on file           Vitals:  Ht 6' 1\" (1.854 m)   Wt 308 lb (139.7 kg)   BMI 40.64 kg/m²    Body mass index is 40.64 kg/m². SUBJECTIVE:  Denisse Fuelling. (: 1960)   Patient returns today, he underwent surgical treatment at the left lower extremity on 2022 at [facility] where he was admitted for complications related to diabetes and renal failure. He also had fallen and sustained a trimalleolar ankle fracture. I was consulted for treatment of this patient. He underwent surgical treatment and did well postoperatively. He was then transferred to a nursing home facility after he recovered medically and has since seen me at least twice. He was then to return to see me at about 2 weeks after the last visit, he did not return at that same week instead presented at 1 [facility] emergency room and I was contacted by the PA on-call for orthopedics, the patient had some redness and swelling. There was concern for infection but his labs were mostly normal without increased white count, he did not have fevers or chills.   He was evaluated and released and told to remain nonweightbearing. He had been told to remain nonweightbearing after having been seen approximately 1 to 2 weeks prior to that presentation at [facility]. Today he comes in he is weightbearing and smells of cigarette smoke. He is diabetic with chronic renal disease requiring dialysis. OBJECTIVE EXAM:     Visit Vitals  Ht 6' 1\" (1.854 m)   Wt 308 lb (139.7 kg)   BMI 40.64 kg/m²       Appearance: Alert, well appearing and pleasant patient who is in no distress, oriented to person, place/time, and who follows commands. This patient is accompanied in the       office by his  self and spouse. Psychiatric: Affect and mood are appropriate. No dementia noted on examination  Musculoskeletal:  LOCATION: Diffuse tenderness and swelling ankle - left, foot - left, and leg - left      Integumentary: No rashes, skin patches, wounds, or abrasions to the right or left legs       Warm and normal color. No regions of expressible drainage. Gait: Normal      Tenderness: No tenderness        Motor/Strength/Tone Exam: Normal       Sensory Exam:   Intact Normal Sensation to ankle/foot      Stability Testing: No anterolateral or varus instability of the Ankle or Subtalar Joints               No peroneal tendon instability noted      ROM: Normal ROM noted to ankle/foot      Contractures: No Achilles or Gastrocnemius Contractures      Calf tenderness: Absent for calf or gastrocnemius muscle regions       Soft, supple, non tender, non taut lower extremity compartment  Alignment:      NEUTRAL Hindfoot,         none Metatarsus Adductus Metatarsus   Wounds/Abrasions:    None present  Extremities:   No embolic phenomena to the toes          No significant edema to the foot and or toes.         Lower extremities are warm and appear well perfused    DVT: No evidence of DVT seen on examination at this time     No calf swelling, no tenderness to calf muscles  Lymphatic:  No Evidence of Lymphedema  Vascular: Medial Border of Tibia Region: Edema is present         Pulses: Dorsalis Pedis &  Posterior Tibial Pulses : Palpable yes        Varicosities Lower Limbs :  None  noted  Neuro: Negative bilateral Straight leg raise (seated position)    See Musculoskeletal section for pertinent individual extremity examination    No abnormal hand/wrist, foot/ankle, or facial/neck tremors. Lower Extremity/Ankle/Foot:  Antalgic gait, satisfactory weightbearing stance. Left lower extremity/ankle: There is diffuse tenderness and swelling throughout the entire leg with redness indicating possibility Charcot, has not increased warmth and does not appear to be infected, the incision sites lateral and medial ankle are well-healed without infection, overall alignment of the ankle remains satisfactory neutral, gentle range of motion intact, stiffness at the ankle joint noted. Achilles tendon intact with negative Giles test.  Tenderness at the lateral and medial malleolus. Left foot: Diffuse swelling but no increased warmth, no severe malalignment or deformity, no incisions at the foot, there is decreased sensation to light touch and Narendra Francisco Javier testing 5.07 shows neuropathy salt like distribution. Contralateral lower extremity/ankle /foot exam:  Nontender, no swelling ligaments grossly stable. Normal weightbearing stance.     Neurovascular exam is intact for capillary refill of 3 seconds, dorsalis pedis pulses not easily palpable, EHL/FHL 5/5, [place] Paige filament 5.07    Imaging:  XR Results (most recent):  Results from Appointment encounter on 02/03/23    XR ANKLE LT MIN 3 V    Narrative  Left ankle AP, lateral and oblique x-rays show there is loosening of implants with some screws backing out these her syndesmosis screws, the overall alignment satisfactory although there is slight varus position now developing at the talus, there is soft tissue swelling without radiolucency, no evidence of abscess or other deeper infections, there is decreased bone density. An electronic signature was used to authenticate this note.   -- Maura Fowler MD

## 2024-04-24 NOTE — PROGRESS NOTES
Lavell Panchal. (: 1960) is a 58 y.o. male, patient,here for evaluation of the following   Chief Complaint   Patient presents with    Ankle Pain     left        ASSESSMENT/PLAN:  Below is the assessment and plan developed based on review of pertinent history, physical exam, labs, studies, and medications. 1. Displaced trimalleolar fracture of left lower leg, subsequent encounter for closed fracture with delayed healing  -     oxyCODONE IR (Roxicodone) 5 mg immediate release tablet; Take 1 Tablet by mouth every six (6) hours as needed for Pain for up to 3 days. Max Daily Amount: 20 mg., Normal, Disp-30 Tablet, R-0  2. Pain and swelling of left ankle  -     XR ANKLE LT MIN 3 V; Future  -     CBC WITH AUTOMATED DIFF; Future  -     SED RATE (ESR)  -     CRP, HIGH SENSITIVITY; Future  -     METABOLIC PANEL, BASIC; Future  -     HEMOGLOBIN A1C WITH EAG; Future  -     REFERRAL TO PAIN MANAGEMENT  -     oxyCODONE IR (Roxicodone) 5 mg immediate release tablet; Take 1 Tablet by mouth every six (6) hours as needed for Pain for up to 3 days. Max Daily Amount: 20 mg., Normal, Disp-30 Tablet, R-0  3. History of ankle surgery  -     oxyCODONE IR (Roxicodone) 5 mg immediate release tablet; Take 1 Tablet by mouth every six (6) hours as needed for Pain for up to 3 days. Max Daily Amount: 20 mg., Normal, Disp-30 Tablet, R-0  4. Type 2 diabetes mellitus with diabetic nephropathy, with long-term current use of insulin [facility])    Patient verbalized understanding of exam findings and treatment plan. We engaged in the shared decision-making process and treatment options were discussed at length with the patient. Surgical and conservative management discussed today along with risk and benefits. Patient is informed of findings on exam and x-rays reviewed, he could possibly be in the early stages of Charcot arthropathy so I did recommend he keep off his ankle be nonweightbearing and continue use of a boot.   He does have a bit of erythema so I went ahead and obtain a CBC with differential, sed rate/ESR, CRP, BMP, HG A1c. I also provided 1 prescription of oxycodone and referred to a pain specialist for chronic pain management. Patient will return in approximately 2 to 4 weeks, I will review the labs and contact him if there is any significant findings of possible infection. In my opinion this looks like early signs of Charcot arthropathy as some of the screws are backing out although the whole construct so is well aligned. Patient is informed of these findings. I discussed management options with the patient. All questions were answered to the best of my ability and to the patient's satisfaction. I discussed their history, symptoms, physical exam findings, diagnostic testing results, diagnosis and treatment options. Return in about 3 weeks (around 1/26/2023) for repeat xrays. Allergies   Allergen Reactions    Adhesive Rash     Paper tape ok    Bactrim [Sulfamethoprim] Rash       Current Outpatient Medications   Medication Sig    oxyCODONE IR (Roxicodone) 5 mg immediate release tablet Take 1 Tablet by mouth every six (6) hours as needed for Pain for up to 3 days. Max Daily Amount: 20 mg.    Eliquis 5 mg tablet     Lantus Solostar U-100 Insulin 100 unit/mL (3 mL) inpn     Auryxia 210 mg iron tablet     simethicone (MYLICON) 80 mg chewable tablet Take 80 mg by mouth every six (6) hours as needed for Flatulence. amiodarone (CORDARONE) 200 mg tablet Take one tablet by mouth twice a day for 14 days. After 14 days take one tablet by mouth daily. gabapentin (NEURONTIN) 300 mg capsule Take 1 Capsule by mouth DIALYSIS MON, WED & FRI. Max Daily Amount: 300 mg.    tamsulosin (FLOMAX) 0.4 mg capsule Take 2 Capsules by mouth daily. senna-docusate (PERICOLACE) 8.6-50 mg per tablet Take 2 Tablets by mouth daily. polyethylene glycol (MIRALAX) 17 gram packet Take 1 Packet by mouth daily.     ondansetron (ZOFRAN ODT) 4 mg disintegrating tablet Take 1 Tablet by mouth every eight (8) hours as needed for Nausea or Vomiting.    metoprolol tartrate (LOPRESSOR) 25 mg tablet Take 1 Tablet by mouth every twelve (12) hours. losartan (COZAAR) 50 mg tablet Take 1 Tablet by mouth daily. epoetin rico-epbx (RETACRIT) 20,000 unit/2 mL injection 2 mL by SubCUTAneous route every Monday, Wednesday, Friday. Indications: anemia due to kidney failure    bisacodyL (DULCOLAX) 10 mg supp Insert 10 mg into rectum daily as needed for Constipation. acetaminophen (TYLENOL) 325 mg tablet Take 2 Tablets by mouth every four (4) hours as needed for Pain or Fever. rosuvastatin (CRESTOR) 10 mg tablet Take 1 Tablet by mouth nightly. insulin aspart U-100 (NOVOLOG) 100 unit/mL (3 mL) inpn 5 Units by SubCUTAneous route Before breakfast, lunch, and dinner. No current facility-administered medications for this visit. Past Medical History:   Diagnosis Date    Anemia     Bifascicular block 12/20/2022    Chronic kidney disease     Diabetes ([facility] [address]     Dorsalgia     ESRD on hemodialysis ([facility] [address]     Gastro-esophageal reflux     Heart failure ([facility] [address]     Hypertension     Hypertension, essential 12/20/2022    Obesity     Persistent atrial fibrillation ([address] 12/20/2022    Polyneuropathy     Sciatica        No past surgical history on file. No family history on file.     Social History     Socioeconomic History    Marital status: SINGLE     Spouse name: Not on file    Number of children: Not on file    Years of education: Not on file    Highest education level: Not on file   Occupational History    Not on file   Tobacco Use    Smoking status: Every Day     Packs/day: 0.50     Types: Cigarettes    Smokeless tobacco: Never   Substance and Sexual Activity    Alcohol use: Not on file    Drug use: Not on file    Sexual activity: Not on file   Other Topics Concern    Not on file   Social History Narrative    Not on file     Social Determinants of Health     Financial Resource Strain: Not on file   Food Insecurity: Not on file   Transportation Needs: Not on file   Physical Activity: Not on file   Stress: Not on file   Social Connections: Not on file   Intimate Partner Violence: Not on file   Housing Stability: Not on file           Vitals:  Ht 6' 1\" (1.854 m)   Wt 308 lb (139.7 kg)   BMI 40.64 kg/m²    Body mass index is 40.64 kg/m². SUBJECTIVE:  Nadira Mejia. (: 1960)   Patient is back today for reevaluation of the left lower extremity where he had a trimalleolar ankle fracture in 2022 and underwent surgical treatment. He has significant other medical problems to include diabetes mellitus with neuropathy and renal failure requiring dialysis. His overall alignment has remained okay since last seen but more recently he has had a little bit more pain and swelling. He has not really started walking on this as I had informed the patient that this may take a very long time to heal and is currently at 3 months since date of surgery. He denies any fevers or chills. He continues to wear his boot. OBJECTIVE EXAM:     Visit Vitals  Ht 6' 1\" (1.854 m)   Wt 308 lb (139.7 kg)   BMI 40.64 kg/m²       Appearance: Alert, well appearing and pleasant patient who is in no distress, oriented to person, place/time, and who follows commands. This patient is accompanied in the       office by his  self. Psychiatric: Affect and mood are appropriate. No dementia noted on examination  Musculoskeletal:  LOCATION: Diffuse tenderness and swelling ankle - left      Integumentary: No rashes, skin patches, wounds, or abrasions to the right or left legs       Warm and normal color. No regions of expressible drainage.       Gait: Normal      Tenderness: No tenderness        Motor/Strength/Tone Exam: Normal       Sensory Exam:   Intact Normal Sensation to ankle/foot      Stability Testing: No anterolateral or varus instability of the Ankle or Subtalar Joints No peroneal tendon instability noted      ROM: Normal ROM noted to ankle/foot      Contractures: No Achilles or Gastrocnemius Contractures      Calf tenderness: Absent for calf or gastrocnemius muscle regions       Soft, supple, non tender, non taut lower extremity compartment  Alignment:      NEUTRAL Hindfoot,         none Metatarsus Adductus Metatarsus   Wounds/Abrasions:    None present  Extremities:   No embolic phenomena to the toes          No significant edema to the foot and or toes. Lower extremities are warm and appear well perfused    DVT: No evidence of DVT seen on examination at this time     No calf swelling, no tenderness to calf muscles  Lymphatic:  No Evidence of Lymphedema  Vascular: Medial Border of Tibia Region: Edema is not present         Pulses: Dorsalis Pedis &  Posterior Tibial Pulses : Palpable yes        Varicosities Lower Limbs :  None  noted  Neuro: Negative bilateral Straight leg raise (seated position)    See Musculoskeletal section for pertinent individual extremity examination    No abnormal hand/wrist, foot/ankle, or facial/neck tremors. Lower Extremity/Ankle/Foot:  Mostly nonweightbearing gait, limited weightbearing stance. Left lower extremity/ankle: There is no malalignment or deformity, circumferential tenderness around the fibula and medial malleolus, there is swelling circumferentially around the ankle, mild erythema, no fluctuance, no drainage from incision sites which are all healed. There is satisfactory warmth without severe warmth. Left foot: There is some swelling but nontender, no open wounds or lesions, able to flex and extend toes suspect range of motion and strength. Contralateral lower extremity/ankle /foot exam:  Nontender, no swelling ligaments grossly stable. Normal weightbearing stance.     Neurovascular exam is intact for light touch sensation but only at the proximal leg and less at the ankle, there is slow capillary refill to the 16 toes, dorsalis pedis pulses not palpable. Able to flex and extend the toes 5/5 strength    Imaging:    XR Results (most recent):  Results from Appointment encounter on 01/05/23    XR ANKLE LT MIN 3 V    Narrative  Left ankle AP, lateral and oblique nonweightbearing x-rays show there is loosening of the implants noted, overall alignment remains satisfactory for position of the ankle joint and mortise, the medial malleolus fracture is definitely still visible not heal.  Soft tissue swelling still present. An electronic signature was used to authenticate this note.   -- Wesley Ochoa MD

## (undated) DEVICE — PADDING CAST W4INXL4YD SPUN DACRON POLY POR SYN VERSATILE

## (undated) DEVICE — SPONGE,LAP,18"X18",XR,ST,5/TRAY: Brand: MEDLINE

## (undated) DEVICE — SUTURE VCRL SZ 3-0 L27IN ABSRB VLT L26MM SH 1/2 CIR J316H

## (undated) DEVICE — SYR 10ML LUER LOK 1/5ML GRAD --

## (undated) DEVICE — SUTURE VCRL 2-0 L27IN ABSRB UD CP-2 L26MM 1/2 CIR REV CUT J869H

## (undated) DEVICE — BIT DRL DIA2.7MM CALIB

## (undated) DEVICE — ROCKER SWITCH PENCIL BLADE ELECTRODE, HOLSTER: Brand: EDGE

## (undated) DEVICE — GOWN,SIRUS,NONRNF,SETINSLV,2XL,18/CS: Brand: MEDLINE

## (undated) DEVICE — SPONGE GZ W4XL4IN COT RADPQ HIGHLY ABSRB

## (undated) DEVICE — SUTURE PROL 5 0 L18IN NONABSORBABLE BLU RB 1 L17MM 1 2 CIR 8756H

## (undated) DEVICE — ZIMMER® STERILE DISPOSABLE TOURNIQUET CUFF WITH PLC, DUAL PORT, SINGLE BLADDER, 34 IN. (86 CM)

## (undated) DEVICE — BANDAGE COMPR M W6INXL10YD WHT BGE VELC E MTRX HK AND LOOP

## (undated) DEVICE — HANDLE LT SNAP ON ULT DURABLE LENS FOR TRUMPF ALC DISPOSABLE

## (undated) DEVICE — IMPLANTABLE DEVICE
Type: IMPLANTABLE DEVICE | Site: ANKLE | Status: NON-FUNCTIONAL
Removed: 2022-09-20

## (undated) DEVICE — PENCIL SMK EVAC 10 FT BLADE ELECTRD ROCKER FOR TELSCP

## (undated) DEVICE — PADDING CST 6IN STERILE --

## (undated) DEVICE — Device

## (undated) DEVICE — SPLINT THMB W5XL30IN FBRGLS PD PRECUT LTWT DURABLE FAST SET

## (undated) DEVICE — GARMENT,MEDLINE,DVT,INT,CALF,MED, GEN2: Brand: MEDLINE

## (undated) DEVICE — GLOVE SURG SZ 65 THK91MIL LTX FREE SYN POLYISOPRENE

## (undated) DEVICE — SUTURE MCRYL SZ 3-0 L27IN ABSRB UD L19MM PS-2 3/8 CIR PRIM Y427H

## (undated) DEVICE — BANDAGE COBAN 4 IN COMPR W4INXL5YD FOAM COHESIVE QUIK STK SELF ADH SFT

## (undated) DEVICE — DRESSING,GAUZE,XEROFORM,CURAD,5"X9",ST: Brand: CURAD

## (undated) DEVICE — SOL INJ SOD CL 0.9% 500ML BG --

## (undated) DEVICE — GLOVE ORANGE PI 7   MSG9070

## (undated) DEVICE — PAD,ABDOMINAL,5"X9",ST,LF,25/BX: Brand: MEDLINE INDUSTRIES, INC.

## (undated) DEVICE — SUTURE VCRL SZ 0 L27IN ABSRB UD L36MM CT-1 1/2 CIR J260H

## (undated) DEVICE — SOLUTION IRRIG 1000ML STRL H2O USP PLAS POUR BTL

## (undated) DEVICE — GLOVE SURG SZ 7 L12IN FNGR THK79MIL GRN LTX FREE

## (undated) DEVICE — BANDAGE,ELASTIC,ESMARK,STERILE,4"X9',LF: Brand: MEDLINE

## (undated) DEVICE — SUTURE VCRL SZ 0 L27IN ABSRB UD L26MM CT-2 1/2 CIR J270H

## (undated) DEVICE — SPONGE GZ W4XL4IN COT 12 PLY TYP VII WVN C FLD DSGN

## (undated) DEVICE — SUTURE MCRYL 3-0 L27IN ABSRB VLT SH L26MM 1/2 CIR Y316H

## (undated) DEVICE — SOLUTION IRRIG 1000ML 0.9% SOD CHL USP POUR PLAS BTL

## (undated) DEVICE — EXTREMITY - SMH: Brand: MEDLINE INDUSTRIES, INC.

## (undated) DEVICE — SUTURE MCRYL SZ 5-0 L18IN ABSRB UD L19MM PS-2 3/8 CIR PRIM Y495G

## (undated) DEVICE — DRAPE C-ARMOUR C-ARM KIT --

## (undated) DEVICE — SUT ETHLN 3-0 18IN PS1 BLK --

## (undated) DEVICE — GLOVE RAD ATTENUATING PWD FREE BLK SZ 8 ESP

## (undated) DEVICE — INTENDED FOR TISSUE SEPARATION, AND OTHER PROCEDURES THAT REQUIRE A SHARP SURGICAL BLADE TO PUNCTURE OR CUT.: Brand: BARD-PARKER ® CARBON RIB-BACK BLADES

## (undated) DEVICE — PREP SKN CHLRAPRP APL 26ML STR --